# Patient Record
Sex: MALE | Race: WHITE | NOT HISPANIC OR LATINO | Employment: FULL TIME | URBAN - METROPOLITAN AREA
[De-identification: names, ages, dates, MRNs, and addresses within clinical notes are randomized per-mention and may not be internally consistent; named-entity substitution may affect disease eponyms.]

---

## 2017-01-06 ENCOUNTER — APPOINTMENT (OUTPATIENT)
Dept: ONCOLOGY | Facility: HOSPITAL | Age: 47
End: 2017-01-06

## 2017-01-10 ENCOUNTER — APPOINTMENT (OUTPATIENT)
Dept: LAB | Facility: HOSPITAL | Age: 47
End: 2017-01-10

## 2017-01-10 ENCOUNTER — LAB (OUTPATIENT)
Dept: LAB | Facility: HOSPITAL | Age: 47
End: 2017-01-10
Attending: INTERNAL MEDICINE

## 2017-01-10 ENCOUNTER — APPOINTMENT (OUTPATIENT)
Dept: ONCOLOGY | Facility: HOSPITAL | Age: 47
End: 2017-01-10

## 2017-01-10 DIAGNOSIS — E83.110 HEREDITARY HEMOCHROMATOSIS (HCC): ICD-10-CM

## 2017-01-10 LAB
BASOPHILS # BLD AUTO: 0.01 10*3/MM3 (ref 0–0.2)
BASOPHILS NFR BLD AUTO: 0.2 % (ref 0–1.5)
DEPRECATED RDW RBC AUTO: 42.6 FL (ref 37–54)
EOSINOPHIL # BLD AUTO: 0.11 10*3/MM3 (ref 0–0.7)
EOSINOPHIL NFR BLD AUTO: 2.1 % (ref 0.3–6.2)
ERYTHROCYTE [DISTWIDTH] IN BLOOD BY AUTOMATED COUNT: 13.5 % (ref 11.5–14.5)
FERRITIN SERPL-MCNC: 104.3 NG/ML (ref 30–400)
HCT VFR BLD AUTO: 42 % (ref 40.4–52.2)
HGB BLD-MCNC: 14.8 G/DL (ref 13.7–17.6)
IMM GRANULOCYTES # BLD: 0.01 10*3/MM3 (ref 0–0.03)
IMM GRANULOCYTES NFR BLD: 0.2 % (ref 0–0.5)
LYMPHOCYTES # BLD AUTO: 1.65 10*3/MM3 (ref 0.9–4.8)
LYMPHOCYTES NFR BLD AUTO: 31.6 % (ref 19.6–45.3)
MCH RBC QN AUTO: 30.5 PG (ref 27–32.7)
MCHC RBC AUTO-ENTMCNC: 35.2 G/DL (ref 32.6–36.4)
MCV RBC AUTO: 86.6 FL (ref 79.8–96.2)
MONOCYTES # BLD AUTO: 0.34 10*3/MM3 (ref 0.2–1.2)
MONOCYTES NFR BLD AUTO: 6.5 % (ref 5–12)
NEUTROPHILS # BLD AUTO: 3.1 10*3/MM3 (ref 1.9–8.1)
NEUTROPHILS NFR BLD AUTO: 59.4 % (ref 42.7–76)
NRBC BLD MANUAL-RTO: 0 /100 WBC (ref 0–0)
PLATELET # BLD AUTO: 162 10*3/MM3 (ref 140–500)
PMV BLD AUTO: 9.1 FL (ref 6–12)
RBC # BLD AUTO: 4.85 10*6/MM3 (ref 4.6–6)
WBC NRBC COR # BLD: 5.22 10*3/MM3 (ref 4.5–10.7)

## 2017-01-10 PROCEDURE — 36415 COLL VENOUS BLD VENIPUNCTURE: CPT

## 2017-01-10 PROCEDURE — 82728 ASSAY OF FERRITIN: CPT | Performed by: INTERNAL MEDICINE

## 2017-01-10 PROCEDURE — 85025 COMPLETE CBC W/AUTO DIFF WBC: CPT

## 2017-01-11 ENCOUNTER — TELEPHONE (OUTPATIENT)
Dept: ONCOLOGY | Facility: HOSPITAL | Age: 47
End: 2017-01-11

## 2017-01-11 NOTE — TELEPHONE ENCOUNTER
----- Message from Olga Peacock sent at 1/11/2017 11:56 AM EST -----   Lab results      [phone]    Pt calling for ferritin results. Ferritin 104. Pt aware. Message sent to scheduling to call pt to schedule phlebo. Pt v/u

## 2017-01-12 ENCOUNTER — INFUSION (OUTPATIENT)
Dept: ONCOLOGY | Facility: HOSPITAL | Age: 47
End: 2017-01-12

## 2017-01-12 VITALS
HEART RATE: 93 BPM | DIASTOLIC BLOOD PRESSURE: 92 MMHG | BODY MASS INDEX: 32.69 KG/M2 | SYSTOLIC BLOOD PRESSURE: 154 MMHG | TEMPERATURE: 97.2 F | WEIGHT: 234.4 LBS

## 2017-01-12 DIAGNOSIS — E83.110 HEREDITARY HEMOCHROMATOSIS (HCC): Primary | ICD-10-CM

## 2017-01-12 PROCEDURE — 99195 PHLEBOTOMY: CPT

## 2017-03-03 ENCOUNTER — APPOINTMENT (OUTPATIENT)
Dept: ONCOLOGY | Facility: HOSPITAL | Age: 47
End: 2017-03-03

## 2017-03-07 ENCOUNTER — LAB (OUTPATIENT)
Dept: ONCOLOGY | Facility: HOSPITAL | Age: 47
End: 2017-03-07

## 2017-03-07 ENCOUNTER — INFUSION (OUTPATIENT)
Dept: ONCOLOGY | Facility: HOSPITAL | Age: 47
End: 2017-03-07

## 2017-03-07 VITALS
WEIGHT: 220 LBS | SYSTOLIC BLOOD PRESSURE: 128 MMHG | HEART RATE: 97 BPM | BODY MASS INDEX: 30.68 KG/M2 | DIASTOLIC BLOOD PRESSURE: 87 MMHG | TEMPERATURE: 97.6 F

## 2017-03-07 DIAGNOSIS — E83.110 HEREDITARY HEMOCHROMATOSIS (HCC): ICD-10-CM

## 2017-03-07 DIAGNOSIS — E83.110 HEREDITARY HEMOCHROMATOSIS (HCC): Primary | ICD-10-CM

## 2017-03-07 LAB
BASOPHILS # BLD AUTO: 0.02 10*3/MM3 (ref 0–0.2)
BASOPHILS NFR BLD AUTO: 0.4 % (ref 0–1.5)
DEPRECATED RDW RBC AUTO: 43 FL (ref 37–54)
EOSINOPHIL # BLD AUTO: 0.09 10*3/MM3 (ref 0–0.7)
EOSINOPHIL NFR BLD AUTO: 1.6 % (ref 0.3–6.2)
ERYTHROCYTE [DISTWIDTH] IN BLOOD BY AUTOMATED COUNT: 13.8 % (ref 11.5–14.5)
FERRITIN SERPL-MCNC: 84.8 NG/ML (ref 30–400)
HCT VFR BLD AUTO: 43.4 % (ref 40.4–52.2)
HGB BLD-MCNC: 15.3 G/DL (ref 13.7–17.6)
IMM GRANULOCYTES # BLD: 0.01 10*3/MM3 (ref 0–0.03)
IMM GRANULOCYTES NFR BLD: 0.2 % (ref 0–0.5)
LYMPHOCYTES # BLD AUTO: 1.67 10*3/MM3 (ref 0.9–4.8)
LYMPHOCYTES NFR BLD AUTO: 29.3 % (ref 19.6–45.3)
MCH RBC QN AUTO: 30.2 PG (ref 27–32.7)
MCHC RBC AUTO-ENTMCNC: 35.3 G/DL (ref 32.6–36.4)
MCV RBC AUTO: 85.6 FL (ref 79.8–96.2)
MONOCYTES # BLD AUTO: 0.38 10*3/MM3 (ref 0.2–1.2)
MONOCYTES NFR BLD AUTO: 6.7 % (ref 5–12)
NEUTROPHILS # BLD AUTO: 3.52 10*3/MM3 (ref 1.9–8.1)
NEUTROPHILS NFR BLD AUTO: 61.8 % (ref 42.7–76)
NRBC BLD MANUAL-RTO: 0 /100 WBC (ref 0–0)
PLATELET # BLD AUTO: 166 10*3/MM3 (ref 140–500)
PMV BLD AUTO: 9.6 FL (ref 6–12)
RBC # BLD AUTO: 5.07 10*6/MM3 (ref 4.6–6)
WBC NRBC COR # BLD: 5.69 10*3/MM3 (ref 4.5–10.7)

## 2017-03-07 PROCEDURE — 85025 COMPLETE CBC W/AUTO DIFF WBC: CPT | Performed by: INTERNAL MEDICINE

## 2017-03-07 PROCEDURE — 36415 COLL VENOUS BLD VENIPUNCTURE: CPT

## 2017-03-07 PROCEDURE — 82728 ASSAY OF FERRITIN: CPT | Performed by: INTERNAL MEDICINE

## 2017-03-07 PROCEDURE — 99195 PHLEBOTOMY: CPT | Performed by: INTERNAL MEDICINE

## 2017-04-27 ENCOUNTER — INFUSION (OUTPATIENT)
Dept: ONCOLOGY | Facility: HOSPITAL | Age: 47
End: 2017-04-27

## 2017-04-27 ENCOUNTER — LAB (OUTPATIENT)
Dept: OTHER | Facility: HOSPITAL | Age: 47
End: 2017-04-27

## 2017-04-27 VITALS
TEMPERATURE: 98.7 F | HEART RATE: 80 BPM | BODY MASS INDEX: 33.03 KG/M2 | WEIGHT: 236.8 LBS | SYSTOLIC BLOOD PRESSURE: 148 MMHG | DIASTOLIC BLOOD PRESSURE: 93 MMHG

## 2017-04-27 DIAGNOSIS — E83.110 HEREDITARY HEMOCHROMATOSIS (HCC): Primary | ICD-10-CM

## 2017-04-27 DIAGNOSIS — E83.110 HEREDITARY HEMOCHROMATOSIS (HCC): ICD-10-CM

## 2017-04-27 LAB
BASOPHILS # BLD AUTO: 0.02 10*3/MM3 (ref 0–0.2)
BASOPHILS NFR BLD AUTO: 0.4 % (ref 0–1.5)
DEPRECATED RDW RBC AUTO: 45 FL (ref 37–54)
EOSINOPHIL # BLD AUTO: 0.07 10*3/MM3 (ref 0–0.7)
EOSINOPHIL NFR BLD AUTO: 1.4 % (ref 0.3–6.2)
ERYTHROCYTE [DISTWIDTH] IN BLOOD BY AUTOMATED COUNT: 14.1 % (ref 11.5–14.5)
FERRITIN SERPL-MCNC: 52.7 NG/ML (ref 30–400)
HCT VFR BLD AUTO: 42.9 % (ref 40.4–52.2)
HGB BLD-MCNC: 14.8 G/DL (ref 13.7–17.6)
IMM GRANULOCYTES # BLD: 0.01 10*3/MM3 (ref 0–0.03)
IMM GRANULOCYTES NFR BLD: 0.2 % (ref 0–0.5)
LYMPHOCYTES # BLD AUTO: 1.82 10*3/MM3 (ref 0.9–4.8)
LYMPHOCYTES NFR BLD AUTO: 36 % (ref 19.6–45.3)
MCH RBC QN AUTO: 30.4 PG (ref 27–32.7)
MCHC RBC AUTO-ENTMCNC: 34.5 G/DL (ref 32.6–36.4)
MCV RBC AUTO: 88.1 FL (ref 79.8–96.2)
MONOCYTES # BLD AUTO: 0.37 10*3/MM3 (ref 0.2–1.2)
MONOCYTES NFR BLD AUTO: 7.3 % (ref 5–12)
NEUTROPHILS # BLD AUTO: 2.76 10*3/MM3 (ref 1.9–8.1)
NEUTROPHILS NFR BLD AUTO: 54.7 % (ref 42.7–76)
NRBC BLD MANUAL-RTO: 0 /100 WBC (ref 0–0)
PLATELET # BLD AUTO: 186 10*3/MM3 (ref 140–500)
PMV BLD AUTO: 9.8 FL (ref 6–12)
RBC # BLD AUTO: 4.87 10*6/MM3 (ref 4.6–6)
WBC NRBC COR # BLD: 5.05 10*3/MM3 (ref 4.5–10.7)

## 2017-04-27 PROCEDURE — 85025 COMPLETE CBC W/AUTO DIFF WBC: CPT | Performed by: INTERNAL MEDICINE

## 2017-04-27 PROCEDURE — 36415 COLL VENOUS BLD VENIPUNCTURE: CPT

## 2017-04-27 PROCEDURE — 82728 ASSAY OF FERRITIN: CPT | Performed by: INTERNAL MEDICINE

## 2017-04-27 PROCEDURE — 99195 PHLEBOTOMY: CPT | Performed by: NURSE PRACTITIONER

## 2017-04-28 ENCOUNTER — APPOINTMENT (OUTPATIENT)
Dept: ONCOLOGY | Facility: HOSPITAL | Age: 47
End: 2017-04-28

## 2017-05-31 DIAGNOSIS — E83.110 HEREDITARY HEMOCHROMATOSIS (HCC): ICD-10-CM

## 2017-05-31 DIAGNOSIS — E55.9 AVITAMINOSIS D: ICD-10-CM

## 2017-05-31 DIAGNOSIS — Z00.00 HEALTHCARE MAINTENANCE: Primary | ICD-10-CM

## 2017-05-31 DIAGNOSIS — R03.0 BORDERLINE BLOOD PRESSURE: ICD-10-CM

## 2017-06-03 LAB
25(OH)D3+25(OH)D2 SERPL-MCNC: 18.5 NG/ML (ref 30–100)
ALBUMIN SERPL-MCNC: 5 G/DL (ref 3.5–5.2)
ALBUMIN/GLOB SERPL: 1.9 G/DL
ALP SERPL-CCNC: 71 U/L (ref 39–117)
ALT SERPL-CCNC: 42 U/L (ref 1–41)
APPEARANCE UR: CLEAR
AST SERPL-CCNC: 31 U/L (ref 1–40)
BACTERIA #/AREA URNS HPF: NORMAL /HPF
BASOPHILS # BLD AUTO: 0.01 10*3/MM3 (ref 0–0.2)
BASOPHILS NFR BLD AUTO: 0.2 % (ref 0–1.5)
BILIRUB SERPL-MCNC: 0.9 MG/DL (ref 0.1–1.2)
BILIRUB UR QL STRIP: NEGATIVE
BUN SERPL-MCNC: 12 MG/DL (ref 6–20)
BUN/CREAT SERPL: 12.4 (ref 7–25)
CALCIUM SERPL-MCNC: 9.6 MG/DL (ref 8.6–10.5)
CHLORIDE SERPL-SCNC: 98 MMOL/L (ref 98–107)
CHOLEST SERPL-MCNC: 159 MG/DL (ref 0–200)
CO2 SERPL-SCNC: 26.5 MMOL/L (ref 22–29)
COLOR UR: YELLOW
CREAT SERPL-MCNC: 0.97 MG/DL (ref 0.76–1.27)
EOSINOPHIL # BLD AUTO: 0.13 10*3/MM3 (ref 0–0.7)
EOSINOPHIL NFR BLD AUTO: 2.2 % (ref 0.3–6.2)
EPI CELLS #/AREA URNS HPF: NORMAL /HPF
ERYTHROCYTE [DISTWIDTH] IN BLOOD BY AUTOMATED COUNT: 14.6 % (ref 11.5–14.5)
GLOBULIN SER CALC-MCNC: 2.7 GM/DL
GLUCOSE SERPL-MCNC: 115 MG/DL (ref 65–99)
GLUCOSE UR QL: NEGATIVE
HCT VFR BLD AUTO: 46.2 % (ref 40.4–52.2)
HDLC SERPL-MCNC: 39 MG/DL (ref 40–60)
HGB BLD-MCNC: 15.4 G/DL (ref 13.7–17.6)
HGB UR QL STRIP: NEGATIVE
IMM GRANULOCYTES # BLD: 0 10*3/MM3 (ref 0–0.03)
IMM GRANULOCYTES NFR BLD: 0 % (ref 0–0.5)
KETONES UR QL STRIP: NEGATIVE
LDLC SERPL CALC-MCNC: 88 MG/DL (ref 0–100)
LEUKOCYTE ESTERASE UR QL STRIP: NEGATIVE
LYMPHOCYTES # BLD AUTO: 1.76 10*3/MM3 (ref 0.9–4.8)
LYMPHOCYTES NFR BLD AUTO: 29.9 % (ref 19.6–45.3)
MCH RBC QN AUTO: 30.4 PG (ref 27–32.7)
MCHC RBC AUTO-ENTMCNC: 33.3 G/DL (ref 32.6–36.4)
MCV RBC AUTO: 91.1 FL (ref 79.8–96.2)
MICRO URNS: NORMAL
MICRO URNS: NORMAL
MONOCYTES # BLD AUTO: 0.46 10*3/MM3 (ref 0.2–1.2)
MONOCYTES NFR BLD AUTO: 7.8 % (ref 5–12)
MUCOUS THREADS URNS QL MICRO: PRESENT /HPF
NEUTROPHILS # BLD AUTO: 3.53 10*3/MM3 (ref 1.9–8.1)
NEUTROPHILS NFR BLD AUTO: 59.9 % (ref 42.7–76)
NITRITE UR QL STRIP: NEGATIVE
PH UR STRIP: 6.5 [PH] (ref 5–7.5)
PLATELET # BLD AUTO: 182 10*3/MM3 (ref 140–500)
POTASSIUM SERPL-SCNC: 4.1 MMOL/L (ref 3.5–5.2)
PROT SERPL-MCNC: 7.7 G/DL (ref 6–8.5)
PROT UR QL STRIP: NEGATIVE
RBC # BLD AUTO: 5.07 10*6/MM3 (ref 4.6–6)
RBC #/AREA URNS HPF: NORMAL /HPF
SODIUM SERPL-SCNC: 138 MMOL/L (ref 136–145)
SP GR UR: 1.02 (ref 1–1.03)
TRIGL SERPL-MCNC: 159 MG/DL (ref 0–150)
TSH SERPL DL<=0.005 MIU/L-ACNC: 1.32 MIU/ML (ref 0.27–4.2)
URINALYSIS REFLEX: NORMAL
UROBILINOGEN UR STRIP-MCNC: 0.2 MG/DL (ref 0.2–1)
VLDLC SERPL CALC-MCNC: 31.8 MG/DL (ref 5–40)
WBC # BLD AUTO: 5.89 10*3/MM3 (ref 4.5–10.7)
WBC #/AREA URNS HPF: NORMAL /HPF

## 2017-06-09 ENCOUNTER — OFFICE VISIT (OUTPATIENT)
Dept: INTERNAL MEDICINE | Facility: CLINIC | Age: 47
End: 2017-06-09

## 2017-06-09 VITALS
WEIGHT: 233 LBS | TEMPERATURE: 98.6 F | RESPIRATION RATE: 12 BRPM | BODY MASS INDEX: 32.62 KG/M2 | HEIGHT: 71 IN | OXYGEN SATURATION: 98 % | HEART RATE: 80 BPM | SYSTOLIC BLOOD PRESSURE: 128 MMHG | DIASTOLIC BLOOD PRESSURE: 90 MMHG

## 2017-06-09 DIAGNOSIS — C44.92 SQUAMOUS CELL CARCINOMA OF SKIN: ICD-10-CM

## 2017-06-09 DIAGNOSIS — R73.01 IFG (IMPAIRED FASTING GLUCOSE): ICD-10-CM

## 2017-06-09 DIAGNOSIS — R10.30 LOWER ABDOMINAL PAIN: ICD-10-CM

## 2017-06-09 DIAGNOSIS — E78.5 DYSLIPIDEMIA: ICD-10-CM

## 2017-06-09 DIAGNOSIS — E55.9 AVITAMINOSIS D: ICD-10-CM

## 2017-06-09 DIAGNOSIS — E66.9 OBESITY (BMI 30-39.9): ICD-10-CM

## 2017-06-09 DIAGNOSIS — E83.110 HEREDITARY HEMOCHROMATOSIS (HCC): ICD-10-CM

## 2017-06-09 DIAGNOSIS — Z00.00 HEALTHCARE MAINTENANCE: Primary | ICD-10-CM

## 2017-06-09 DIAGNOSIS — L71.9 ROSACEA: ICD-10-CM

## 2017-06-09 DIAGNOSIS — R11.2 NON-INTRACTABLE VOMITING WITH NAUSEA, UNSPECIFIED VOMITING TYPE: ICD-10-CM

## 2017-06-09 DIAGNOSIS — R03.0 BORDERLINE BLOOD PRESSURE: ICD-10-CM

## 2017-06-09 LAB
FERRITIN SERPL-MCNC: 49.94 NG/ML (ref 30–400)
HBA1C MFR BLD: 4.72 % (ref 4.8–5.6)

## 2017-06-09 PROCEDURE — 99396 PREV VISIT EST AGE 40-64: CPT | Performed by: INTERNAL MEDICINE

## 2017-06-09 PROCEDURE — 90471 IMMUNIZATION ADMIN: CPT | Performed by: INTERNAL MEDICINE

## 2017-06-09 PROCEDURE — 99213 OFFICE O/P EST LOW 20 MIN: CPT | Performed by: INTERNAL MEDICINE

## 2017-06-09 PROCEDURE — 90715 TDAP VACCINE 7 YRS/> IM: CPT | Performed by: INTERNAL MEDICINE

## 2017-06-09 RX ORDER — OMEPRAZOLE 20 MG/1
20 CAPSULE, DELAYED RELEASE ORAL DAILY
Qty: 30 CAPSULE | Refills: 2 | Status: SHIPPED | OUTPATIENT
Start: 2017-06-09 | End: 2017-11-17

## 2017-06-09 RX ORDER — MELATONIN
1000 DAILY
Qty: 30 TABLET | Refills: 5
Start: 2017-06-09 | End: 2018-10-23

## 2017-06-09 NOTE — PROGRESS NOTES
"Annual Exam (review of medical issues )      HPI  Matias Hughes is a 46 y.o. male RTC In yearly CPE, review of medical issues:  1. Squamous Cell CA of skin - s/p excision with derm, sees derm q 6 months. No new cancers over last 3 years.   2. Hemachromatosis - phlebotomy ~ 4x/ year with goal ferritin < 50.  Pt is getting checked q 2 months now.  [facility] declined to allow blood donation with diagnosis. Was not told had any other diagnosis other than hemachromatosis.    I called and spoke with Dr. Noonan today and he is OK with pt checking ferritin here q 3 months and donating blood at [facility] for phlebotomy when needed. Pt desires this plan to minimize visits to Heme office. Will see Dr. Noonan q year and asked pt to make appt with Dr. Noonan in 6 months to stagger our CPE appts. Pt agreeable to plan. 3. Pre-HTN - persists today and pt notes q 3 months checks at hematology have been OK.   4. Hx of chronic prostatitis - \"bugs me off and on\" with feeling of achiness, but \"it goes away\".  No recent meds.   sx have largely resolved, no longer seeing urology.   5. Rosacea - off doxycycline after failure with topicals. Pt has stopped all meds at this point except for PRN pill based med regimen (thinks is minocycline) from derm.   6. Vitamin D deficiency -off 1000 I.U. Vitamin D3 OTC daily, but taking MVI daily.   7. Dyslipidemia, low HDL - joined gym last Fall and was regular, and has been there less often recently. Does cardio when goes. Does some walking at times with dogs at home.  Diet is unchanged, \"nothing spectacular\".   We discussed CR factor of low HDL and need to increase CV exercise. Trend labs. Weight loss advised as pt is overweight/ obese with BMI 32.5       Review of Systems   Constitution: Positive for weight gain. Negative for chills, decreased appetite, fever and malaise/fatigue.   HENT: Negative for congestion, headaches, hearing loss, odynophagia and sore throat.    Eyes: Negative for discharge, double " "vision, pain and redness.        Last eye exam 2016; has contacts Rx, not using     Cardiovascular: Negative for chest pain, dyspnea on exertion, irregular heartbeat, leg swelling, near-syncope, palpitations and syncope.   Respiratory: Negative for cough and shortness of breath.    Hematologic/Lymphatic: Negative for bleeding problem. Does not bruise/bleed easily.   Skin: Positive for skin cancer (hx of). Negative for rash and suspicious lesions.   Musculoskeletal: Positive for arthritis (in hands, noted on X-ray last year. ) and joint pain (mild in hands, thought due to OA, no limiting. No meds used. ). Negative for joint swelling, muscle cramps and muscle weakness.   Gastrointestinal: Positive for abdominal pain (denied initially, but after pain in lower quads on exam admits has noticed months of \"discomfort\" in lower abdomen on both sides. ), heartburn (intermittent, 2x/ month. ), nausea (notable per pt, intermittent, did have GI \"bug at times\".  Occasionally has a pre-vomiting sensation that will last ~5 minutes.  NO pain associated. ) and vomiting (x 1, after volleyball game.  Recalls sx with SBO in past was vomiting and abdominal pain, but pt notes these feel different from SBO events. ). Negative for bloating, constipation, diarrhea, dysphagia and hematemesis.   Genitourinary: Negative for bladder incontinence, dysuria, frequency, hematuria and nocturia.   Neurological: Negative for dizziness and light-headedness.   Psychiatric/Behavioral: Negative for depression. The patient does not have insomnia and is not nervous/anxious.    Allergic/Immunologic: Negative for environmental allergies.       Problem List:    Patient Active Problem List   Diagnosis   • Hemochromatosis   • Borderline blood pressure   • Squamous cell carcinoma of skin   • Avitaminosis D   • Rosacea   • Healthcare maintenance   • Dyslipidemia   • IFG (impaired fasting glucose)   • Non-intractable vomiting with nausea   • Lower abdominal pain " "  • Obesity (BMI 30-39.9)       Medical History:    Past Medical History:   Diagnosis Date   • H/O hypogonadism    • Hemochromatosis     heterozygous for C282Y mutation hx elevated LFT's Ferritin elevated at dx   • History of cholelithiasis    • History of chronic prostatitis     2010 tx'd by Dr Cooper   • History of Clostridium difficile colitis     Hospitalized June 2013; with SBO   • History of small bowel obstruction     related to adhesions from gallbladder surgery adhesions; 2013; 2015; 2016   • Overweight    • Prehypertension    • Rosacea 6/2/2016   • Squamous cell carcinoma of skin     2012 sees derm q 6 months (Dr. Mitchell)   • Vitamin D deficiency         Social History:    Social History     Social History   • Marital status:      Spouse name: Shante   • Number of children: 2   • Years of education: College     Occupational History   • CPA [employer]     Social History Main Topics   • Smoking status: Never Smoker   • Smokeless tobacco: Not on file   • Alcohol use Yes      Comment: 3-5 drinks week   • Drug use: No   • Sexual activity: Yes     Partners: Female      Comment: wife only; no hx STD's     Other Topics Concern   • Not on file     Social History Narrative    Diet -  \"Could be better\"; too many sweets and red meat    Exercise - volleyball 2x/ week, using Fit[employer], goes to gym intermittently    Caffeine - 3-4 cups coffee daily       Family History:   Family History   Problem Relation Age of Onset   • Hypothyroidism Mother    • Rheum arthritis Maternal Aunt    • Breast cancer Maternal Aunt    • Glaucoma Paternal Grandmother    • Breast cancer Maternal Grandmother    • Migraines Daughter    • Seizures Daughter      Epilepsy   • Migraines Son        Surgical History:   Past Surgical History:   Procedure Laterality Date   • GALLBLADDER SURGERY      2013 laparoscopically    • LAPAROSCOPIC LYSIS OF ADHESIONS      peritoneal 2014 after SBO   • SINUS SURGERY      2007 for persistent congestion ?? " turbinate reduction   • TONSILLECTOMY      1978         Current Outpatient Prescriptions:   •  Multiple Vitamins-Minerals (MULTI COMPLETE PO), Take  by mouth., Disp: , Rfl:   •  cholecalciferol (VITAMIN D3) 1000 UNITS tablet, Take 1 tablet by mouth Daily., Disp: 30 tablet, Rfl: 5  •  omeprazole (PRILOSEC) 20 MG capsule, Take 1 capsule by mouth Daily., Disp: 30 capsule, Rfl: 2    Vitals:    06/09/17 0859   BP: 128/90   Pulse: 80   Resp: 12   Temp: 98.6 °F (37 °C)   SpO2: 98%       Physical Exam   Constitutional: He is oriented to person, place, and time. He appears well-developed and well-nourished. He is cooperative.   HENT:   Head: Normocephalic and atraumatic.   Right Ear: Hearing, tympanic membrane, external ear and ear canal normal.   Left Ear: Hearing, tympanic membrane, external ear and ear canal normal.   Nose: Nose normal.   Mouth/Throat: Uvula is midline, oropharynx is clear and moist and mucous membranes are normal.   Eyes: Conjunctivae, EOM and lids are normal. Pupils are equal, round, and reactive to light.   Neck: Normal range of motion and full passive range of motion without pain. Neck supple. Carotid bruit is not present. No thyroid mass and no thyromegaly present.   Cardiovascular: Normal rate, regular rhythm, S1 normal, S2 normal, normal heart sounds and intact distal pulses.  Exam reveals no gallop and no friction rub.    No murmur heard.  Pulses:       Radial pulses are 2+ on the right side, and 2+ on the left side.        Dorsalis pedis pulses are 2+ on the right side, and 2+ on the left side.        Posterior tibial pulses are 2+ on the right side, and 2+ on the left side.   Pulmonary/Chest: Effort normal and breath sounds normal. No respiratory distress. He has no wheezes. He has no rhonchi. He has no rales.   Abdominal: Soft. Bowel sounds are normal. He exhibits no distension. There is no hepatosplenomegaly. There is tenderness (more tender in RLQ  than LLQ; no rebound) in the right lower  "quadrant, suprapubic area and left lower quadrant. There is no rebound and no guarding.   Genitourinary: Rectal exam shows external hemorrhoid (non-bleeding, non-thrombosed). Rectal exam shows no internal hemorrhoid, no tenderness and anal tone normal. Prostate is tender (mild, no \"chandelier sign\".). Prostate is not enlarged.   Musculoskeletal: Normal range of motion. He exhibits no edema.       Vascular Status -  His exam exhibits right foot vasculature abnormal and no right foot edema. His exam exhibits left foot vasculature abnormal and no left foot edema.  Lymphadenopathy:     He has no cervical adenopathy.     He has no axillary adenopathy.        Right: No inguinal adenopathy present.        Left: No inguinal adenopathy present.   Neurological: He is alert and oriented to person, place, and time. He has normal strength and normal reflexes. He displays no tremor. No cranial nerve deficit or sensory deficit. He exhibits normal muscle tone. Gait normal.   Skin: Skin is warm, dry and intact. No rash noted.        Psychiatric: He has a normal mood and affect. His speech is normal and behavior is normal. Thought content normal. Cognition and memory are normal.   Vitals reviewed.      Assessment/ Plan  Diagnoses and all orders for this visit:    Healthcare maintenance  -     Tdap Vaccine Greater Than or Equal To 6yo IM    Rosacea    Hereditary hemochromatosis  -     Ferritin    Dyslipidemia    Squamous cell carcinoma of skin    Borderline blood pressure    Avitaminosis D  -     cholecalciferol (VITAMIN D3) 1000 UNITS tablet; Take 1 tablet by mouth Daily.    IFG (impaired fasting glucose)  -     Hemoglobin A1c    Non-intractable vomiting with nausea, unspecified vomiting type  -     omeprazole (PRILOSEC) 20 MG capsule; Take 1 capsule by mouth Daily.  -     CT abdomen pelvis w contrast; Future    Lower abdominal pain  -     CT abdomen pelvis w contrast; Future    Obesity (BMI 30-39.9)    Other orders  -     Multiple " Vitamins-Minerals (MULTI COMPLETE PO); Take  by mouth.        Return in about 6 months (around 12/9/2017) for Recheck.      Discussion:  Matias Hughes is a 46 y.o. male RTC In yearly CPE, review of medical issues:  1. Squamous Cell CA of skin ~2014 - s/p excision with derm, sees derm q 6 months.   2. Hemachromatosis - phlebotomy ~ 4x/ year with goal ferritin < 50 per Dr. Noonan. Pt on regular f/u schedule with Heme after [facility] declined to accept donations.   3. Hx of chronic prostatitis -NAGI benign today, U/A clear. Monitor.    4. Rosacea - on minocycline PRN from derm. ROBERT.   6. Vitamin D deficiency - persists, restart 1000 I.U. Vitamin D3 OTC daily.   7. Dyslipidemia, low HDL - discussed CR factor of low HDL in past. Urged pt to start CV exercise more aggressively and TLC diet mods.   8. Lower abdominal pain on exam/ intermittent nausea/ hx SBO - new issue today.  Gathered info through visit and exam that shows a concerning picture, most notably very tender R > L LQ in abdomen.  Pt hx of nausea and unexplained vomiting randomly also concerning.  Ddx is broad and includes GERD/ gastritis/ PUD (if the case, vomiting is worrisome sign) vs. Intermittent SBO vs. Recurrent C.diff (less likely) vs. Chronic prostatitis flares vs. Bowel mass vs. ??   Will send pt for CT A/P and have him start on daily low dose PPI. May need GI eval for endoscopies, but will address after CT scan.     9. IFG/ Obesity - new issue on labs today with noted weight gain/ obesity. Check A1C.  Pre-DM handout provided and weight loss advised.  Will need additional TLC diet education, particularly with #7.   10. HM - labs d/w pt; Flu - next season; Tdap today; C-scope UTD 2011, repeat at age 48; NAGI OK, discuss PSA at age 48; add additional exercise    RTC 6 months, F labs prior. Will f/u with pt after imaging and move appt up as indicated.

## 2017-06-14 ENCOUNTER — TELEPHONE (OUTPATIENT)
Dept: ONCOLOGY | Facility: HOSPITAL | Age: 47
End: 2017-06-14

## 2017-06-14 ENCOUNTER — TELEPHONE (OUTPATIENT)
Dept: ONCOLOGY | Facility: CLINIC | Age: 47
End: 2017-06-14

## 2017-06-14 NOTE — TELEPHONE ENCOUNTER
Pt calling and states that he just had labs drawn at [facility] the other day. Results in computer. Ferritin 49.9. Pt is supposed to know have labs with possible phlebo next week. He is wanting to know if he can change the interval of his appts. Attempted to call pt. No answer. L/M to call back.     Spoke with pt and he is wanting to move out next weeks schedule for a month and then wanting to know if he can go back to an every 3 month schedule of lab with possible phlebo. Message sent to Dr. Noonan. Per Dr. Saran fuller for all the above. Message to scheduling desk. Called pt and left him message informing him of this.

## 2017-06-14 NOTE — TELEPHONE ENCOUNTER
----- Message from Trice Hayes RN sent at 6/14/2017  3:14 PM EDT -----  Please move pt appt next week out 1 month and then instead of every 2 month labs and phlebo make every 3 months. Thanks!

## 2017-06-20 ENCOUNTER — HOSPITAL ENCOUNTER (OUTPATIENT)
Dept: CT IMAGING | Facility: HOSPITAL | Age: 47
Discharge: HOME | End: 2017-06-20
Admitting: INTERNAL MEDICINE

## 2017-06-20 DIAGNOSIS — R10.30 LOWER ABDOMINAL PAIN: ICD-10-CM

## 2017-06-20 DIAGNOSIS — R11.2 NON-INTRACTABLE VOMITING WITH NAUSEA, UNSPECIFIED VOMITING TYPE: ICD-10-CM

## 2017-06-20 PROCEDURE — 0 DIATRIZOATE MEGLUMINE & SODIUM PER 1 ML: Performed by: INTERNAL MEDICINE

## 2017-06-20 PROCEDURE — 82565 ASSAY OF CREATININE: CPT

## 2017-06-20 PROCEDURE — 0 IOPAMIDOL 61 % SOLUTION: Performed by: INTERNAL MEDICINE

## 2017-06-20 PROCEDURE — 74177 CT ABD & PELVIS W/CONTRAST: CPT

## 2017-06-20 RX ADMIN — DIATRIZOATE MEGLUMINE AND DIATRIZOATE SODIUM 30 ML: 660; 100 LIQUID ORAL; RECTAL at 08:32

## 2017-06-20 RX ADMIN — IOPAMIDOL 85 ML: 612 INJECTION, SOLUTION INTRAVENOUS at 09:19

## 2017-06-21 LAB — CREAT BLDA-MCNC: 0.9 MG/DL (ref 0.6–1.3)

## 2017-06-22 ENCOUNTER — APPOINTMENT (OUTPATIENT)
Dept: OTHER | Facility: HOSPITAL | Age: 47
End: 2017-06-22

## 2017-06-22 ENCOUNTER — APPOINTMENT (OUTPATIENT)
Dept: ONCOLOGY | Facility: HOSPITAL | Age: 47
End: 2017-06-22

## 2017-06-22 PROBLEM — K43.9 VENTRAL HERNIA WITHOUT OBSTRUCTION OR GANGRENE: Status: ACTIVE | Noted: 2017-06-22

## 2017-06-22 PROBLEM — K76.0 FATTY LIVER: Status: ACTIVE | Noted: 2017-06-22

## 2017-07-17 RX ORDER — SODIUM CHLORIDE 9 MG/ML
250 INJECTION, SOLUTION INTRAVENOUS ONCE
Status: CANCELLED | OUTPATIENT
Start: 2017-07-20

## 2017-07-20 ENCOUNTER — LAB (OUTPATIENT)
Dept: OTHER | Facility: HOSPITAL | Age: 47
End: 2017-07-20

## 2017-07-20 ENCOUNTER — INFUSION (OUTPATIENT)
Dept: ONCOLOGY | Facility: HOSPITAL | Age: 47
End: 2017-07-20

## 2017-07-20 VITALS — HEART RATE: 79 BPM | TEMPERATURE: 98 F | BODY MASS INDEX: 32.08 KG/M2 | WEIGHT: 230 LBS

## 2017-07-20 DIAGNOSIS — E83.110 HEREDITARY HEMOCHROMATOSIS (HCC): ICD-10-CM

## 2017-07-20 LAB
BASOPHILS # BLD AUTO: 0.01 10*3/MM3 (ref 0–0.2)
BASOPHILS NFR BLD AUTO: 0.2 % (ref 0–1.5)
DEPRECATED RDW RBC AUTO: 45.1 FL (ref 37–54)
EOSINOPHIL # BLD AUTO: 0.09 10*3/MM3 (ref 0–0.7)
EOSINOPHIL NFR BLD AUTO: 1.6 % (ref 0.3–6.2)
ERYTHROCYTE [DISTWIDTH] IN BLOOD BY AUTOMATED COUNT: 14.2 % (ref 11.5–14.5)
FERRITIN SERPL-MCNC: 49.5 NG/ML (ref 30–400)
HCT VFR BLD AUTO: 40.4 % (ref 40.4–52.2)
HGB BLD-MCNC: 14 G/DL (ref 13.7–17.6)
IMM GRANULOCYTES # BLD: 0.01 10*3/MM3 (ref 0–0.03)
IMM GRANULOCYTES NFR BLD: 0.2 % (ref 0–0.5)
LYMPHOCYTES # BLD AUTO: 1.62 10*3/MM3 (ref 0.9–4.8)
LYMPHOCYTES NFR BLD AUTO: 29.3 % (ref 19.6–45.3)
MCH RBC QN AUTO: 30.2 PG (ref 27–32.7)
MCHC RBC AUTO-ENTMCNC: 34.7 G/DL (ref 32.6–36.4)
MCV RBC AUTO: 87.1 FL (ref 79.8–96.2)
MONOCYTES # BLD AUTO: 0.36 10*3/MM3 (ref 0.2–1.2)
MONOCYTES NFR BLD AUTO: 6.5 % (ref 5–12)
NEUTROPHILS # BLD AUTO: 3.43 10*3/MM3 (ref 1.9–8.1)
NEUTROPHILS NFR BLD AUTO: 62.2 % (ref 42.7–76)
NRBC BLD MANUAL-RTO: 0 /100 WBC (ref 0–0)
PLATELET # BLD AUTO: 159 10*3/MM3 (ref 140–500)
PMV BLD AUTO: 9.7 FL (ref 6–12)
RBC # BLD AUTO: 4.64 10*6/MM3 (ref 4.6–6)
WBC NRBC COR # BLD: 5.52 10*3/MM3 (ref 4.5–10.7)

## 2017-07-20 PROCEDURE — 82728 ASSAY OF FERRITIN: CPT | Performed by: INTERNAL MEDICINE

## 2017-07-20 PROCEDURE — 36415 COLL VENOUS BLD VENIPUNCTURE: CPT

## 2017-07-20 PROCEDURE — 85025 COMPLETE CBC W/AUTO DIFF WBC: CPT | Performed by: INTERNAL MEDICINE

## 2017-09-28 ENCOUNTER — OFFICE VISIT (OUTPATIENT)
Dept: INTERNAL MEDICINE | Facility: CLINIC | Age: 47
End: 2017-09-28

## 2017-09-28 VITALS
SYSTOLIC BLOOD PRESSURE: 148 MMHG | TEMPERATURE: 98.2 F | BODY MASS INDEX: 32.48 KG/M2 | HEART RATE: 106 BPM | OXYGEN SATURATION: 97 % | WEIGHT: 232 LBS | DIASTOLIC BLOOD PRESSURE: 88 MMHG | HEIGHT: 71 IN

## 2017-09-28 DIAGNOSIS — R53.81 MALAISE: ICD-10-CM

## 2017-09-28 DIAGNOSIS — E66.9 OBESITY (BMI 30-39.9): ICD-10-CM

## 2017-09-28 DIAGNOSIS — R10.30 LOWER ABDOMINAL PAIN: ICD-10-CM

## 2017-09-28 DIAGNOSIS — R19.5 CHANGE IN STOOL CALIBER: Primary | ICD-10-CM

## 2017-09-28 DIAGNOSIS — K43.9 VENTRAL HERNIA WITHOUT OBSTRUCTION OR GANGRENE: ICD-10-CM

## 2017-09-28 DIAGNOSIS — K59.00 CONSTIPATION, UNSPECIFIED CONSTIPATION TYPE: ICD-10-CM

## 2017-09-28 PROBLEM — R11.2 NON-INTRACTABLE VOMITING WITH NAUSEA: Status: RESOLVED | Noted: 2017-06-09 | Resolved: 2017-09-28

## 2017-09-28 PROCEDURE — 99214 OFFICE O/P EST MOD 30 MIN: CPT | Performed by: INTERNAL MEDICINE

## 2017-09-28 RX ORDER — POLYETHYLENE GLYCOL 3350 17 G/17G
17 POWDER, FOR SOLUTION ORAL DAILY
Qty: 12 EACH | Refills: 2
Start: 2017-09-28 | End: 2018-10-23

## 2017-09-28 NOTE — PROGRESS NOTES
"Abdominal Pain (lower abdomen, )      HPI  Matias Hughes is a 47 y.o. male RTC in acute care:  \"I have not felt great\".  Has general sense of malaise and \"dont feel great\". Sx really started around time of last CPE 6/20/17.  \"I feel a little constipated and just not right\".  Feels more a general achiness in abdomen on both sides below beltline.  Did start PPI after last visit and feels \"it has helped\" in that has not had any additional \"mouth watering like I am going to get sick. I dont have any more of those\".  Is not overtly constipated in that goes every day.  Caliper of stool is smaller, \"more difficult to go\".  \"I guess I feel kind of bloated\".  No meds used for constipation with this.    Had C-scope in 2011 and only thing on it was hemorrhoids after had seen some bleeding.   Did take some doxycycline recently for Rosacea, took for a few days recently.     Review of Systems   Constitution: Negative for chills, fever and weight loss.   Cardiovascular: Negative for chest pain and dyspnea on exertion.   Respiratory: Negative for cough and shortness of breath.    Gastrointestinal: Positive for bloating, abdominal pain, change in bowel habit and constipation (stools are still brown). Negative for bowel incontinence, diarrhea, heartburn, melena, nausea (resolved) and vomiting.   Genitourinary: Negative for dysuria, frequency and hematuria.       The following portions of the patient's history were reviewed and updated as appropriate: allergies, current medications, past medical history and problem list.      Current Outpatient Prescriptions:   •  cholecalciferol (VITAMIN D3) 1000 UNITS tablet, Take 1 tablet by mouth Daily., Disp: 30 tablet, Rfl: 5  •  omeprazole (PRILOSEC) 20 MG capsule, Take 1 capsule by mouth Daily., Disp: 30 capsule, Rfl: 2  •  polyethylene glycol (MIRALAX) packet, Take 17 g by mouth Daily., Disp: 12 each, Rfl: 2    Vitals:    09/28/17 1432   BP: 148/88   Pulse: 106   Temp: 98.2 °F (36.8 °C) " "  SpO2: 97%   Weight: 232 lb (105 kg)   Height: 71\" (180.3 cm)         Physical Exam   Constitutional: He is oriented to person, place, and time. He appears well-developed and well-nourished. No distress.   HENT:   Head: Normocephalic and atraumatic.   Mouth/Throat: Oropharynx is clear and moist. No oropharyngeal exudate.   Eyes: Pupils are equal, round, and reactive to light.   Neck: Normal range of motion. Neck supple.   Cardiovascular: Normal rate, regular rhythm and normal heart sounds.    Pulmonary/Chest: Effort normal and breath sounds normal. No respiratory distress. He has no wheezes. He has no rales.   Abdominal: Soft. Bowel sounds are normal. He exhibits no distension and no mass. There is no hepatomegaly. There is tenderness (mild, no rebound or guarding. ) in the right lower quadrant. There is no rebound and no guarding. A hernia is present. Hernia confirmed positive in the ventral area (soft, non-tender, above umbilicus).   Lymphadenopathy:     He has no cervical adenopathy.   Neurological: He is alert and oriented to person, place, and time. No cranial nerve deficit.   Skin: No rash (over abdomen) noted.   Psychiatric: He has a normal mood and affect. His behavior is normal.   Vitals reviewed.      Assessment/ Plan  Diagnoses and all orders for this visit:    Change in stool caliber  -     Ambulatory Referral to Gastroenterology    Constipation, unspecified constipation type  -     polyethylene glycol (MIRALAX) packet; Take 17 g by mouth Daily.  -     Ambulatory Referral to Gastroenterology    Ventral hernia without obstruction or gangrene  -     Ambulatory Referral to Gastroenterology    Obesity (BMI 30-39.9)  -     Ambulatory Referral to Gastroenterology    Lower abdominal pain  -     Ambulatory Referral to Gastroenterology    Malaise  -     Ambulatory Referral to Gastroenterology        Return for Next scheduled follow up.      Discussion:  Matias SIMÓN Hughes is a 47 y.o. male RTC in acute care with " persistent RLQ abdominal pain x 3 + months with recent resolution of N/V with PPI dosing. However, pt has continued to feel worse over last 2-3 months with profound malaise and constipation/ bloating now.  Pt had CT scan in 6/2017 that revealed only supraumbilical ventral hernia containing fat only and non-specific bladder wall thickening (neither of which I suspect is related to sx) with noted normal appendix. Most concerning, pt now has change in stool caliber noting skinnier stools.  I think pt needs ASAP GI eval and C-scope +/- EGD to eval.  Will refer pt on urgent basis.  C/W PPI for now as he is.  Start Miralax daily to see if constipation, and by extension sx, will improve.  RTC or call if sx progress, F/C, rapid weight loss, or blood in stool.  Counseled on red flag signs for ventral hernia.

## 2017-10-19 ENCOUNTER — TELEPHONE (OUTPATIENT)
Dept: INTERNAL MEDICINE | Facility: CLINIC | Age: 47
End: 2017-10-19

## 2017-10-19 NOTE — TELEPHONE ENCOUNTER
Siria at Dr. Win office called stating that they do not have any sooner appointments. However they do have frequent cancellations. They plan on placing Matias at the Top of cancel list.  Dr. Meehan said if they did not reschedule him by end of next week he would find someplace to work him in.

## 2017-10-19 NOTE — TELEPHONE ENCOUNTER
I spoke with Dr Win office. They are sending a message to  re: patients sx and will try to get him scheduled sooner     They will call our office with notification.

## 2017-10-19 NOTE — TELEPHONE ENCOUNTER
Pt stated that he scheduled the Cscope and they are booked out until the 2nd week of December.   Pt wanted to know if you are okay with him waiting until December for this or if you want him to go to a [facility] doctor he will change.     Would you want the pt to switch to a [facility] doctor or wait and have his cscope with [facility] in December?

## 2017-10-26 ENCOUNTER — APPOINTMENT (OUTPATIENT)
Dept: ONCOLOGY | Facility: HOSPITAL | Age: 47
End: 2017-10-26

## 2017-10-26 ENCOUNTER — APPOINTMENT (OUTPATIENT)
Dept: OTHER | Facility: HOSPITAL | Age: 47
End: 2017-10-26

## 2017-10-30 PROBLEM — K21.00 REFLUX ESOPHAGITIS: Status: ACTIVE | Noted: 2017-10-30

## 2017-11-10 ENCOUNTER — LAB (OUTPATIENT)
Dept: OTHER | Facility: HOSPITAL | Age: 47
End: 2017-11-10

## 2017-11-10 DIAGNOSIS — E83.110 HEREDITARY HEMOCHROMATOSIS (HCC): Primary | ICD-10-CM

## 2017-11-10 LAB
BASOPHILS # BLD AUTO: 0.01 10*3/MM3 (ref 0–0.2)
BASOPHILS NFR BLD AUTO: 0.2 % (ref 0–1.5)
DEPRECATED RDW RBC AUTO: 44.2 FL (ref 37–54)
EOSINOPHIL # BLD AUTO: 0.08 10*3/MM3 (ref 0–0.7)
EOSINOPHIL NFR BLD AUTO: 1.6 % (ref 0.3–6.2)
ERYTHROCYTE [DISTWIDTH] IN BLOOD BY AUTOMATED COUNT: 13.7 % (ref 11.5–14.5)
FERRITIN SERPL-MCNC: 101 NG/ML (ref 30–400)
HCT VFR BLD AUTO: 42.3 % (ref 40.4–52.2)
HGB BLD-MCNC: 14.6 G/DL (ref 13.7–17.6)
IMM GRANULOCYTES # BLD: 0.01 10*3/MM3 (ref 0–0.03)
IMM GRANULOCYTES NFR BLD: 0.2 % (ref 0–0.5)
LYMPHOCYTES # BLD AUTO: 1.65 10*3/MM3 (ref 0.9–4.8)
LYMPHOCYTES NFR BLD AUTO: 32.1 % (ref 19.6–45.3)
MCH RBC QN AUTO: 30.4 PG (ref 27–32.7)
MCHC RBC AUTO-ENTMCNC: 34.5 G/DL (ref 32.6–36.4)
MCV RBC AUTO: 87.9 FL (ref 79.8–96.2)
MONOCYTES # BLD AUTO: 0.46 10*3/MM3 (ref 0.2–1.2)
MONOCYTES NFR BLD AUTO: 8.9 % (ref 5–12)
NEUTROPHILS # BLD AUTO: 2.93 10*3/MM3 (ref 1.9–8.1)
NEUTROPHILS NFR BLD AUTO: 57 % (ref 42.7–76)
NRBC BLD MANUAL-RTO: 0 /100 WBC (ref 0–0)
PLATELET # BLD AUTO: 164 10*3/MM3 (ref 140–500)
PMV BLD AUTO: 10.3 FL (ref 6–12)
RBC # BLD AUTO: 4.81 10*6/MM3 (ref 4.6–6)
WBC NRBC COR # BLD: 5.14 10*3/MM3 (ref 4.5–10.7)

## 2017-11-10 PROCEDURE — 82728 ASSAY OF FERRITIN: CPT | Performed by: INTERNAL MEDICINE

## 2017-11-10 PROCEDURE — 36415 COLL VENOUS BLD VENIPUNCTURE: CPT

## 2017-11-10 PROCEDURE — 85025 COMPLETE CBC W/AUTO DIFF WBC: CPT | Performed by: INTERNAL MEDICINE

## 2017-11-17 ENCOUNTER — OFFICE VISIT (OUTPATIENT)
Dept: ONCOLOGY | Facility: CLINIC | Age: 47
End: 2017-11-17

## 2017-11-17 ENCOUNTER — APPOINTMENT (OUTPATIENT)
Dept: OTHER | Facility: HOSPITAL | Age: 47
End: 2017-11-17

## 2017-11-17 ENCOUNTER — INFUSION (OUTPATIENT)
Dept: ONCOLOGY | Facility: HOSPITAL | Age: 47
End: 2017-11-17

## 2017-11-17 VITALS
WEIGHT: 233.6 LBS | TEMPERATURE: 97.8 F | RESPIRATION RATE: 16 BRPM | HEART RATE: 100 BPM | OXYGEN SATURATION: 97 % | BODY MASS INDEX: 32.7 KG/M2 | HEIGHT: 71 IN | DIASTOLIC BLOOD PRESSURE: 88 MMHG | SYSTOLIC BLOOD PRESSURE: 126 MMHG

## 2017-11-17 VITALS — DIASTOLIC BLOOD PRESSURE: 89 MMHG | SYSTOLIC BLOOD PRESSURE: 134 MMHG | HEART RATE: 82 BPM

## 2017-11-17 DIAGNOSIS — E83.110 HEREDITARY HEMOCHROMATOSIS (HCC): ICD-10-CM

## 2017-11-17 DIAGNOSIS — K21.00 REFLUX ESOPHAGITIS: ICD-10-CM

## 2017-11-17 DIAGNOSIS — K27.9 PEPTIC ULCER DISEASE: ICD-10-CM

## 2017-11-17 DIAGNOSIS — E66.9 OBESITY (BMI 30-39.9): Primary | ICD-10-CM

## 2017-11-17 PROCEDURE — 99214 OFFICE O/P EST MOD 30 MIN: CPT | Performed by: INTERNAL MEDICINE

## 2017-11-17 NOTE — PROGRESS NOTES
After 300cc removed, pt c/o feeling hot and became pale.  Pt reclined and laid flat and phlebotomy stopped.  250NS given IV and pt denied any more complaints.  Pt b/p 139/84 at discharge.  Pt instructed to increase PO fluids and to call with any concerns.

## 2017-11-17 NOTE — PROGRESS NOTES
Subjective   REASON FOR FOLLOWUP: Hereditary hemochromatosis, undergoing phlebotomy with the goal of getting his ferritin around 50.     HISTORY OF PRESENT ILLNESS:   Mr. Hughes is a pleasant 47 y.o. gentleman with hereditary hemochromatosis, undergoing therapeutic phlebotomy to try to keep his ferritin below 100 and closer to 50, if possible. He has been having his ferritin drawn every 2-3 months.    He looks great in the office today.  His ferritin level from 11/10/2017 was 101 therefore we will proceed with phlebotomy in the office today and continue checking his labs every 2 months.    He was having some GI symptoms and subsequently underwent an EGD and colonoscopy with Dr. Meehan.  He was found to have some reflux esophagitis and has been taking Carafate and Protonix.    History of Present Illness      Past Medical History, Past Surgical History, Social History, Family History have been reviewed and are without significant changes except as mentioned.    Review of Systems   Constitutional: Negative for activity change, appetite change, fatigue, fever and unexpected weight change.   HENT: Negative for hearing loss, nosebleeds, trouble swallowing and voice change.    Eyes: Negative for visual disturbance.   Respiratory: Negative for cough, shortness of breath and wheezing.    Cardiovascular: Negative for chest pain and palpitations.   Gastrointestinal: Negative for abdominal pain, diarrhea, nausea and vomiting.   Genitourinary: Negative for difficulty urinating, frequency, hematuria and urgency.   Musculoskeletal: Negative for back pain and neck pain.   Skin: Negative for rash.   Neurological: Negative for dizziness, seizures, syncope and headaches.   Hematological: Negative for adenopathy. Does not bruise/bleed easily.   Psychiatric/Behavioral: Negative for behavioral problems. The patient is not nervous/anxious.       A comprehensive 14 point review of systems was performed and was negative except as  "mentioned.    Medications:  The current medication list was reviewed in the EMR    ALLERGIES:    Allergies   Allergen Reactions   • Cefdinir        Objective      Vitals:    11/17/17 1532   BP: 126/88   Pulse: 100   Resp: 16   Temp: 97.8 °F (36.6 °C)   TempSrc: Oral   SpO2: 97%   Weight: 233 lb 9.6 oz (106 kg)   Height: 71.26\" (181 cm)  Comment: new ht   PainSc: 0-No pain     Current Status 11/17/2017   ECOG score 0       Physical Exam   Constitutional: He is oriented to person, place, and time. He appears well-developed and well-nourished. No distress.   HENT:   Head: Normocephalic.   Eyes: Conjunctivae and EOM are normal. Pupils are equal, round, and reactive to light. No scleral icterus.   Neck: Normal range of motion. Neck supple. No JVD present. No thyromegaly present.   Cardiovascular: Normal rate and regular rhythm.  Exam reveals no gallop and no friction rub.    No murmur heard.  Pulmonary/Chest: Effort normal and breath sounds normal. He has no wheezes. He has no rales.   Abdominal: Soft. He exhibits no distension and no mass. There is no tenderness.   Musculoskeletal: Normal range of motion. He exhibits no edema or deformity.   Lymphadenopathy:     He has no cervical adenopathy.   Neurological: He is alert and oriented to person, place, and time. He has normal reflexes. No cranial nerve deficit.   Skin: Skin is warm and dry. No rash noted. No erythema.   Psychiatric: He has a normal mood and affect. His behavior is normal. Judgment normal.         RECENT LABS:  Hematology WBC   Date Value Ref Range Status   11/10/2017 5.14 4.50 - 10.70 10*3/mm3 Final     RBC   Date Value Ref Range Status   11/10/2017 4.81 4.60 - 6.00 10*6/mm3 Final     Hemoglobin   Date Value Ref Range Status   11/10/2017 14.6 13.7 - 17.6 g/dL Final     Hematocrit   Date Value Ref Range Status   11/10/2017 42.3 40.4 - 52.2 % Final     Platelets   Date Value Ref Range Status   11/10/2017 164 140 - 500 10*3/mm3 Final            Lab Results "   Component Value Date    FERRITIN 101.00 11/10/2017       Assessment/Plan     ASSESSMENT:   1. Hemochromatosis, undergoing therapeutic phlebotomy with the goal of keeping his ferritin around 50.   2. Intermittent mild thrombocytopenia, most likely due to chronic ITP. Platelets were normal on his labs last week.  3.  Partial small bowel obstruction due to adhesions last September 2016.  This resolved spontaneously and is not having any further symptoms at this time.  4. Reflux esophagitis.  He had recent colonoscopy and EGD showing some esophagitis and has been taking Protonix and Carafate and will be having a follow-up EGD.  PLAN:   1. Mr. Hughes will undergo phleboto my 500 cc in the office today along with 250 cc saline hydration.   2. He will be returning every 2 months for lab with R.N. review and phlebotomy p.r.n.   3. He will see a physician again in the office in 12 months.                 11/17/2017      CC:

## 2018-01-12 ENCOUNTER — APPOINTMENT (OUTPATIENT)
Dept: ONCOLOGY | Facility: HOSPITAL | Age: 48
End: 2018-01-12

## 2018-01-12 ENCOUNTER — APPOINTMENT (OUTPATIENT)
Dept: OTHER | Facility: HOSPITAL | Age: 48
End: 2018-01-12

## 2018-01-19 ENCOUNTER — LAB (OUTPATIENT)
Dept: OTHER | Facility: HOSPITAL | Age: 48
End: 2018-01-19

## 2018-01-19 ENCOUNTER — INFUSION (OUTPATIENT)
Dept: ONCOLOGY | Facility: HOSPITAL | Age: 48
End: 2018-01-19

## 2018-01-19 DIAGNOSIS — E83.110 HEREDITARY HEMOCHROMATOSIS (HCC): ICD-10-CM

## 2018-01-19 DIAGNOSIS — E83.110 HEREDITARY HEMOCHROMATOSIS (HCC): Primary | ICD-10-CM

## 2018-01-19 DIAGNOSIS — K21.00 REFLUX ESOPHAGITIS: ICD-10-CM

## 2018-01-19 DIAGNOSIS — K27.9 PEPTIC ULCER DISEASE: ICD-10-CM

## 2018-01-19 DIAGNOSIS — E66.9 OBESITY (BMI 30-39.9): ICD-10-CM

## 2018-01-19 LAB
BASOPHILS # BLD AUTO: 0.01 10*3/MM3 (ref 0–0.2)
BASOPHILS NFR BLD AUTO: 0.2 % (ref 0–1.5)
DEPRECATED RDW RBC AUTO: 43.8 FL (ref 37–54)
EOSINOPHIL # BLD AUTO: 0.05 10*3/MM3 (ref 0–0.7)
EOSINOPHIL NFR BLD AUTO: 1.2 % (ref 0.3–6.2)
ERYTHROCYTE [DISTWIDTH] IN BLOOD BY AUTOMATED COUNT: 13.6 % (ref 11.5–14.5)
FERRITIN SERPL-MCNC: 146 NG/ML (ref 30–400)
HCT VFR BLD AUTO: 40.8 % (ref 40.4–52.2)
HGB BLD-MCNC: 14.2 G/DL (ref 13.7–17.6)
IMM GRANULOCYTES # BLD: 0 10*3/MM3 (ref 0–0.03)
IMM GRANULOCYTES NFR BLD: 0 % (ref 0–0.5)
LYMPHOCYTES # BLD AUTO: 1.59 10*3/MM3 (ref 0.9–4.8)
LYMPHOCYTES NFR BLD AUTO: 39 % (ref 19.6–45.3)
MCH RBC QN AUTO: 30.2 PG (ref 27–32.7)
MCHC RBC AUTO-ENTMCNC: 34.8 G/DL (ref 32.6–36.4)
MCV RBC AUTO: 86.8 FL (ref 79.8–96.2)
MONOCYTES # BLD AUTO: 0.3 10*3/MM3 (ref 0.2–1.2)
MONOCYTES NFR BLD AUTO: 7.4 % (ref 5–12)
NEUTROPHILS # BLD AUTO: 2.13 10*3/MM3 (ref 1.9–8.1)
NEUTROPHILS NFR BLD AUTO: 52.2 % (ref 42.7–76)
NRBC BLD MANUAL-RTO: 0 /100 WBC (ref 0–0)
PLATELET # BLD AUTO: 183 10*3/MM3 (ref 140–500)
PMV BLD AUTO: 9.5 FL (ref 6–12)
RBC # BLD AUTO: 4.7 10*6/MM3 (ref 4.6–6)
WBC NRBC COR # BLD: 4.08 10*3/MM3 (ref 4.5–10.7)

## 2018-01-19 PROCEDURE — 85025 COMPLETE CBC W/AUTO DIFF WBC: CPT | Performed by: INTERNAL MEDICINE

## 2018-01-19 PROCEDURE — 36415 COLL VENOUS BLD VENIPUNCTURE: CPT

## 2018-01-19 PROCEDURE — 82728 ASSAY OF FERRITIN: CPT | Performed by: INTERNAL MEDICINE

## 2018-01-19 PROCEDURE — 99195 PHLEBOTOMY: CPT | Performed by: NURSE PRACTITIONER

## 2018-01-19 RX ORDER — SODIUM CHLORIDE 9 MG/ML
250 INJECTION, SOLUTION INTRAVENOUS ONCE
Status: CANCELLED | OUTPATIENT
Start: 2018-01-19

## 2018-01-19 RX ORDER — SODIUM CHLORIDE 9 MG/ML
250 INJECTION, SOLUTION INTRAVENOUS ONCE
Status: DISCONTINUED | OUTPATIENT
Start: 2018-01-19 | End: 2018-01-19 | Stop reason: HOSPADM

## 2018-03-09 ENCOUNTER — LAB (OUTPATIENT)
Dept: OTHER | Facility: HOSPITAL | Age: 48
End: 2018-03-09

## 2018-03-09 ENCOUNTER — INFUSION (OUTPATIENT)
Dept: ONCOLOGY | Facility: HOSPITAL | Age: 48
End: 2018-03-09

## 2018-03-09 VITALS
OXYGEN SATURATION: 96 % | HEART RATE: 68 BPM | SYSTOLIC BLOOD PRESSURE: 143 MMHG | TEMPERATURE: 98 F | DIASTOLIC BLOOD PRESSURE: 87 MMHG

## 2018-03-09 DIAGNOSIS — E83.110 HEREDITARY HEMOCHROMATOSIS (HCC): ICD-10-CM

## 2018-03-09 DIAGNOSIS — E66.9 OBESITY (BMI 30-39.9): ICD-10-CM

## 2018-03-09 DIAGNOSIS — K21.00 REFLUX ESOPHAGITIS: ICD-10-CM

## 2018-03-09 DIAGNOSIS — K27.9 PEPTIC ULCER DISEASE: ICD-10-CM

## 2018-03-09 LAB
BASOPHILS # BLD AUTO: 0.01 10*3/MM3 (ref 0–0.2)
BASOPHILS NFR BLD AUTO: 0.2 % (ref 0–1.5)
DEPRECATED RDW RBC AUTO: 44.5 FL (ref 37–54)
EOSINOPHIL # BLD AUTO: 0 10*3/MM3 (ref 0–0.7)
EOSINOPHIL NFR BLD AUTO: 0 % (ref 0.3–6.2)
ERYTHROCYTE [DISTWIDTH] IN BLOOD BY AUTOMATED COUNT: 13.8 % (ref 11.5–14.5)
FERRITIN SERPL-MCNC: 40.6 NG/ML (ref 30–400)
HCT VFR BLD AUTO: 42.7 % (ref 40.4–52.2)
HGB BLD-MCNC: 14.3 G/DL (ref 13.7–17.6)
IMM GRANULOCYTES # BLD: 0.01 10*3/MM3 (ref 0–0.03)
IMM GRANULOCYTES NFR BLD: 0.2 % (ref 0–0.5)
LYMPHOCYTES # BLD AUTO: 1.5 10*3/MM3 (ref 0.9–4.8)
LYMPHOCYTES NFR BLD AUTO: 33.7 % (ref 19.6–45.3)
MCH RBC QN AUTO: 29.5 PG (ref 27–32.7)
MCHC RBC AUTO-ENTMCNC: 33.5 G/DL (ref 32.6–36.4)
MCV RBC AUTO: 88 FL (ref 79.8–96.2)
MONOCYTES # BLD AUTO: 0.36 10*3/MM3 (ref 0.2–1.2)
MONOCYTES NFR BLD AUTO: 8.1 % (ref 5–12)
NEUTROPHILS # BLD AUTO: 2.57 10*3/MM3 (ref 1.9–8.1)
NEUTROPHILS NFR BLD AUTO: 57.8 % (ref 42.7–76)
NRBC BLD MANUAL-RTO: 0 /100 WBC (ref 0–0)
PLATELET # BLD AUTO: 147 10*3/MM3 (ref 140–500)
PMV BLD AUTO: 10.2 FL (ref 6–12)
RBC # BLD AUTO: 4.85 10*6/MM3 (ref 4.6–6)
WBC NRBC COR # BLD: 4.45 10*3/MM3 (ref 4.5–10.7)

## 2018-03-09 PROCEDURE — 82728 ASSAY OF FERRITIN: CPT | Performed by: INTERNAL MEDICINE

## 2018-03-09 PROCEDURE — 85025 COMPLETE CBC W/AUTO DIFF WBC: CPT | Performed by: INTERNAL MEDICINE

## 2018-05-04 ENCOUNTER — APPOINTMENT (OUTPATIENT)
Dept: ONCOLOGY | Facility: HOSPITAL | Age: 48
End: 2018-05-04

## 2018-05-04 ENCOUNTER — APPOINTMENT (OUTPATIENT)
Dept: OTHER | Facility: HOSPITAL | Age: 48
End: 2018-05-04

## 2018-05-17 ENCOUNTER — INFUSION (OUTPATIENT)
Dept: ONCOLOGY | Facility: HOSPITAL | Age: 48
End: 2018-05-17

## 2018-05-17 ENCOUNTER — LAB (OUTPATIENT)
Dept: OTHER | Facility: HOSPITAL | Age: 48
End: 2018-05-17

## 2018-05-17 VITALS
OXYGEN SATURATION: 95 % | BODY MASS INDEX: 28.83 KG/M2 | DIASTOLIC BLOOD PRESSURE: 78 MMHG | TEMPERATURE: 97.7 F | WEIGHT: 208.2 LBS | SYSTOLIC BLOOD PRESSURE: 114 MMHG | HEART RATE: 79 BPM

## 2018-05-17 DIAGNOSIS — K27.9 PEPTIC ULCER DISEASE: ICD-10-CM

## 2018-05-17 DIAGNOSIS — K21.00 REFLUX ESOPHAGITIS: ICD-10-CM

## 2018-05-17 DIAGNOSIS — E66.9 OBESITY (BMI 30-39.9): ICD-10-CM

## 2018-05-17 DIAGNOSIS — E83.110 HEREDITARY HEMOCHROMATOSIS (HCC): ICD-10-CM

## 2018-05-17 LAB
BASOPHILS # BLD AUTO: 0.02 10*3/MM3 (ref 0–0.2)
BASOPHILS NFR BLD AUTO: 0.4 % (ref 0–1.5)
DEPRECATED RDW RBC AUTO: 46.5 FL (ref 37–54)
EOSINOPHIL # BLD AUTO: 0.07 10*3/MM3 (ref 0–0.7)
EOSINOPHIL NFR BLD AUTO: 1.3 % (ref 0.3–6.2)
ERYTHROCYTE [DISTWIDTH] IN BLOOD BY AUTOMATED COUNT: 14.5 % (ref 11.5–14.5)
FERRITIN SERPL-MCNC: 65.4 NG/ML (ref 30–400)
HCT VFR BLD AUTO: 43 % (ref 40.4–52.2)
HGB BLD-MCNC: 14.9 G/DL (ref 13.7–17.6)
IMM GRANULOCYTES # BLD: 0.01 10*3/MM3 (ref 0–0.03)
IMM GRANULOCYTES NFR BLD: 0.2 % (ref 0–0.5)
LYMPHOCYTES # BLD AUTO: 1.47 10*3/MM3 (ref 0.9–4.8)
LYMPHOCYTES NFR BLD AUTO: 27.3 % (ref 19.6–45.3)
MCH RBC QN AUTO: 30.2 PG (ref 27–32.7)
MCHC RBC AUTO-ENTMCNC: 34.7 G/DL (ref 32.6–36.4)
MCV RBC AUTO: 87.2 FL (ref 79.8–96.2)
MONOCYTES # BLD AUTO: 0.37 10*3/MM3 (ref 0.2–1.2)
MONOCYTES NFR BLD AUTO: 6.9 % (ref 5–12)
NEUTROPHILS # BLD AUTO: 3.44 10*3/MM3 (ref 1.9–8.1)
NEUTROPHILS NFR BLD AUTO: 63.9 % (ref 42.7–76)
NRBC BLD MANUAL-RTO: 0 /100 WBC (ref 0–0)
PLATELET # BLD AUTO: 171 10*3/MM3 (ref 140–500)
PMV BLD AUTO: 10.2 FL (ref 6–12)
RBC # BLD AUTO: 4.93 10*6/MM3 (ref 4.6–6)
WBC NRBC COR # BLD: 5.38 10*3/MM3 (ref 4.5–10.7)

## 2018-05-17 PROCEDURE — 82728 ASSAY OF FERRITIN: CPT | Performed by: INTERNAL MEDICINE

## 2018-05-17 PROCEDURE — 85025 COMPLETE CBC W/AUTO DIFF WBC: CPT | Performed by: INTERNAL MEDICINE

## 2018-05-17 PROCEDURE — 36415 COLL VENOUS BLD VENIPUNCTURE: CPT

## 2018-07-16 RX ORDER — SODIUM CHLORIDE 9 MG/ML
250 INJECTION, SOLUTION INTRAVENOUS ONCE
Status: CANCELLED | OUTPATIENT
Start: 2018-07-18

## 2018-07-18 ENCOUNTER — LAB (OUTPATIENT)
Dept: OTHER | Facility: HOSPITAL | Age: 48
End: 2018-07-18

## 2018-07-18 ENCOUNTER — INFUSION (OUTPATIENT)
Dept: ONCOLOGY | Facility: HOSPITAL | Age: 48
End: 2018-07-18

## 2018-07-18 DIAGNOSIS — K27.9 PEPTIC ULCER DISEASE: ICD-10-CM

## 2018-07-18 DIAGNOSIS — K21.00 REFLUX ESOPHAGITIS: ICD-10-CM

## 2018-07-18 DIAGNOSIS — E66.9 OBESITY (BMI 30-39.9): ICD-10-CM

## 2018-07-18 DIAGNOSIS — E83.110 HEREDITARY HEMOCHROMATOSIS (HCC): Primary | ICD-10-CM

## 2018-07-18 DIAGNOSIS — E83.110 HEREDITARY HEMOCHROMATOSIS (HCC): ICD-10-CM

## 2018-07-18 LAB
BASOPHILS # BLD AUTO: 0.02 10*3/MM3 (ref 0–0.2)
BASOPHILS NFR BLD AUTO: 0.5 % (ref 0–1.5)
DEPRECATED RDW RBC AUTO: 45.7 FL (ref 37–54)
EOSINOPHIL # BLD AUTO: 0.07 10*3/MM3 (ref 0–0.7)
EOSINOPHIL NFR BLD AUTO: 1.6 % (ref 0.3–6.2)
ERYTHROCYTE [DISTWIDTH] IN BLOOD BY AUTOMATED COUNT: 13.9 % (ref 11.5–14.5)
FERRITIN SERPL-MCNC: 52.4 NG/ML (ref 30–400)
HCT VFR BLD AUTO: 42.2 % (ref 40.4–52.2)
HGB BLD-MCNC: 14.6 G/DL (ref 13.7–17.6)
IMM GRANULOCYTES # BLD: 0.01 10*3/MM3 (ref 0–0.03)
IMM GRANULOCYTES NFR BLD: 0.2 % (ref 0–0.5)
LYMPHOCYTES # BLD AUTO: 1.5 10*3/MM3 (ref 0.9–4.8)
LYMPHOCYTES NFR BLD AUTO: 35.2 % (ref 19.6–45.3)
MCH RBC QN AUTO: 30.8 PG (ref 27–32.7)
MCHC RBC AUTO-ENTMCNC: 34.6 G/DL (ref 32.6–36.4)
MCV RBC AUTO: 89 FL (ref 79.8–96.2)
MONOCYTES # BLD AUTO: 0.34 10*3/MM3 (ref 0.2–1.2)
MONOCYTES NFR BLD AUTO: 8 % (ref 5–12)
NEUTROPHILS # BLD AUTO: 2.32 10*3/MM3 (ref 1.9–8.1)
NEUTROPHILS NFR BLD AUTO: 54.5 % (ref 42.7–76)
NRBC BLD MANUAL-RTO: 0 /100 WBC (ref 0–0)
PLATELET # BLD AUTO: 151 10*3/MM3 (ref 140–500)
PMV BLD AUTO: 9.5 FL (ref 6–12)
RBC # BLD AUTO: 4.74 10*6/MM3 (ref 4.6–6)
WBC NRBC COR # BLD: 4.26 10*3/MM3 (ref 4.5–10.7)

## 2018-07-18 PROCEDURE — 36415 COLL VENOUS BLD VENIPUNCTURE: CPT

## 2018-07-18 PROCEDURE — 85025 COMPLETE CBC W/AUTO DIFF WBC: CPT | Performed by: INTERNAL MEDICINE

## 2018-07-18 PROCEDURE — 82728 ASSAY OF FERRITIN: CPT | Performed by: INTERNAL MEDICINE

## 2018-07-18 RX ORDER — SODIUM CHLORIDE 9 MG/ML
250 INJECTION, SOLUTION INTRAVENOUS ONCE
Status: CANCELLED | OUTPATIENT
Start: 2018-07-18

## 2018-10-11 ENCOUNTER — OFFICE VISIT (OUTPATIENT)
Dept: INTERNAL MEDICINE | Facility: CLINIC | Age: 48
End: 2018-10-11

## 2018-10-11 VITALS
HEART RATE: 73 BPM | TEMPERATURE: 98.7 F | OXYGEN SATURATION: 99 % | HEIGHT: 71 IN | DIASTOLIC BLOOD PRESSURE: 92 MMHG | SYSTOLIC BLOOD PRESSURE: 142 MMHG | BODY MASS INDEX: 30.1 KG/M2 | WEIGHT: 215 LBS

## 2018-10-11 DIAGNOSIS — N41.0 ACUTE PROSTATITIS: Primary | ICD-10-CM

## 2018-10-11 DIAGNOSIS — R10.30 LOWER ABDOMINAL PAIN: ICD-10-CM

## 2018-10-11 LAB
BASOPHILS # BLD AUTO: 0.01 10*3/MM3 (ref 0–0.2)
BASOPHILS NFR BLD AUTO: 0.2 % (ref 0–1.5)
BILIRUB BLD-MCNC: NEGATIVE MG/DL
BUN SERPL-MCNC: 17 MG/DL (ref 6–20)
BUN/CREAT SERPL: 17.5 (ref 7–25)
CALCIUM SERPL-MCNC: 9.6 MG/DL (ref 8.6–10.5)
CHLORIDE SERPL-SCNC: 99 MMOL/L (ref 98–107)
CLARITY, POC: CLEAR
CO2 SERPL-SCNC: 27.5 MMOL/L (ref 22–29)
COLOR UR: YELLOW
CREAT SERPL-MCNC: 0.97 MG/DL (ref 0.76–1.27)
EOSINOPHIL # BLD AUTO: 0.09 10*3/MM3 (ref 0–0.7)
EOSINOPHIL NFR BLD AUTO: 1.8 % (ref 0.3–6.2)
ERYTHROCYTE [DISTWIDTH] IN BLOOD BY AUTOMATED COUNT: 14.1 % (ref 11.5–14.5)
GLUCOSE SERPL-MCNC: 89 MG/DL (ref 65–99)
GLUCOSE UR STRIP-MCNC: NEGATIVE MG/DL
HCT VFR BLD AUTO: 45.2 % (ref 40.4–52.2)
HGB BLD-MCNC: 15.4 G/DL (ref 13.7–17.6)
IMM GRANULOCYTES # BLD: 0.01 10*3/MM3 (ref 0–0.03)
IMM GRANULOCYTES NFR BLD: 0.2 % (ref 0–0.5)
KETONES UR QL: NEGATIVE
LEUKOCYTE EST, POC: NEGATIVE
LYMPHOCYTES # BLD AUTO: 1.61 10*3/MM3 (ref 0.9–4.8)
LYMPHOCYTES NFR BLD AUTO: 32.1 % (ref 19.6–45.3)
MCH RBC QN AUTO: 30.7 PG (ref 27–32.7)
MCHC RBC AUTO-ENTMCNC: 34.1 G/DL (ref 32.6–36.4)
MCV RBC AUTO: 90.2 FL (ref 79.8–96.2)
MONOCYTES # BLD AUTO: 0.38 10*3/MM3 (ref 0.2–1.2)
MONOCYTES NFR BLD AUTO: 7.6 % (ref 5–12)
NEUTROPHILS # BLD AUTO: 2.93 10*3/MM3 (ref 1.9–8.1)
NEUTROPHILS NFR BLD AUTO: 58.3 % (ref 42.7–76)
NITRITE UR-MCNC: NEGATIVE MG/ML
PH UR: 6 [PH] (ref 5–8)
PLATELET # BLD AUTO: 170 10*3/MM3 (ref 140–500)
POTASSIUM SERPL-SCNC: 4.5 MMOL/L (ref 3.5–5.2)
PROT UR STRIP-MCNC: NEGATIVE MG/DL
RBC # BLD AUTO: 5.01 10*6/MM3 (ref 4.6–6)
RBC # UR STRIP: NEGATIVE /UL
SODIUM SERPL-SCNC: 141 MMOL/L (ref 136–145)
SP GR UR: 1.02 (ref 1–1.03)
UROBILINOGEN UR QL: ABNORMAL
WBC # BLD AUTO: 5.02 10*3/MM3 (ref 4.5–10.7)

## 2018-10-11 PROCEDURE — 81003 URINALYSIS AUTO W/O SCOPE: CPT | Performed by: INTERNAL MEDICINE

## 2018-10-11 PROCEDURE — 99214 OFFICE O/P EST MOD 30 MIN: CPT | Performed by: INTERNAL MEDICINE

## 2018-10-11 RX ORDER — CIPROFLOXACIN 500 MG/1
500 TABLET, FILM COATED ORAL EVERY 12 HOURS SCHEDULED
Qty: 56 TABLET | Refills: 0 | Status: SHIPPED | OUTPATIENT
Start: 2018-10-11 | End: 2018-11-08

## 2018-10-11 RX ORDER — OMEPRAZOLE 20 MG/1
20 CAPSULE, DELAYED RELEASE ORAL
COMMUNITY
End: 2019-06-04

## 2018-10-11 NOTE — PROGRESS NOTES
"Urinary Tract Infection (Poss ) and Flank Pain (Left flank pain )      HPI  Matias Hughes is a 48 y.o. male RTC in acute care:  \"I am trying to figure out if I have a urinary tract infection\". Wife had one a few weeks ago and made pt think about that.  Started with some burning with urination a few days ago, 4-5 days ago.  Had some intermittent pain below umbilicus which had in past with prostatitis. No fevers.  Felt a bit \"feverish\" and took temp and did not have fever. Has some mild L flank pain when gets up in AM over last few months.  No blood in urine. Pain is at tip of penis.  Bowels are OK, no pain with BM's.  No blood in stool.  No N/V.   No new sexual partners. No hx of exposure to STD.    Recalls prostatitis was in 2010 and took Abx.  Not had issues with that since that time. Overall went away. Takes Aleve from time to time if \"flares\" but has not bothered pt in 'some time'.     Review of Systems   Constitution: Negative for chills and fever.   Cardiovascular: Negative for dyspnea on exertion.   Respiratory: Negative for cough (mild in last few days, but does not associated iwth urinary sx ) and shortness of breath.    Skin: Negative for rash and suspicious lesions.   Gastrointestinal: Negative for change in bowel habit, diarrhea, nausea and vomiting.   Genitourinary: Positive for dysuria (at tip of penis), flank pain (on L, in AM when wakes) and frequency (in last few days. ). Negative for bladder incontinence, hematuria, hesitancy and incomplete emptying.       The following portions of the patient's history were reviewed and updated as appropriate: allergies, current medications, past medical history and problem list.      Current Outpatient Prescriptions:   •  cholecalciferol (VITAMIN D3) 1000 UNITS tablet, Take 1 tablet by mouth Daily., Disp: 30 tablet, Rfl: 5  •  omeprazole (priLOSEC) 20 MG capsule, Take 20 mg by mouth., Disp: , Rfl:   •  polyethylene glycol (MIRALAX) packet, Take 17 g by mouth " "Daily., Disp: 12 each, Rfl: 2  •  ciprofloxacin (CIPRO) 500 MG tablet, Take 1 tablet by mouth Every 12 (Twelve) Hours for 28 days., Disp: 56 tablet, Rfl: 0    Vitals:    10/11/18 1413   BP: 142/92   Pulse: 73   Temp: 98.7 °F (37.1 °C)   SpO2: 99%   Weight: 97.5 kg (215 lb)   Height: 181 cm (71.26\")         Physical Exam   Constitutional: He is oriented to person, place, and time. He appears well-developed and well-nourished. No distress.   HENT:   Head: Normocephalic and atraumatic.   Mouth/Throat: Oropharynx is clear and moist. No oropharyngeal exudate.   Eyes: Pupils are equal, round, and reactive to light.   Neck: Normal range of motion. Neck supple. Carotid bruit is not present.   Cardiovascular: Normal rate, regular rhythm and normal heart sounds.    Pulses:       Carotid pulses are 2+ on the right side, and 2+ on the left side.       Radial pulses are 2+ on the right side, and 2+ on the left side.   Pulmonary/Chest: Effort normal and breath sounds normal. No respiratory distress. He has no wheezes. He has no rales.   Abdominal: Soft. Bowel sounds are normal. He exhibits no distension and no mass. There is tenderness in the right lower quadrant, suprapubic area and left lower quadrant. There is no rebound, no guarding and no CVA tenderness.   Musculoskeletal: He exhibits no edema.   Neurological: He is alert and oriented to person, place, and time. No cranial nerve deficit.   Skin: No rash (over trunk) noted.   Psychiatric: He has a normal mood and affect. His behavior is normal.   Vitals reviewed.    Results for orders placed or performed in visit on 10/11/18   POC Urinalysis Dipstick, Automated   Result Value Ref Range    Color Yellow Yellow, Straw, Dark Yellow, Siria    Clarity, UA Clear Clear    Specific Gravity  1.025 1.005 - 1.030    pH, Urine 6.0 5.0 - 8.0    Leukocytes Negative Negative    Nitrite, UA Negative Negative    Protein, POC Negative Negative mg/dL    Glucose, UA Negative Negative, 1000 mg/dL " (3+) mg/dL    Ketones, UA Negative Negative    Urobilinogen, UA 4 E.U./dL  (A) Normal    Bilirubin Negative Negative    Blood, UA Negative Negative       Assessment/ Plan  Diagnoses and all orders for this visit:    Acute prostatitis  -     POC Urinalysis Dipstick, Automated  -     ciprofloxacin (CIPRO) 500 MG tablet; Take 1 tablet by mouth Every 12 (Twelve) Hours for 28 days.  -     CBC & Differential  -     Basic Metabolic Panel    Lower abdominal pain  -     POC Urinalysis Dipstick, Automated  -     CBC & Differential  -     Basic Metabolic Panel    Other orders  -     omeprazole (priLOSEC) 20 MG capsule; Take 20 mg by mouth.        Return in about 3 weeks (around 11/1/2018) for Next scheduled follow up.      Discussion:  Matias Hughes is a 48 y.o. male with hx of chronic prostatitis last treated in ~2010 RTC In acute care with some lower abdominal pain, dysuria, and mild L flank pain. Sx have been brewing over last few weeks, but seem to have accelerated with urinary frequency and dysuria in last few days. Exam reveals some mild, diffuse lower abdominal tenderness and a boggy, tender prostate on exam. I suspect pt is dealing with recurrent prostatitis based on exam and will start pt back on Cipro course for next 28 days for control of sx. Side effects reviewed with pt including risk of tendon damage.  Will check CBC today and BMP while in office. Will RTC in 4 weeks for re-eval. Hold on imaging today as low suspicion will add to clinical picture.     RTC 4 weeks.

## 2018-10-17 ENCOUNTER — APPOINTMENT (OUTPATIENT)
Dept: ONCOLOGY | Facility: HOSPITAL | Age: 48
End: 2018-10-17

## 2018-10-17 ENCOUNTER — APPOINTMENT (OUTPATIENT)
Dept: OTHER | Facility: HOSPITAL | Age: 48
End: 2018-10-17

## 2018-10-18 ENCOUNTER — LAB (OUTPATIENT)
Dept: OTHER | Facility: HOSPITAL | Age: 48
End: 2018-10-18

## 2018-10-18 ENCOUNTER — INFUSION (OUTPATIENT)
Dept: ONCOLOGY | Facility: HOSPITAL | Age: 48
End: 2018-10-18

## 2018-10-18 VITALS
SYSTOLIC BLOOD PRESSURE: 134 MMHG | OXYGEN SATURATION: 98 % | DIASTOLIC BLOOD PRESSURE: 81 MMHG | TEMPERATURE: 98.1 F | HEART RATE: 81 BPM

## 2018-10-18 DIAGNOSIS — K21.00 REFLUX ESOPHAGITIS: ICD-10-CM

## 2018-10-18 DIAGNOSIS — E66.9 OBESITY (BMI 30-39.9): ICD-10-CM

## 2018-10-18 DIAGNOSIS — E83.110 HEREDITARY HEMOCHROMATOSIS (HCC): Primary | ICD-10-CM

## 2018-10-18 DIAGNOSIS — E83.110 HEREDITARY HEMOCHROMATOSIS (HCC): ICD-10-CM

## 2018-10-18 DIAGNOSIS — K27.9 PEPTIC ULCER DISEASE: ICD-10-CM

## 2018-10-18 LAB
BASOPHILS # BLD AUTO: 0.01 10*3/MM3 (ref 0–0.2)
BASOPHILS NFR BLD AUTO: 0.2 % (ref 0–1.5)
DEPRECATED RDW RBC AUTO: 44.1 FL (ref 37–54)
EOSINOPHIL # BLD AUTO: 0.09 10*3/MM3 (ref 0–0.7)
EOSINOPHIL NFR BLD AUTO: 2.2 % (ref 0.3–6.2)
ERYTHROCYTE [DISTWIDTH] IN BLOOD BY AUTOMATED COUNT: 13.7 % (ref 11.5–14.5)
FERRITIN SERPL-MCNC: 72.2 NG/ML (ref 30–400)
HCT VFR BLD AUTO: 42.4 % (ref 40.4–52.2)
HGB BLD-MCNC: 14.9 G/DL (ref 13.7–17.6)
IMM GRANULOCYTES # BLD: 0 10*3/MM3 (ref 0–0.03)
IMM GRANULOCYTES NFR BLD: 0 % (ref 0–0.5)
LYMPHOCYTES # BLD AUTO: 1.45 10*3/MM3 (ref 0.9–4.8)
LYMPHOCYTES NFR BLD AUTO: 35.5 % (ref 19.6–45.3)
MCH RBC QN AUTO: 30.8 PG (ref 27–32.7)
MCHC RBC AUTO-ENTMCNC: 35.1 G/DL (ref 32.6–36.4)
MCV RBC AUTO: 87.6 FL (ref 79.8–96.2)
MONOCYTES # BLD AUTO: 0.28 10*3/MM3 (ref 0.2–1.2)
MONOCYTES NFR BLD AUTO: 6.8 % (ref 5–12)
NEUTROPHILS # BLD AUTO: 2.26 10*3/MM3 (ref 1.9–8.1)
NEUTROPHILS NFR BLD AUTO: 55.3 % (ref 42.7–76)
NRBC BLD MANUAL-RTO: 0 /100 WBC (ref 0–0)
PLATELET # BLD AUTO: 158 10*3/MM3 (ref 140–500)
PMV BLD AUTO: 10.3 FL (ref 6–12)
RBC # BLD AUTO: 4.84 10*6/MM3 (ref 4.6–6)
WBC NRBC COR # BLD: 4.09 10*3/MM3 (ref 4.5–10.7)

## 2018-10-18 PROCEDURE — 85025 COMPLETE CBC W/AUTO DIFF WBC: CPT | Performed by: INTERNAL MEDICINE

## 2018-10-18 PROCEDURE — 82728 ASSAY OF FERRITIN: CPT | Performed by: INTERNAL MEDICINE

## 2018-10-18 PROCEDURE — 36415 COLL VENOUS BLD VENIPUNCTURE: CPT

## 2018-10-18 PROCEDURE — 99195 PHLEBOTOMY: CPT | Performed by: NURSE PRACTITIONER

## 2018-10-18 RX ORDER — SODIUM CHLORIDE 9 MG/ML
250 INJECTION, SOLUTION INTRAVENOUS ONCE
Status: CANCELLED | OUTPATIENT
Start: 2018-10-18

## 2018-11-02 ENCOUNTER — OFFICE VISIT (OUTPATIENT)
Dept: INTERNAL MEDICINE | Facility: CLINIC | Age: 48
End: 2018-11-02

## 2018-11-02 VITALS
DIASTOLIC BLOOD PRESSURE: 82 MMHG | BODY MASS INDEX: 30.38 KG/M2 | OXYGEN SATURATION: 99 % | HEIGHT: 71 IN | WEIGHT: 217 LBS | SYSTOLIC BLOOD PRESSURE: 138 MMHG | HEART RATE: 75 BPM | TEMPERATURE: 98.5 F

## 2018-11-02 DIAGNOSIS — R10.30 LOWER ABDOMINAL PAIN: ICD-10-CM

## 2018-11-02 DIAGNOSIS — H69.83 EUSTACHIAN TUBE DYSFUNCTION, BILATERAL: ICD-10-CM

## 2018-11-02 DIAGNOSIS — K57.30 DIVERTICULOSIS OF LARGE INTESTINE WITHOUT HEMORRHAGE: ICD-10-CM

## 2018-11-02 DIAGNOSIS — Z00.00 HEALTHCARE MAINTENANCE: ICD-10-CM

## 2018-11-02 DIAGNOSIS — N41.0 ACUTE PROSTATITIS: Primary | ICD-10-CM

## 2018-11-02 PROCEDURE — 90674 CCIIV4 VAC NO PRSV 0.5 ML IM: CPT | Performed by: INTERNAL MEDICINE

## 2018-11-02 PROCEDURE — 99214 OFFICE O/P EST MOD 30 MIN: CPT | Performed by: INTERNAL MEDICINE

## 2018-11-02 PROCEDURE — 90471 IMMUNIZATION ADMIN: CPT | Performed by: INTERNAL MEDICINE

## 2018-11-02 RX ORDER — METRONIDAZOLE 500 MG/1
500 TABLET ORAL 3 TIMES DAILY
Qty: 30 TABLET | Refills: 0 | Status: SHIPPED | OUTPATIENT
Start: 2018-11-02 | End: 2018-11-12

## 2018-11-02 RX ORDER — SOD CHLOR,BICARB/SQUEEZ BOTTLE
1 PACKET, WITH RINSE DEVICE NASAL 3 TIMES DAILY
Qty: 1 EACH | Refills: 0
Start: 2018-11-02 | End: 2019-06-04

## 2018-11-02 RX ORDER — FLUTICASONE PROPIONATE 50 MCG
2 SPRAY, SUSPENSION (ML) NASAL DAILY
Start: 2018-11-02 | End: 2018-12-02

## 2018-11-02 NOTE — PROGRESS NOTES
"Follow-up; Prostate Check; and Ear Fullness (left ear )      HPI  Matias Hughes is a 48 y.o. male RTC in acute care: Notes is doing better. Is on week 3 of 4 or Cipro. NOtes overall sx are \"a lot better\". Has rare twinge in pelvis now, but largely resolved.   No dysuria.  No blood in urine. NO tendon issues noted since on Cipro.   Feels like doing well overall.   chronic prostatitis last treated in ~2010 RTC In acute care with some lower abdominal pain, dysuria, and mild L flank pain. Sx have been brewing over last few weeks, but seem to have accelerated with urinary frequency and dysuria in last few days. Exam reveals some mild, diffuse lower abdominal tenderness and a boggy, tender prostate on exam. I suspect pt is dealing with recurrent prostatitis based on exam and will start pt back on Cipro course for next 28 days for control of sx. Side effects reviewed with pt including risk of tendon damage.  Will check CBC today and BMP while in office. Will RTC in 4 weeks for re-eval.     Notes acute issue today. Has some L ear fullness in last 1-2 weeks.  NO ear pain.  Mild sinus congestion. No sore throat. No hearing loss.  No fevers noted at all. Does not feel sick.  Feels like ear just feels full and will swallow some congestion \"every once in a while\". L >> R.  Saw \"[facility] and told did not see anything.  Given Clindamycin and Prednisone.  Did not take Clindamycin.  Feels like pressure in ear better after Prednisone Rx.  Started Zyrtec about 10 days ago. No nasal rinsing.  No flonase tried at all.         Review of Systems   Constitution: Negative for chills, fever and malaise/fatigue.   HENT: Positive for congestion (mild sinus congestion). Negative for ear discharge, ear pain, hearing loss, hoarse voice and sore throat.         Ear fullness noted     Respiratory: Negative for cough and shortness of breath.    Musculoskeletal: Negative for muscle weakness and myalgias.   Gastrointestinal: Negative for " "change in bowel habit, constipation, diarrhea, nausea and vomiting.   Genitourinary: Positive for pelvic pain (mild, very minimal twinge occasionally. ). Negative for dysuria, frequency, hematuria and incomplete emptying.       The following portions of the patient's history were reviewed and updated as appropriate: allergies, current medications, past medical history and problem list.      Current Outpatient Prescriptions:   •  cetirizine (zyrTEC) 10 MG tablet, Take 10 mg by mouth Daily., Disp: , Rfl:   •  ciprofloxacin (CIPRO) 500 MG tablet, Take 1 tablet by mouth Every 12 (Twelve) Hours for 28 days., Disp: 56 tablet, Rfl: 0  •  omeprazole (priLOSEC) 20 MG capsule, Take 20 mg by mouth., Disp: , Rfl:   •  fluticasone (FLONASE) 50 MCG/ACT nasal spray, 2 sprays into the nostril(s) as directed by provider Daily for 30 days. Administer 2 sprays in each nostril daily, Disp: , Rfl:   •  Hypertonic Nasal Wash (SINUS RINSE BOTTLE KIT) pack, 1 bottle into the nostril(s) as directed by provider 3 (Three) Times a Day., Disp: 1 each, Rfl: 0  •  metroNIDAZOLE (FLAGYL) 500 MG tablet, Take 1 tablet by mouth 3 (Three) Times a Day for 10 days., Disp: 30 tablet, Rfl: 0    Vitals:    11/02/18 0849   BP: 138/82   Pulse: 75   Temp: 98.5 °F (36.9 °C)   SpO2: 99%   Weight: 98.4 kg (217 lb)   Height: 180.3 cm (70.98\")         Physical Exam   Constitutional: He is oriented to person, place, and time. He appears well-developed and well-nourished. He does not have a sickly appearance. He does not appear ill. No distress.   HENT:   Head: Normocephalic and atraumatic.   Right Ear: Tympanic membrane, external ear and ear canal normal.   Left Ear: Tympanic membrane, external ear and ear canal normal.   Nose: No mucosal edema or rhinorrhea.   Mouth/Throat: Uvula is midline. Mucous membranes are not pale, not dry and not cyanotic. No oral lesions. No oropharyngeal exudate, posterior oropharyngeal edema or posterior oropharyngeal erythema.   Eyes: " Pupils are equal, round, and reactive to light. Conjunctivae are normal. Right eye exhibits no discharge. Left eye exhibits no discharge.   Neck: Normal range of motion. Neck supple.   Cardiovascular: Normal rate and regular rhythm.    Pulmonary/Chest: Effort normal and breath sounds normal. No respiratory distress. He has no wheezes. He has no rales.   Abdominal: Soft. Bowel sounds are normal. He exhibits no distension and no mass. There is tenderness (focal, mild with deep palpation in LLQ) in the left lower quadrant. There is no rigidity, no rebound, no guarding and no CVA tenderness.   Genitourinary: Rectal exam shows anal tone normal. Prostate is not enlarged (not boggy) and not tender.   Lymphadenopathy:     He has no cervical adenopathy.   Neurological: He is alert and oriented to person, place, and time. No cranial nerve deficit.   Psychiatric: He has a normal mood and affect. His behavior is normal.   Vitals reviewed.      Assessment/ Plan  Diagnoses and all orders for this visit:    Acute prostatitis    Lower abdominal pain  -     metroNIDAZOLE (FLAGYL) 500 MG tablet; Take 1 tablet by mouth 3 (Three) Times a Day for 10 days.    Eustachian tube dysfunction, bilateral  -     Hypertonic Nasal Wash (SINUS RINSE BOTTLE KIT) pack; 1 bottle into the nostril(s) as directed by provider 3 (Three) Times a Day.  -     fluticasone (FLONASE) 50 MCG/ACT nasal spray; 2 sprays into the nostril(s) as directed by provider Daily for 30 days. Administer 2 sprays in each nostril daily    Healthcare maintenance  -     Flucelvax Quad=>4Years (PFS)    Diverticulosis of large intestine without hemorrhage  -     metroNIDAZOLE (FLAGYL) 500 MG tablet; Take 1 tablet by mouth 3 (Three) Times a Day for 10 days.        Return in about 3 weeks (around 11/23/2018).      Discussion:  Matias SIMÓN Hughes is a 48 y.o. male RTC in short interval f/u after recent visit for recurrent prostatitis. Pt has completed 3/ 4 weeks of Cipro and is feeling  better. Exam notable for non-boggy, non tender prostate today.  Pt has hx of C diff in past and has had no diarrhea events noted.  However, on exam today, pt still has some LLQ pain (TTP) without rebound. Note pt had similar lower abdominal pain in 2017 with 6/2017 CT showing nothing acute and subsequent C-scope that showed only diverticulosis.  I am unclear if this pain has been present for last year or only isolated to current event.  I d/w pt option of adding metronidazole to Cipro course to cover for any potential diverticuli and pt agrees to complete once back from [facility] (leaves tomorrow).  I would like to see pt back in 3 weeks to assess if pain is gone.  May need to f/u with GI for additional eval if pain persists vs. Repeat CT A/P.  Eustachian tube dysfunction - noted B for last 1-2 weeks. I highly suspect viral vs. Allergic etiology and I advised pt to start Flonase nasal and saline rinse. I would have preferred pt not been on oral steroids, but has completed and sx were some better. I advised pt NOT to take clindamycin Rx from [facility] on top of current Cipro in #1, particularly in light of C. Diff hx.  Pt agreeable. Call if sx are not better/ monitor [place] and [place] allergy pattern.   HM - flu shot today.     RTC 3 weeks.

## 2018-11-27 ENCOUNTER — OFFICE VISIT (OUTPATIENT)
Dept: INTERNAL MEDICINE | Facility: CLINIC | Age: 48
End: 2018-11-27

## 2018-11-27 VITALS
HEIGHT: 71 IN | RESPIRATION RATE: 16 BRPM | HEART RATE: 90 BPM | OXYGEN SATURATION: 98 % | SYSTOLIC BLOOD PRESSURE: 132 MMHG | WEIGHT: 219 LBS | DIASTOLIC BLOOD PRESSURE: 76 MMHG | BODY MASS INDEX: 30.66 KG/M2

## 2018-11-27 DIAGNOSIS — K57.30 DIVERTICULOSIS OF LARGE INTESTINE WITHOUT HEMORRHAGE: ICD-10-CM

## 2018-11-27 DIAGNOSIS — K27.9 PEPTIC ULCER DISEASE: ICD-10-CM

## 2018-11-27 DIAGNOSIS — K21.00 REFLUX ESOPHAGITIS: ICD-10-CM

## 2018-11-27 DIAGNOSIS — N41.0 ACUTE PROSTATITIS: Primary | ICD-10-CM

## 2018-11-27 DIAGNOSIS — R10.30 LOWER ABDOMINAL PAIN: ICD-10-CM

## 2018-11-27 PROCEDURE — 99213 OFFICE O/P EST LOW 20 MIN: CPT | Performed by: INTERNAL MEDICINE

## 2018-11-27 NOTE — PROGRESS NOTES
"Follow-up (3 weeks )      HPI  Matias Hughes is a 48 y.o. male RTC In short interval f/u: Has been doing well.  Was in [facility] and had a good time.  Feels like pain in LLQ is \"about hte same\".  Completed all Abx with Cipro and Flagyl.  However, notes that pain from early October is really gone, pain that was from prostatitis.  \"I dont have any pain like that now\".    Feels like current mild pain is really a different pain than had in October with tx for prostatitis. Feels like overall doing well, feels well.    Had EGD 2 weeks ago with Dr. Meehan and told to continue on omeprazole.   This was follow-up for esophagitis, told did not have Denis's.  There is some confusion on this issues.  EGD last week was stable and told needed to come back in 3 years.     Had some itching last week, day after Thanksgiving.  Had just finished Cipro and Flagyl a few days prior to sx. Had two days of itching only and then resolved after some Benadryl.       has completed 3/ 4 weeks of Cipro and is feeling better. Exam notable for non-boggy, non tender prostate today.  Pt has hx of C diff in past and has had no diarrhea events noted.  However, on exam today, pt still has some LLQ pain (TTP) without rebound. Note pt had similar lower abdominal pain in 2017 with 6/2017 CT showing nothing acute and subsequent C-scope that showed only diverticulosis.  I am unclear if this pain has been present for last year or only isolated to current event.  I d/w pt option of adding metronidazole to Cipro course to cover for any potential diverticuli and pt agrees to complete once back from [facility] (leaves tomorrow).  I would like to see pt back in 3 weeks to assess if pain is gone.  May need to f/u with GI for additional eval if pain persists vs. Repeat CT A/P.  Eustachian tube dysfunction - noted B for last 1-2 weeks. I highly suspect viral vs. Allergic etiology and I advised pt to start Flonase nasal and saline rinse. I would have preferred pt " "not been on oral steroids, but has completed and sx were some better. I advised pt NOT to take clindamycin Rx from [facility] on top of current Cipro in #1, particularly in light of C. Diff hx.  Pt agreeable. Call if sx are not better/ monitor as [place] and current allergy pattern.   HM - flu shot today.     Review of Systems   Constitution: Negative for chills and fever.   Skin: Positive for itching (last week for 2 days, resolved. ). Negative for rash.   Gastrointestinal: Positive for abdominal pain (notes mild, intermittent, \"Feels muscular\"). Negative for diarrhea, nausea and vomiting.   Genitourinary: Negative for dysuria, frequency and hematuria.       The following portions of the patient's history were reviewed and updated as appropriate: allergies, current medications, past medical history and problem list.      Current Outpatient Medications:   •  omeprazole (priLOSEC) 20 MG capsule, Take 20 mg by mouth., Disp: , Rfl:   •  cetirizine (zyrTEC) 10 MG tablet, Take 10 mg by mouth Daily., Disp: , Rfl:   •  fluticasone (FLONASE) 50 MCG/ACT nasal spray, 2 sprays into the nostril(s) as directed by provider Daily for 30 days. Administer 2 sprays in each nostril daily, Disp: , Rfl:   •  Hypertonic Nasal Wash (SINUS RINSE BOTTLE KIT) pack, 1 bottle into the nostril(s) as directed by provider 3 (Three) Times a Day., Disp: 1 each, Rfl: 0    Vitals:    11/27/18 1437   BP: 132/76   BP Location: Right arm   Patient Position: Sitting   Cuff Size: Adult   Pulse: 90   Resp: 16   SpO2: 98%   Weight: 99.3 kg (219 lb)   Height: 180.3 cm (71\")         Physical Exam   Constitutional: He is oriented to person, place, and time. He appears well-developed and well-nourished. No distress.   HENT:   Head: Normocephalic and atraumatic.   Neck: Normal range of motion. Neck supple. Carotid bruit is not present.   Cardiovascular: Normal rate, regular rhythm and normal heart sounds.   Pulses:       Carotid pulses are 2+ on the right " "side, and 2+ on the left side.       Radial pulses are 2+ on the right side, and 2+ on the left side.   Pulmonary/Chest: Effort normal and breath sounds normal. No respiratory distress. He has no wheezes. He has no rales.   Abdominal: Soft. Bowel sounds are normal. He exhibits no distension and no mass. There is no tenderness. There is no rebound and no guarding.   No TTP in LLQ.     Neurological: He is alert and oriented to person, place, and time. No cranial nerve deficit.   Skin: No rash noted.   Psychiatric: He has a normal mood and affect. His behavior is normal.   Vitals reviewed.      Assessment/ Plan  Diagnoses and all orders for this visit:    Acute prostatitis    Diverticulosis of large intestine without hemorrhage    Lower abdominal pain    Peptic ulcer disease    Reflux esophagitis        Return in about 7 months (around 6/27/2019) for Annual physical.      Discussion:  Matias Hughes is a 48 y.o. male RTC in short interval f/u after recent event of actue prostatitis s/p 4 weeks Cipro in 10/2018-11/2018. Pt had some LLQ pain on exam on return to office in 11/2/18 and Flagyl added to cover for any diverticular disease, imaging held as had CT A/P in 2017.  Pt is back to today after trip to [facility] and feels well. Notes some \"muscular\" pain in abdomen at times, but no pain in abdomen on exam. Feels well. Bowels are OK (no diarrhea to suggest C. Diff return).  Reassuring and will have pt c/w current course, no additional Abx or eval.   Esophagitis without intestinal metaplasia on 12/2017 EGD - had repeat 2 weeks ago and told needed f/u in 3 years. Unclear if path showed Denis's. Pt unclear. Will obtain records from Dr. Meehan for review. Regardless, will c/w PPI daily at this time.     RTC 7 months CPE, F labs prior    "

## 2019-01-24 ENCOUNTER — APPOINTMENT (OUTPATIENT)
Dept: LAB | Facility: HOSPITAL | Age: 49
End: 2019-01-24

## 2019-01-24 DIAGNOSIS — E83.110 HEREDITARY HEMOCHROMATOSIS (HCC): Primary | ICD-10-CM

## 2019-01-24 LAB
BASOPHILS # BLD AUTO: 0.01 10*3/MM3 (ref 0–0.1)
BASOPHILS NFR BLD AUTO: 0.2 % (ref 0–1.1)
DEPRECATED RDW RBC AUTO: 42.8 FL (ref 37–49)
EOSINOPHIL # BLD AUTO: 0.05 10*3/MM3 (ref 0–0.36)
EOSINOPHIL NFR BLD AUTO: 1 % (ref 1–5)
ERYTHROCYTE [DISTWIDTH] IN BLOOD BY AUTOMATED COUNT: 13.3 % (ref 11.7–14.5)
FERRITIN SERPL-MCNC: 74 NG/ML (ref 30–400)
HCT VFR BLD AUTO: 43 % (ref 40–49)
HGB BLD-MCNC: 14.9 G/DL (ref 13.5–16.5)
IMM GRANULOCYTES # BLD AUTO: 0.01 10*3/MM3 (ref 0–0.03)
IMM GRANULOCYTES NFR BLD AUTO: 0.2 % (ref 0–0.5)
LYMPHOCYTES # BLD AUTO: 1.62 10*3/MM3 (ref 1–3.5)
LYMPHOCYTES NFR BLD AUTO: 33.2 % (ref 20–49)
MCH RBC QN AUTO: 30.5 PG (ref 27–33)
MCHC RBC AUTO-ENTMCNC: 34.7 G/DL (ref 32–35)
MCV RBC AUTO: 87.9 FL (ref 83–97)
MONOCYTES # BLD AUTO: 0.29 10*3/MM3 (ref 0.25–0.8)
MONOCYTES NFR BLD AUTO: 5.9 % (ref 4–12)
NEUTROPHILS # BLD AUTO: 2.9 10*3/MM3 (ref 1.5–7)
NEUTROPHILS NFR BLD AUTO: 59.5 % (ref 39–75)
NRBC BLD AUTO-RTO: 0 /100 WBC (ref 0–0)
PLATELET # BLD AUTO: 152 10*3/MM3 (ref 150–375)
PMV BLD AUTO: 9.2 FL (ref 8.9–12.1)
RBC # BLD AUTO: 4.89 10*6/MM3 (ref 4.3–5.5)
WBC NRBC COR # BLD: 4.88 10*3/MM3 (ref 4–10)

## 2019-01-24 PROCEDURE — 85025 COMPLETE CBC W/AUTO DIFF WBC: CPT | Performed by: INTERNAL MEDICINE

## 2019-01-24 PROCEDURE — 82728 ASSAY OF FERRITIN: CPT | Performed by: INTERNAL MEDICINE

## 2019-01-24 PROCEDURE — 36415 COLL VENOUS BLD VENIPUNCTURE: CPT | Performed by: INTERNAL MEDICINE

## 2019-01-28 DIAGNOSIS — E83.110 HEREDITARY HEMOCHROMATOSIS (HCC): Primary | ICD-10-CM

## 2019-01-31 ENCOUNTER — INFUSION (OUTPATIENT)
Dept: ONCOLOGY | Facility: HOSPITAL | Age: 49
End: 2019-01-31

## 2019-01-31 ENCOUNTER — OFFICE VISIT (OUTPATIENT)
Dept: ONCOLOGY | Facility: CLINIC | Age: 49
End: 2019-01-31

## 2019-01-31 ENCOUNTER — APPOINTMENT (OUTPATIENT)
Dept: OTHER | Facility: HOSPITAL | Age: 49
End: 2019-01-31

## 2019-01-31 VITALS
DIASTOLIC BLOOD PRESSURE: 101 MMHG | TEMPERATURE: 98.1 F | HEART RATE: 79 BPM | SYSTOLIC BLOOD PRESSURE: 152 MMHG | HEIGHT: 71 IN | OXYGEN SATURATION: 99 % | WEIGHT: 229.1 LBS | RESPIRATION RATE: 18 BRPM | BODY MASS INDEX: 32.07 KG/M2

## 2019-01-31 DIAGNOSIS — E83.110 HEREDITARY HEMOCHROMATOSIS (HCC): Primary | ICD-10-CM

## 2019-01-31 LAB
ALBUMIN SERPL-MCNC: 4.6 G/DL (ref 3.5–5.2)
ALBUMIN/GLOB SERPL: 1.6 G/DL
ALP SERPL-CCNC: 56 U/L (ref 39–117)
ALT SERPL W P-5'-P-CCNC: 36 U/L (ref 1–41)
ANION GAP SERPL CALCULATED.3IONS-SCNC: 12.4 MMOL/L
AST SERPL-CCNC: 21 U/L (ref 1–40)
BILIRUB SERPL-MCNC: 0.6 MG/DL (ref 0.1–1.2)
BUN BLD-MCNC: 13 MG/DL (ref 6–20)
BUN/CREAT SERPL: 14.3 (ref 7–25)
CALCIUM SPEC-SCNC: 8.9 MG/DL (ref 8.6–10.5)
CHLORIDE SERPL-SCNC: 101 MMOL/L (ref 98–107)
CO2 SERPL-SCNC: 26.6 MMOL/L (ref 22–29)
CREAT BLD-MCNC: 0.91 MG/DL (ref 0.76–1.27)
GFR SERPL CREATININE-BSD FRML MDRD: 88 ML/MIN/1.73
GLOBULIN UR ELPH-MCNC: 2.8 GM/DL
GLUCOSE BLD-MCNC: 98 MG/DL (ref 65–99)
POTASSIUM BLD-SCNC: 3.9 MMOL/L (ref 3.5–5.2)
PROT SERPL-MCNC: 7.4 G/DL (ref 6–8.5)
SODIUM BLD-SCNC: 140 MMOL/L (ref 136–145)

## 2019-01-31 PROCEDURE — 99195 PHLEBOTOMY: CPT | Performed by: INTERNAL MEDICINE

## 2019-01-31 PROCEDURE — 99214 OFFICE O/P EST MOD 30 MIN: CPT | Performed by: INTERNAL MEDICINE

## 2019-01-31 PROCEDURE — 80053 COMPREHEN METABOLIC PANEL: CPT | Performed by: INTERNAL MEDICINE

## 2019-01-31 RX ORDER — SODIUM CHLORIDE 9 MG/ML
250 INJECTION, SOLUTION INTRAVENOUS ONCE
Status: CANCELLED | OUTPATIENT
Start: 2019-01-31

## 2019-04-25 ENCOUNTER — LAB (OUTPATIENT)
Dept: OTHER | Facility: HOSPITAL | Age: 49
End: 2019-04-25

## 2019-04-25 ENCOUNTER — INFUSION (OUTPATIENT)
Dept: ONCOLOGY | Facility: HOSPITAL | Age: 49
End: 2019-04-25

## 2019-04-25 VITALS
RESPIRATION RATE: 16 BRPM | HEART RATE: 77 BPM | BODY MASS INDEX: 32.17 KG/M2 | SYSTOLIC BLOOD PRESSURE: 133 MMHG | HEIGHT: 71 IN | OXYGEN SATURATION: 98 % | DIASTOLIC BLOOD PRESSURE: 98 MMHG | TEMPERATURE: 97.5 F | WEIGHT: 229.8 LBS

## 2019-04-25 DIAGNOSIS — E83.110 HEREDITARY HEMOCHROMATOSIS (HCC): Primary | ICD-10-CM

## 2019-04-25 DIAGNOSIS — E83.110 HEREDITARY HEMOCHROMATOSIS (HCC): ICD-10-CM

## 2019-04-25 LAB
BASOPHILS # BLD AUTO: 0.01 10*3/MM3 (ref 0–0.2)
BASOPHILS NFR BLD AUTO: 0.2 % (ref 0–1.5)
DEPRECATED RDW RBC AUTO: 44 FL (ref 37–54)
EOSINOPHIL # BLD AUTO: 0.09 10*3/MM3 (ref 0–0.4)
EOSINOPHIL NFR BLD AUTO: 2.1 % (ref 0.3–6.2)
ERYTHROCYTE [DISTWIDTH] IN BLOOD BY AUTOMATED COUNT: 13.5 % (ref 12.3–15.4)
FERRITIN SERPL-MCNC: 68 NG/ML (ref 30–400)
HCT VFR BLD AUTO: 42.6 % (ref 37.5–51)
HGB BLD-MCNC: 14.5 G/DL (ref 13–17.7)
IMM GRANULOCYTES # BLD AUTO: 0.02 10*3/MM3 (ref 0–0.05)
IMM GRANULOCYTES NFR BLD AUTO: 0.5 % (ref 0–0.5)
LYMPHOCYTES # BLD AUTO: 1.57 10*3/MM3 (ref 0.7–3.1)
LYMPHOCYTES NFR BLD AUTO: 37.3 % (ref 19.6–45.3)
MCH RBC QN AUTO: 30.2 PG (ref 26.6–33)
MCHC RBC AUTO-ENTMCNC: 34 G/DL (ref 31.5–35.7)
MCV RBC AUTO: 88.8 FL (ref 79–97)
MONOCYTES # BLD AUTO: 0.29 10*3/MM3 (ref 0.1–0.9)
MONOCYTES NFR BLD AUTO: 6.9 % (ref 5–12)
NEUTROPHILS # BLD AUTO: 2.23 10*3/MM3 (ref 1.7–7)
NEUTROPHILS NFR BLD AUTO: 53 % (ref 42.7–76)
NRBC BLD AUTO-RTO: 0 /100 WBC (ref 0–0.2)
PLATELET # BLD AUTO: 161 10*3/MM3 (ref 140–450)
PMV BLD AUTO: 9.6 FL (ref 6–12)
RBC # BLD AUTO: 4.8 10*6/MM3 (ref 4.14–5.8)
WBC NRBC COR # BLD: 4.21 10*3/MM3 (ref 3.4–10.8)

## 2019-04-25 PROCEDURE — 36415 COLL VENOUS BLD VENIPUNCTURE: CPT

## 2019-04-25 PROCEDURE — 99195 PHLEBOTOMY: CPT | Performed by: INTERNAL MEDICINE

## 2019-04-25 PROCEDURE — 82728 ASSAY OF FERRITIN: CPT | Performed by: INTERNAL MEDICINE

## 2019-04-25 PROCEDURE — 85025 COMPLETE CBC W/AUTO DIFF WBC: CPT | Performed by: INTERNAL MEDICINE

## 2019-04-25 RX ORDER — SODIUM CHLORIDE 9 MG/ML
250 INJECTION, SOLUTION INTRAVENOUS ONCE
Status: CANCELLED | OUTPATIENT
Start: 2019-04-25

## 2019-06-04 ENCOUNTER — OFFICE VISIT (OUTPATIENT)
Dept: INTERNAL MEDICINE | Facility: CLINIC | Age: 49
End: 2019-06-04

## 2019-06-04 VITALS
HEART RATE: 97 BPM | DIASTOLIC BLOOD PRESSURE: 90 MMHG | BODY MASS INDEX: 32.07 KG/M2 | HEIGHT: 70 IN | WEIGHT: 224 LBS | TEMPERATURE: 98.8 F | OXYGEN SATURATION: 98 % | SYSTOLIC BLOOD PRESSURE: 132 MMHG

## 2019-06-04 DIAGNOSIS — R05.9 COUGH: ICD-10-CM

## 2019-06-04 DIAGNOSIS — J01.90 ACUTE SINUSITIS, RECURRENCE NOT SPECIFIED, UNSPECIFIED LOCATION: ICD-10-CM

## 2019-06-04 DIAGNOSIS — J20.9 ACUTE BRONCHITIS, UNSPECIFIED ORGANISM: Primary | ICD-10-CM

## 2019-06-04 DIAGNOSIS — J02.9 ACUTE PHARYNGITIS, UNSPECIFIED ETIOLOGY: ICD-10-CM

## 2019-06-04 PROCEDURE — 99214 OFFICE O/P EST MOD 30 MIN: CPT | Performed by: INTERNAL MEDICINE

## 2019-06-04 RX ORDER — SOD CHLOR,BICARB/SQUEEZ BOTTLE
1 PACKET, WITH RINSE DEVICE NASAL 3 TIMES DAILY
Qty: 1 EACH | Refills: 0
Start: 2019-06-04 | End: 2019-07-23

## 2019-06-04 RX ORDER — GUAIFENESIN AND DEXTROMETHORPHAN HYDROBROMIDE 1200; 60 MG/1; MG/1
TABLET, EXTENDED RELEASE ORAL
Qty: 28 EACH | Refills: 0
Start: 2019-06-04 | End: 2019-07-23

## 2019-06-04 RX ORDER — PROMETHAZINE HYDROCHLORIDE AND CODEINE PHOSPHATE 6.25; 1 MG/5ML; MG/5ML
5 SYRUP ORAL NIGHTLY PRN
Qty: 60 ML | Refills: 0 | Status: SHIPPED | OUTPATIENT
Start: 2019-06-04 | End: 2019-07-23

## 2019-06-04 RX ORDER — IBUPROFEN 200 MG
TABLET ORAL
Qty: 30 TABLET | Refills: 0
Start: 2019-06-04 | End: 2019-07-23

## 2019-06-04 RX ORDER — OMEPRAZOLE 40 MG/1
CAPSULE, DELAYED RELEASE ORAL
COMMUNITY
Start: 2019-05-11 | End: 2021-03-01 | Stop reason: SDUPTHER

## 2019-06-04 NOTE — PROGRESS NOTES
"Cough (congestion, nasal congestion)      HPI  Matias Hughes is a 48 y.o. male RTC In acute care:   \"I have the cough and cold\".  Wife had same thing and was sick a week ago.  Went to [facility] and got a 'fistful of prescriptions\".  She is feeling better. PT notes his sx started about 5 days ago with cough.  Cough is productive of mucous.  Temp to 100.7 last night.  No SOA.  Mild sore throat and nasal congestion.  No HA or body aches.      Meds: Sudaphed for nasal congestion, Mucinex, Nyquil last night was last med taken for this.       Review of Systems   Constitution: Positive for fever and malaise/fatigue. Negative for chills.   HENT: Positive for congestion and nosebleeds (yesterday with blowing nose). Negative for ear discharge and ear pain.    Eyes: Negative for discharge, pain and redness.   Cardiovascular: Negative for dyspnea on exertion.   Respiratory: Positive for cough and sputum production. Negative for shortness of breath and wheezing.    Musculoskeletal: Negative for myalgias.   Gastrointestinal: Negative for diarrhea, nausea and vomiting.       The following portions of the patient's history were reviewed and updated as appropriate: allergies, current medications, past medical history, past social history and problem list.      Current Outpatient Medications:   •  cetirizine (zyrTEC) 10 MG tablet, Take 10 mg by mouth Daily., Disp: , Rfl:   •  Chlorphen-PE-Acetaminophen (CORICIDIN D COLD/FLU/SINUS) 2-5-325 MG tablet, Take 1 tablet by mouth 2 (Two) Times a Day., Disp: , Rfl:   •  Dextromethorphan-guaiFENesin (MUCINEX DM MAXIMUM STRENGTH)  MG tablet sustained-release 12 hour, One PO BID, Disp: 28 each, Rfl: 0  •  Hypertonic Nasal Wash (SINUS RINSE BOTTLE KIT) pack, 1 bottle into the nostril(s) as directed by provider 3 (Three) Times a Day., Disp: 1 each, Rfl: 0  •  ibuprofen (MOTRIN IB) 200 MG tablet, 3 tabs PO q 8 hours, Disp: 30 tablet, Rfl: 0  •  omeprazole (priLOSEC) 40 MG capsule, , Disp: , Rfl: " "  •  promethazine-codeine (PHENERGAN with CODEINE) 6.25-10 MG/5ML syrup, Take 5 mL by mouth At Night As Needed for Cough., Disp: 60 mL, Rfl: 0    Vitals:    06/04/19 1126   BP: 132/90   Pulse: 97   Temp: 98.8 °F (37.1 °C)   SpO2: 98%   Weight: 102 kg (224 lb)   Height: 177.8 cm (70\")         Physical Exam   Constitutional: He is oriented to person, place, and time. He appears well-developed and well-nourished. He does not have a sickly appearance. He does not appear ill. No distress.   HENT:   Head: Normocephalic and atraumatic.   Right Ear: Tympanic membrane, external ear and ear canal normal.   Left Ear: Tympanic membrane, external ear and ear canal normal.   Nose: Mucosal edema and rhinorrhea present. Right sinus exhibits no maxillary sinus tenderness and no frontal sinus tenderness. Left sinus exhibits no maxillary sinus tenderness and no frontal sinus tenderness.   Mouth/Throat: Uvula is midline. Mucous membranes are not pale, not dry and not cyanotic. No oral lesions. No uvula swelling. Posterior oropharyngeal edema (mild) and posterior oropharyngeal erythema (mild) present. No oropharyngeal exudate.   Eyes: Conjunctivae are normal. Pupils are equal, round, and reactive to light. Right eye exhibits no discharge. Left eye exhibits no discharge.   Neck: Normal range of motion. Neck supple.   Cardiovascular: Normal rate and regular rhythm.   Pulmonary/Chest: Effort normal and breath sounds normal. No tachypnea. No respiratory distress. He has no decreased breath sounds. He has no wheezes. He has no rhonchi. He has no rales. Chest wall is not dull to percussion.   No egophony noted     Lymphadenopathy:     He has no cervical adenopathy.   Neurological: He is alert and oriented to person, place, and time. No cranial nerve deficit.   Psychiatric: He has a normal mood and affect. His behavior is normal.   Vitals reviewed.      Assessment/ Plan  Diagnoses and all orders for this visit:    Acute bronchitis, unspecified " organism  -     Dextromethorphan-guaiFENesin (MUCINEX DM MAXIMUM STRENGTH)  MG tablet sustained-release 12 hour; One PO BID  -     ibuprofen (MOTRIN IB) 200 MG tablet; 3 tabs PO q 8 hours  -     promethazine-codeine (PHENERGAN with CODEINE) 6.25-10 MG/5ML syrup; Take 5 mL by mouth At Night As Needed for Cough.    Cough  -     Dextromethorphan-guaiFENesin (MUCINEX DM MAXIMUM STRENGTH)  MG tablet sustained-release 12 hour; One PO BID  -     promethazine-codeine (PHENERGAN with CODEINE) 6.25-10 MG/5ML syrup; Take 5 mL by mouth At Night As Needed for Cough.    Acute sinusitis, recurrence not specified, unspecified location  -     Hypertonic Nasal Wash (SINUS RINSE BOTTLE KIT) pack; 1 bottle into the nostril(s) as directed by provider 3 (Three) Times a Day.  -     Chlorphen-PE-Acetaminophen (CORICIDIN D COLD/FLU/SINUS) 2-5-325 MG tablet; Take 1 tablet by mouth 2 (Two) Times a Day.    Acute pharyngitis, unspecified etiology  -     ibuprofen (MOTRIN IB) 200 MG tablet; 3 tabs PO q 8 hours    Other orders  -     omeprazole (priLOSEC) 40 MG capsule        Return for Next scheduled follow up.      Discussion:  Matias SIMÓN Hughes is a 48 y.o. male RTC In acute care (new issue to [facility]) with ~5-6 days of acute bronchitist/ sinusitis/ pharnygitis sx. Suspect viral etiology, noted family member with similar illness.  Reassured pt today on 10-14 day course of sx. Rec'd meds as noted, aggressive nasal toilet, Rx for codeine syrup at night to improve cough/ sleep. Pt to call or RTC if T> 100.5, SOA, double sickness, or failure to improve.

## 2019-07-16 DIAGNOSIS — R03.0 BORDERLINE BLOOD PRESSURE: ICD-10-CM

## 2019-07-16 DIAGNOSIS — Z00.00 HEALTHCARE MAINTENANCE: Primary | ICD-10-CM

## 2019-07-16 DIAGNOSIS — K76.0 FATTY LIVER: ICD-10-CM

## 2019-07-16 DIAGNOSIS — E78.5 DYSLIPIDEMIA: ICD-10-CM

## 2019-07-16 DIAGNOSIS — E55.9 AVITAMINOSIS D: ICD-10-CM

## 2019-07-16 DIAGNOSIS — E66.9 OBESITY (BMI 30-39.9): ICD-10-CM

## 2019-07-16 DIAGNOSIS — R73.01 IFG (IMPAIRED FASTING GLUCOSE): ICD-10-CM

## 2019-07-16 DIAGNOSIS — E83.110 HEREDITARY HEMOCHROMATOSIS (HCC): ICD-10-CM

## 2019-07-17 DIAGNOSIS — E66.9 OBESITY (BMI 30-39.9): ICD-10-CM

## 2019-07-17 DIAGNOSIS — E83.110 HEREDITARY HEMOCHROMATOSIS (HCC): ICD-10-CM

## 2019-07-17 DIAGNOSIS — E78.5 DYSLIPIDEMIA: ICD-10-CM

## 2019-07-17 DIAGNOSIS — Z00.00 HEALTHCARE MAINTENANCE: Primary | ICD-10-CM

## 2019-07-17 DIAGNOSIS — E55.9 AVITAMINOSIS D: ICD-10-CM

## 2019-07-17 DIAGNOSIS — K76.0 FATTY LIVER: ICD-10-CM

## 2019-07-17 DIAGNOSIS — R73.01 IFG (IMPAIRED FASTING GLUCOSE): ICD-10-CM

## 2019-07-19 ENCOUNTER — TELEPHONE (OUTPATIENT)
Dept: ONCOLOGY | Facility: HOSPITAL | Age: 49
End: 2019-07-19

## 2019-07-19 LAB
25(OH)D3+25(OH)D2 SERPL-MCNC: 27.4 NG/ML (ref 30–100)
ALBUMIN SERPL-MCNC: 4.7 G/DL (ref 3.5–5.2)
ALBUMIN/GLOB SERPL: 1.9 G/DL
ALP SERPL-CCNC: 65 U/L (ref 39–117)
ALT SERPL-CCNC: 41 U/L (ref 1–41)
APPEARANCE UR: CLEAR
AST SERPL-CCNC: 23 U/L (ref 1–40)
BACTERIA #/AREA URNS HPF: NORMAL /HPF
BASOPHILS # BLD AUTO: 0.02 10*3/MM3 (ref 0–0.2)
BASOPHILS NFR BLD AUTO: 0.5 % (ref 0–1.5)
BILIRUB SERPL-MCNC: 0.7 MG/DL (ref 0.2–1.2)
BILIRUB UR QL STRIP: NEGATIVE
BUN SERPL-MCNC: 13 MG/DL (ref 6–20)
BUN/CREAT SERPL: 14.1 (ref 7–25)
CALCIUM SERPL-MCNC: 9 MG/DL (ref 8.6–10.5)
CHLORIDE SERPL-SCNC: 102 MMOL/L (ref 98–107)
CHOLEST SERPL-MCNC: 145 MG/DL (ref 0–200)
CO2 SERPL-SCNC: 26.6 MMOL/L (ref 22–29)
COLOR UR: YELLOW
CREAT SERPL-MCNC: 0.92 MG/DL (ref 0.76–1.27)
EOSINOPHIL # BLD AUTO: 0.13 10*3/MM3 (ref 0–0.4)
EOSINOPHIL NFR BLD AUTO: 3.4 % (ref 0.3–6.2)
EPI CELLS #/AREA URNS HPF: NORMAL /HPF
ERYTHROCYTE [DISTWIDTH] IN BLOOD BY AUTOMATED COUNT: 14 % (ref 12.3–15.4)
FERRITIN SERPL-MCNC: 80 NG/ML (ref 30–400)
GLOBULIN SER CALC-MCNC: 2.5 GM/DL
GLUCOSE SERPL-MCNC: 109 MG/DL (ref 65–99)
GLUCOSE UR QL: NEGATIVE
HBA1C MFR BLD: 4.9 % (ref 4.8–5.6)
HCT VFR BLD AUTO: 45.9 % (ref 37.5–51)
HDLC SERPL-MCNC: 39 MG/DL (ref 40–60)
HGB BLD-MCNC: 15.2 G/DL (ref 13–17.7)
HGB UR QL STRIP: NEGATIVE
IMM GRANULOCYTES # BLD AUTO: 0.01 10*3/MM3 (ref 0–0.05)
IMM GRANULOCYTES NFR BLD AUTO: 0.3 % (ref 0–0.5)
KETONES UR QL STRIP: NEGATIVE
LDLC SERPL CALC-MCNC: 79 MG/DL (ref 0–100)
LEUKOCYTE ESTERASE UR QL STRIP: NEGATIVE
LYMPHOCYTES # BLD AUTO: 1.39 10*3/MM3 (ref 0.7–3.1)
LYMPHOCYTES NFR BLD AUTO: 36.3 % (ref 19.6–45.3)
MCH RBC QN AUTO: 30 PG (ref 26.6–33)
MCHC RBC AUTO-ENTMCNC: 33.1 G/DL (ref 31.5–35.7)
MCV RBC AUTO: 90.7 FL (ref 79–97)
MICRO URNS: NORMAL
MICRO URNS: NORMAL
MONOCYTES # BLD AUTO: 0.33 10*3/MM3 (ref 0.1–0.9)
MONOCYTES NFR BLD AUTO: 8.6 % (ref 5–12)
MUCOUS THREADS URNS QL MICRO: PRESENT /HPF
NEUTROPHILS # BLD AUTO: 1.95 10*3/MM3 (ref 1.7–7)
NEUTROPHILS NFR BLD AUTO: 50.9 % (ref 42.7–76)
NITRITE UR QL STRIP: NEGATIVE
NRBC BLD AUTO-RTO: 0 /100 WBC (ref 0–0.2)
PH UR STRIP: 7 [PH] (ref 5–7.5)
PLATELET # BLD AUTO: 172 10*3/MM3 (ref 140–450)
POTASSIUM SERPL-SCNC: 4.3 MMOL/L (ref 3.5–5.2)
PROT SERPL-MCNC: 7.2 G/DL (ref 6–8.5)
PROT UR QL STRIP: NEGATIVE
RBC # BLD AUTO: 5.06 10*6/MM3 (ref 4.14–5.8)
RBC #/AREA URNS HPF: NORMAL /HPF
SODIUM SERPL-SCNC: 141 MMOL/L (ref 136–145)
SP GR UR: 1.01 (ref 1–1.03)
TRIGL SERPL-MCNC: 137 MG/DL (ref 0–150)
TSH SERPL DL<=0.005 MIU/L-ACNC: 1.6 MIU/ML (ref 0.27–4.2)
URINALYSIS REFLEX: NORMAL
UROBILINOGEN UR STRIP-MCNC: 0.2 MG/DL (ref 0.2–1)
VLDLC SERPL CALC-MCNC: 27.4 MG/DL
WBC # BLD AUTO: 3.83 10*3/MM3 (ref 3.4–10.8)
WBC #/AREA URNS HPF: NORMAL /HPF

## 2019-07-19 NOTE — TELEPHONE ENCOUNTER
Pt called and wanted Ferritin results that were collected by his PCP yesterday. His Ferritin was 80 which would indicate he needs a phlebo. Pt said he was scheduled for 7/25. He wont need a lab appointment but he will need to change that date. Message sent to scheduling.

## 2019-07-23 ENCOUNTER — OFFICE VISIT (OUTPATIENT)
Dept: INTERNAL MEDICINE | Facility: CLINIC | Age: 49
End: 2019-07-23

## 2019-07-23 VITALS
DIASTOLIC BLOOD PRESSURE: 90 MMHG | WEIGHT: 231 LBS | RESPIRATION RATE: 12 BRPM | SYSTOLIC BLOOD PRESSURE: 130 MMHG | OXYGEN SATURATION: 98 % | HEIGHT: 70 IN | HEART RATE: 97 BPM | BODY MASS INDEX: 33.07 KG/M2 | TEMPERATURE: 97.9 F

## 2019-07-23 DIAGNOSIS — C44.92 SQUAMOUS CELL CARCINOMA OF SKIN: ICD-10-CM

## 2019-07-23 DIAGNOSIS — K76.0 FATTY LIVER: ICD-10-CM

## 2019-07-23 DIAGNOSIS — E66.9 OBESITY (BMI 30-39.9): ICD-10-CM

## 2019-07-23 DIAGNOSIS — E78.5 DYSLIPIDEMIA: ICD-10-CM

## 2019-07-23 DIAGNOSIS — K57.30 DIVERTICULOSIS OF LARGE INTESTINE WITHOUT HEMORRHAGE: ICD-10-CM

## 2019-07-23 DIAGNOSIS — R73.01 IFG (IMPAIRED FASTING GLUCOSE): ICD-10-CM

## 2019-07-23 DIAGNOSIS — I10 ESSENTIAL HYPERTENSION: ICD-10-CM

## 2019-07-23 DIAGNOSIS — G89.29 UPPER BACK PAIN, CHRONIC: ICD-10-CM

## 2019-07-23 DIAGNOSIS — E83.110 HEREDITARY HEMOCHROMATOSIS (HCC): ICD-10-CM

## 2019-07-23 DIAGNOSIS — M54.9 UPPER BACK PAIN, CHRONIC: ICD-10-CM

## 2019-07-23 DIAGNOSIS — L71.9 ROSACEA: ICD-10-CM

## 2019-07-23 DIAGNOSIS — E55.9 AVITAMINOSIS D: ICD-10-CM

## 2019-07-23 DIAGNOSIS — R19.8 IRREGULAR BOWEL HABITS: ICD-10-CM

## 2019-07-23 DIAGNOSIS — K22.719 BARRETT'S ESOPHAGUS WITH DYSPLASIA: ICD-10-CM

## 2019-07-23 DIAGNOSIS — Z00.00 HEALTHCARE MAINTENANCE: Primary | ICD-10-CM

## 2019-07-23 PROCEDURE — 90632 HEPA VACCINE ADULT IM: CPT | Performed by: INTERNAL MEDICINE

## 2019-07-23 PROCEDURE — 90471 IMMUNIZATION ADMIN: CPT | Performed by: INTERNAL MEDICINE

## 2019-07-23 PROCEDURE — 99396 PREV VISIT EST AGE 40-64: CPT | Performed by: INTERNAL MEDICINE

## 2019-07-23 PROCEDURE — 99212 OFFICE O/P EST SF 10 MIN: CPT | Performed by: INTERNAL MEDICINE

## 2019-07-23 RX ORDER — LOSARTAN POTASSIUM 50 MG/1
50 TABLET ORAL DAILY
Qty: 30 TABLET | Refills: 5 | Status: SHIPPED | OUTPATIENT
Start: 2019-07-23 | End: 2020-02-07 | Stop reason: SDUPTHER

## 2019-07-23 RX ORDER — MELATONIN
1000 DAILY
Qty: 30 TABLET | Refills: 5
Start: 2019-07-23

## 2019-07-23 NOTE — PROGRESS NOTES
"Annual Exam (review of medical issues ); Back Pain (left mid back at night ); and Joint Pain (hands )      HPI  Matias Hughes is a 48 y.o. male RTC in yearly CPE, review of medical issues: Has been doing well. Bronchitis has resolved at this time.   1. Squamous Cell CA of skin ~2014 - s/p excision with derm, sees derm q 6 months. No new lesions.    2. Hemachromatosis - phlebotomy ~ 4x/ year with goal ferritin < 50 per Dr. Noonan. Saw Dr. Noonan 1/2019 and has phlebotomy next week.   3. Hx of chronic prostatitis - notes it 'bothers me from time to time' but will fade away on its own. No major issues since treated here.  Does not see urology, but saw in past. NAGI benign today, U/A clear.   4. Rosacea - off minocycline for years now. Rare flare from time to time and often will not tx it. Has some creams from derm for PRN use.    5. Vitamin D deficiency - off 1000 I.U. Vitamin D3 OTC daily.   6. Dyslipidemia, low HDL - diet and exericse \"probably need to get back on my exercise routine\".  Feels like was \"on downward trend last year but on an upward trend this past year in terms of weight.  Knows has gained weight. No exercise right now.   7. Reflux esophagitis - Esophagitis without intestinal metaplasia on 12/2017 EGD - had repeat 11/2018 and told to come back in 3 years. No heartburn issues now. Swallowing OK.    8. Pre-HTN - noted in past. Pt is not tracking at home.  Is gaining weight.  Is not sure what numbers should be. Asks what goal is.       Review of Systems   Constitution: Positive for weight gain. Negative for chills, fever and malaise/fatigue.   HENT: Negative for congestion, hoarse voice, odynophagia and sore throat.    Eyes: Negative for discharge, double vision, pain and redness.        Last eye exam ~8/2018; wears contacts     Cardiovascular: Negative for chest pain, dyspnea on exertion, irregular heartbeat, leg swelling, near-syncope, palpitations and syncope.   Respiratory: Negative for cough and " shortness of breath.    Endocrine: Negative for polydipsia, polyphagia and polyuria.   Hematologic/Lymphatic: Negative for bleeding problem. Does not bruise/bleed easily.   Skin: Negative for rash and suspicious lesions.   Musculoskeletal: Positive for back pain (stiff in upper back), joint pain (in hands, B index MCP stiffness.  In last 5-6 months. ) and stiffness (in hands). Negative for joint swelling, muscle cramps, muscle weakness and myalgias.   Gastrointestinal: Positive for change in bowel habit (with use of PPI). Negative for abdominal pain (variable in LLQ), constipation, diarrhea, dysphagia, heartburn, nausea and vomiting.   Genitourinary: Negative for dysuria, frequency, hematuria and hesitancy.   Neurological: Negative for dizziness, headaches and light-headedness.   Psychiatric/Behavioral: Negative for depression. The patient does not have insomnia and is not nervous/anxious.    Allergic/Immunologic: Negative for environmental allergies and persistent infections.       Problem List:    Patient Active Problem List   Diagnosis   • Hemochromatosis   • Borderline blood pressure   • Squamous cell carcinoma of skin   • Avitaminosis D   • Rosacea   • Dyslipidemia   • IFG (impaired fasting glucose)   • Obesity (BMI 30-39.9)   • Fatty liver   • Ventral hernia without obstruction or gangrene   • Denis's esophagus with dysplasia   • Peptic ulcer disease   • Diverticulosis of large intestine without hemorrhage   • Upper back pain, chronic       Medical History:    Past Medical History:   Diagnosis Date   • Acute prostatitis 10/11/2018    10/2018; on background of chronic prostatitis    • Diverticulosis of large intestine without hemorrhage 11/2/2018   • H/O hypogonadism    • Hemochromatosis     heterozygous for C282Y mutation hx elevated LFT's Ferritin elevated at dx   • History of cholelithiasis    • History of chronic prostatitis     2010 tx'd by Dr Cooper   • History of Clostridium difficile colitis      "Hospitalized June 2013; with SBO   • History of small bowel obstruction     related to adhesions from gallbladder surgery adhesions; 2013; 2015; 2016   • Overweight    • Prehypertension    • Rosacea 6/2/2016   • Squamous cell carcinoma of skin     2012 sees derm q 6 months (Dr. Mitchell)   • Vitamin D deficiency         Social History:    Social History     Socioeconomic History   • Marital status:      Spouse name: Shante   • Number of children: 2   • Years of education: College   • Highest education level: Not on file   Occupational History   • Occupation: [employer]     Employer: [employer]   Tobacco Use   • Smoking status: Never Smoker   • Smokeless tobacco: Never Used   Substance and Sexual Activity   • Alcohol use: Yes     Comment: 3-5 drinks week   • Drug use: No   • Sexual activity: Yes     Partners: Female     Comment: wife only; no hx STD's   Lifestyle   • Physical activity:     Days per week: 0 days     Minutes per session: 0 min   • Stress: Not on file   Social History Narrative    Diet -  \"Could be better\"; too many sweets and red meat    Exercise - volleyball 2x/ week, using FitBit, goes to gym intermittently    Caffeine - 3-4 cups coffee daily       Family History:   Family History   Problem Relation Age of Onset   • Hypothyroidism Mother    • Rheum arthritis Maternal Aunt    • Breast cancer Maternal Aunt    • Glaucoma Paternal Grandmother    • Breast cancer Maternal Grandmother    • Migraines Daughter    • Seizures Daughter         Epilepsy   • Migraines Son        Surgical History:   Past Surgical History:   Procedure Laterality Date   • GALLBLADDER SURGERY      2013 laparoscopically    • LAPAROSCOPIC LYSIS OF ADHESIONS      peritoneal 2014 after SBO   • SINUS SURGERY      2007 for persistent congestion ?? turbinate reduction   • TONSILLECTOMY      1978         Current Outpatient Medications:   •  omeprazole (priLOSEC) 40 MG capsule, , Disp: , Rfl:   •  cholecalciferol (VITAMIN D3) 1000 units " tablet, Take 1 tablet by mouth Daily., Disp: 30 tablet, Rfl: 5  •  losartan (COZAAR) 50 MG tablet, Take 1 tablet by mouth Daily., Disp: 30 tablet, Rfl: 5  •  psyllium (METAMUCIL) 58.6 % powder, Take  by mouth Daily., Disp: , Rfl: 12    Vitals:    07/23/19 1454   BP: 130/90   Pulse: 97   Resp: 12   Temp: 97.9 °F (36.6 °C)   SpO2: 98%     Recheck blood pressure: 140/100    Physical Exam   Constitutional: He is oriented to person, place, and time. He appears well-developed and well-nourished. He is cooperative. No distress.   HENT:   Head: Normocephalic and atraumatic.   Right Ear: Hearing, tympanic membrane, external ear and ear canal normal.   Left Ear: Hearing, tympanic membrane, external ear and ear canal normal.   Nose: Nose normal.   Mouth/Throat: Uvula is midline, oropharynx is clear and moist and mucous membranes are normal. No oropharyngeal exudate.   Eyes: Conjunctivae, EOM and lids are normal. Pupils are equal, round, and reactive to light. Right eye exhibits no discharge. Left eye exhibits no discharge. No scleral icterus.   Neck: Normal range of motion and full passive range of motion without pain. Neck supple. Carotid bruit is not present. No thyroid mass and no thyromegaly present.   Cardiovascular: Normal rate, regular rhythm, S1 normal, S2 normal and normal heart sounds. Exam reveals no gallop and no friction rub.   No murmur heard.  Pulses:       Radial pulses are 2+ on the right side, and 2+ on the left side.        Dorsalis pedis pulses are 2+ on the right side, and 2+ on the left side.        Posterior tibial pulses are 2+ on the right side, and 2+ on the left side.   Pulmonary/Chest: Effort normal and breath sounds normal. No respiratory distress. He has no wheezes. He has no rhonchi. He has no rales.   Abdominal: Soft. Bowel sounds are normal. He exhibits no distension and no mass. There is no hepatosplenomegaly. There is no tenderness. There is no rebound and no guarding.   Genitourinary: Rectum  normal. Rectal exam shows no external hemorrhoid and anal tone normal. Prostate is enlarged (diffusely, no nodules). Prostate is not tender.   Musculoskeletal: Normal range of motion. He exhibits no edema.        Right hand: He exhibits normal range of motion, no tenderness, no bony tenderness and no deformity. Normal strength noted. He exhibits no finger abduction and no thumb/finger opposition.        Left hand: He exhibits normal range of motion, no tenderness, no bony tenderness and no deformity. Normal strength noted. He exhibits no finger abduction and no thumb/finger opposition.     Vascular Status -  His right foot exhibits normal foot vasculature  and no edema. His left foot exhibits normal foot vasculature  and no edema.  Skin Integrity  -  His right foot skin is intact.His left foot skin is intact..  Lymphadenopathy:     He has no cervical adenopathy.     He has no axillary adenopathy.        Right: No inguinal adenopathy present.        Left: No inguinal adenopathy present.   Neurological: He is alert and oriented to person, place, and time. He has normal strength and normal reflexes. He displays no tremor. No cranial nerve deficit or sensory deficit. He exhibits normal muscle tone. Gait normal.   Skin: Skin is warm, dry and intact. No rash noted. No cyanosis. Nails show no clubbing (no nail pitting).        Psychiatric: He has a normal mood and affect. His speech is normal and behavior is normal. Cognition and memory are normal.   Vitals reviewed.      Assessment/ Plan  Diagnoses and all orders for this visit:    Healthcare maintenance  -     Hepatitis A Vaccine Adult IM    Upper back pain, chronic    Squamous cell carcinoma of skin    Rosacea    Denis's esophagus with dysplasia    Obesity (BMI 30-39.9)    IFG (impaired fasting glucose)    Hereditary hemochromatosis (CMS/HCC)    Fatty liver    Dyslipidemia    Diverticulosis of large intestine without hemorrhage    Avitaminosis D  -     cholecalciferol  (VITAMIN D3) 1000 units tablet; Take 1 tablet by mouth Daily.    Essential hypertension  -     losartan (COZAAR) 50 MG tablet; Take 1 tablet by mouth Daily.    Irregular bowel habits  -     psyllium (METAMUCIL) 58.6 % powder; Take  by mouth Daily.        Return in about 4 weeks (around 8/20/2019) for Recheck.      Discussion:  *Matias Hughes is a 48 y.o. male RTC in yearly CPE, review of medical issues:   1. Squamous Cell CA of skin ~2014 - s/p excision with derm, sees derm q 6 months.   2. Hemachromatosis - phlebotomy ~ 4x/ year with goal ferritin < 50 per Dr. Noonan. Phlebotomy next week with recent ferritin ~80.   3. Hx of chronic prostatitis - NAGI benign today, U/A clear. Monitor.    4. Rosacea - off minocycline for years now.  PRN topicals from derm. ROBERT today.   6. Vitamin D deficiency - restart 1000 I.U. Vitamin D3 OTC daily.   7. Dyslipidemia, low HDL - diet and exericse urged today and reviewed CV risk with low HDL. Weight loss advised.   8. Reflux esophagitis/ Denis's Esophagus - repeat  EGD 11/2018 --> repeat 3 years. C/W PPI daily.   --> Bowel habit change - more irregular since on PPI. Start fiber supplement daily.   9. IFG/ Obesity - A1C OK On labs.  Diet and exercise mods urged. Start fiber supplement daily.   10. Hand pain - do not suspect rheumatoid or OA based on exam; likely tendon strain issues. Reassured pt.   11. HTN - new dx today, after long hx of pre-HTN and recent weight gain.  Start losartan 50mg daily.  Trend pressure in 4 weeks with BMP. Weight loss advised.   12. HM - labs d/w pt; Flu/ Tdap - UTD; Hep A #1 today; SHingrix at pharmacy; C-scope UTD 2011 --> 10/2017 --> 10 years; NAGI OK, discuss PSA at age 48, next visit; add additional exercise    RTC 4 weeks, NF labs prior  Hep A #2 in 6 month visit.

## 2019-07-25 ENCOUNTER — APPOINTMENT (OUTPATIENT)
Dept: ONCOLOGY | Facility: HOSPITAL | Age: 49
End: 2019-07-25

## 2019-07-25 ENCOUNTER — APPOINTMENT (OUTPATIENT)
Dept: OTHER | Facility: HOSPITAL | Age: 49
End: 2019-07-25

## 2019-07-31 ENCOUNTER — INFUSION (OUTPATIENT)
Dept: ONCOLOGY | Facility: HOSPITAL | Age: 49
End: 2019-07-31

## 2019-07-31 VITALS
HEART RATE: 78 BPM | WEIGHT: 232.4 LBS | RESPIRATION RATE: 16 BRPM | BODY MASS INDEX: 33.35 KG/M2 | TEMPERATURE: 97.9 F | DIASTOLIC BLOOD PRESSURE: 81 MMHG | OXYGEN SATURATION: 98 % | SYSTOLIC BLOOD PRESSURE: 128 MMHG

## 2019-07-31 DIAGNOSIS — E83.110 HEREDITARY HEMOCHROMATOSIS (HCC): Primary | ICD-10-CM

## 2019-07-31 PROCEDURE — 99195 PHLEBOTOMY: CPT | Performed by: INTERNAL MEDICINE

## 2019-07-31 RX ORDER — SODIUM CHLORIDE 9 MG/ML
250 INJECTION, SOLUTION INTRAVENOUS ONCE
Status: COMPLETED | OUTPATIENT
Start: 2019-07-31 | End: 2019-07-31

## 2019-07-31 RX ORDER — SODIUM CHLORIDE 9 MG/ML
250 INJECTION, SOLUTION INTRAVENOUS ONCE
Status: CANCELLED | OUTPATIENT
Start: 2019-07-31

## 2019-07-31 RX ADMIN — SODIUM CHLORIDE 250 ML: 900 INJECTION, SOLUTION INTRAVENOUS at 15:15

## 2019-08-14 DIAGNOSIS — R73.01 IFG (IMPAIRED FASTING GLUCOSE): ICD-10-CM

## 2019-08-14 DIAGNOSIS — R03.0 BORDERLINE BLOOD PRESSURE: Primary | ICD-10-CM

## 2019-08-19 LAB
BUN SERPL-MCNC: 16 MG/DL (ref 6–20)
BUN/CREAT SERPL: 17.8 (ref 7–25)
CALCIUM SERPL-MCNC: 9.3 MG/DL (ref 8.6–10.5)
CHLORIDE SERPL-SCNC: 100 MMOL/L (ref 98–107)
CO2 SERPL-SCNC: 25.4 MMOL/L (ref 22–29)
CREAT SERPL-MCNC: 0.9 MG/DL (ref 0.76–1.27)
GLUCOSE SERPL-MCNC: 113 MG/DL (ref 65–99)
POTASSIUM SERPL-SCNC: 4.2 MMOL/L (ref 3.5–5.2)
SODIUM SERPL-SCNC: 139 MMOL/L (ref 136–145)

## 2019-08-27 ENCOUNTER — OFFICE VISIT (OUTPATIENT)
Dept: INTERNAL MEDICINE | Facility: CLINIC | Age: 49
End: 2019-08-27

## 2019-08-27 VITALS
TEMPERATURE: 98.7 F | OXYGEN SATURATION: 99 % | HEIGHT: 70 IN | SYSTOLIC BLOOD PRESSURE: 130 MMHG | BODY MASS INDEX: 32.78 KG/M2 | WEIGHT: 229 LBS | HEART RATE: 84 BPM | DIASTOLIC BLOOD PRESSURE: 82 MMHG

## 2019-08-27 DIAGNOSIS — R73.01 IFG (IMPAIRED FASTING GLUCOSE): ICD-10-CM

## 2019-08-27 DIAGNOSIS — I10 ESSENTIAL HYPERTENSION: Primary | ICD-10-CM

## 2019-08-27 DIAGNOSIS — E66.9 OBESITY (BMI 30-39.9): ICD-10-CM

## 2019-08-27 PROCEDURE — 99213 OFFICE O/P EST LOW 20 MIN: CPT | Performed by: INTERNAL MEDICINE

## 2019-08-27 NOTE — PROGRESS NOTES
"Hypertension      HPI  Matias Hughes is a 49 y.o. male RTC In short interval f/u:  Recent dx of HTN. Has started with losartan.  Notes will get \"a little dizzy\" from to time, but is fleeting.   NO vertigo.  Feels like that is getting better though the longer he takes it.  Pressures have been good on home log.   Otherwise no issues with med.   Was on cruise, so surprised weight is down.  Is cutting back on sweets.  Getting some exercise.  Really not much salt addition to food.  Is eating out \"fair amount\", less in fall and spring.     Knows needs to loose weight. Did loose about 20# last year when on weight watchers, but admits he was not consistent with changes.  Is aware of blood sugar issues in past and weight now.  \"Ya, 20# or so, right?\".   Feels like diet can be better.     Review of Systems   Constitution: Negative for weight loss.   Cardiovascular: Negative for chest pain, dyspnea on exertion and leg swelling.   Respiratory: Negative for shortness of breath.    Neurological: Positive for dizziness (improving). Negative for headaches and vertigo.       The following portions of the patient's history were reviewed and updated as appropriate: allergies, current medications, past medical history, past social history and problem list.      Current Outpatient Medications:   •  cholecalciferol (VITAMIN D3) 1000 units tablet, Take 1 tablet by mouth Daily., Disp: 30 tablet, Rfl: 5  •  losartan (COZAAR) 50 MG tablet, Take 1 tablet by mouth Daily., Disp: 30 tablet, Rfl: 5  •  omeprazole (priLOSEC) 40 MG capsule, , Disp: , Rfl:   •  psyllium (METAMUCIL) 58.6 % powder, Take  by mouth Daily., Disp: , Rfl: 12    Vitals:    08/27/19 1343   BP: 130/82   Pulse: 84   Temp: 98.7 °F (37.1 °C)   SpO2: 99%   Weight: 104 kg (229 lb)   Height: 177.8 cm (70\")         Physical Exam   Constitutional: He is oriented to person, place, and time. He appears well-developed and well-nourished. No distress.   HENT:   Head: Normocephalic and " atraumatic.   Eyes: Pupils are equal, round, and reactive to light.   Neck: Normal range of motion. Neck supple. Carotid bruit is not present.   Cardiovascular: Normal rate, regular rhythm and normal heart sounds.   Pulses:       Carotid pulses are 2+ on the right side, and 2+ on the left side.       Radial pulses are 2+ on the right side, and 2+ on the left side.   Pulmonary/Chest: Effort normal and breath sounds normal. No respiratory distress. He has no wheezes. He has no rales.   Lymphadenopathy:     He has no cervical adenopathy.   Neurological: He is alert and oriented to person, place, and time. No cranial nerve deficit.   Psychiatric: He has a normal mood and affect. His behavior is normal.   Vitals reviewed.      Assessment/ Plan  Diagnoses and all orders for this visit:    Essential hypertension    Obesity (BMI 30-39.9)    IFG (impaired fasting glucose)        Return in about 5 months (around 1/27/2020) for Recheck.      Discussion:  Matias Hughes is a 49 y.o. male RTC In short interval f/u:    1. HTN - new dx last visit. Tolerating losartan overall well with only noted mild dizziness, improving.  BMP OK 8/2019.  HOme log and pressure check today improved. C/W same med. Weight loss advised.   2. IFG/ Obeisty - addressed again today. Pt lost weight in past with weight watchers, but was inconsistent with changes. D/W pt need for weight loss and consistent changes.  Trend weight and FBS next visit.   3. HM - Shingrix #1 completed 8/2019, chart updated.     RTC 5 months, F labs prior (CMP, A1C)

## 2019-10-10 DIAGNOSIS — E83.110 HEREDITARY HEMOCHROMATOSIS (HCC): Primary | ICD-10-CM

## 2019-10-17 ENCOUNTER — INFUSION (OUTPATIENT)
Dept: ONCOLOGY | Facility: HOSPITAL | Age: 49
End: 2019-10-17

## 2019-10-17 ENCOUNTER — LAB (OUTPATIENT)
Dept: OTHER | Facility: HOSPITAL | Age: 49
End: 2019-10-17

## 2019-10-17 VITALS
SYSTOLIC BLOOD PRESSURE: 129 MMHG | WEIGHT: 232.2 LBS | HEART RATE: 86 BPM | OXYGEN SATURATION: 97 % | BODY MASS INDEX: 33.32 KG/M2 | RESPIRATION RATE: 16 BRPM | DIASTOLIC BLOOD PRESSURE: 89 MMHG | TEMPERATURE: 97.4 F

## 2019-10-17 DIAGNOSIS — E83.110 HEREDITARY HEMOCHROMATOSIS (HCC): Primary | ICD-10-CM

## 2019-10-17 DIAGNOSIS — E83.110 HEREDITARY HEMOCHROMATOSIS (HCC): ICD-10-CM

## 2019-10-17 LAB
ALBUMIN SERPL-MCNC: 4.9 G/DL (ref 3.5–5.2)
ALBUMIN/GLOB SERPL: 1.8 G/DL
ALP SERPL-CCNC: 61 U/L (ref 39–117)
ALT SERPL W P-5'-P-CCNC: 48 U/L (ref 1–41)
ANION GAP SERPL CALCULATED.3IONS-SCNC: 13 MMOL/L (ref 5–15)
AST SERPL-CCNC: 33 U/L (ref 1–40)
BASOPHILS # BLD AUTO: 0.02 10*3/MM3 (ref 0–0.2)
BASOPHILS NFR BLD AUTO: 0.4 % (ref 0–1.5)
BILIRUB SERPL-MCNC: 0.7 MG/DL (ref 0.1–1.2)
BUN BLD-MCNC: 13 MG/DL (ref 6–20)
BUN/CREAT SERPL: 12 (ref 7–25)
CALCIUM SPEC-SCNC: 9.3 MG/DL (ref 8.6–10.5)
CHLORIDE SERPL-SCNC: 100 MMOL/L (ref 98–107)
CO2 SERPL-SCNC: 27 MMOL/L (ref 22–29)
CREAT BLD-MCNC: 1.08 MG/DL (ref 0.76–1.27)
DEPRECATED RDW RBC AUTO: 44.1 FL (ref 37–54)
EOSINOPHIL # BLD AUTO: 0.06 10*3/MM3 (ref 0–0.4)
EOSINOPHIL NFR BLD AUTO: 1.1 % (ref 0.3–6.2)
ERYTHROCYTE [DISTWIDTH] IN BLOOD BY AUTOMATED COUNT: 13.5 % (ref 12.3–15.4)
FERRITIN SERPL-MCNC: 68.1 NG/ML (ref 30–400)
GFR SERPL CREATININE-BSD FRML MDRD: 72 ML/MIN/1.73
GLOBULIN UR ELPH-MCNC: 2.8 GM/DL
GLUCOSE BLD-MCNC: 96 MG/DL (ref 65–99)
HCT VFR BLD AUTO: 42.7 % (ref 37.5–51)
HGB BLD-MCNC: 14.6 G/DL (ref 13–17.7)
IMM GRANULOCYTES # BLD AUTO: 0.01 10*3/MM3 (ref 0–0.05)
IMM GRANULOCYTES NFR BLD AUTO: 0.2 % (ref 0–0.5)
LYMPHOCYTES # BLD AUTO: 1.59 10*3/MM3 (ref 0.7–3.1)
LYMPHOCYTES NFR BLD AUTO: 29.4 % (ref 19.6–45.3)
MCH RBC QN AUTO: 30.2 PG (ref 26.6–33)
MCHC RBC AUTO-ENTMCNC: 34.2 G/DL (ref 31.5–35.7)
MCV RBC AUTO: 88.4 FL (ref 79–97)
MONOCYTES # BLD AUTO: 0.38 10*3/MM3 (ref 0.1–0.9)
MONOCYTES NFR BLD AUTO: 7 % (ref 5–12)
NEUTROPHILS # BLD AUTO: 3.35 10*3/MM3 (ref 1.7–7)
NEUTROPHILS NFR BLD AUTO: 61.9 % (ref 42.7–76)
NRBC BLD AUTO-RTO: 0 /100 WBC (ref 0–0.2)
PLATELET # BLD AUTO: 166 10*3/MM3 (ref 140–450)
PMV BLD AUTO: 9.7 FL (ref 6–12)
POTASSIUM BLD-SCNC: 3.9 MMOL/L (ref 3.5–5.2)
PROT SERPL-MCNC: 7.7 G/DL (ref 6–8.5)
RBC # BLD AUTO: 4.83 10*6/MM3 (ref 4.14–5.8)
SODIUM BLD-SCNC: 140 MMOL/L (ref 136–145)
WBC NRBC COR # BLD: 5.41 10*3/MM3 (ref 3.4–10.8)

## 2019-10-17 PROCEDURE — 99195 PHLEBOTOMY: CPT | Performed by: INTERNAL MEDICINE

## 2019-10-17 PROCEDURE — 82728 ASSAY OF FERRITIN: CPT | Performed by: INTERNAL MEDICINE

## 2019-10-17 PROCEDURE — 85025 COMPLETE CBC W/AUTO DIFF WBC: CPT | Performed by: INTERNAL MEDICINE

## 2019-10-17 PROCEDURE — 36415 COLL VENOUS BLD VENIPUNCTURE: CPT

## 2019-10-17 PROCEDURE — 80053 COMPREHEN METABOLIC PANEL: CPT | Performed by: INTERNAL MEDICINE

## 2019-10-17 RX ORDER — SODIUM CHLORIDE 9 MG/ML
250 INJECTION, SOLUTION INTRAVENOUS ONCE
Status: CANCELLED | OUTPATIENT
Start: 2019-10-17

## 2020-01-16 ENCOUNTER — TELEPHONE (OUTPATIENT)
Dept: ONCOLOGY | Facility: OTHER | Age: 50
End: 2020-01-16

## 2020-01-16 NOTE — TELEPHONE ENCOUNTER
Matias is needing a I year follow up to see dr. Noonan and labs   I couldn't find an opening.    Call patient back at [phone]

## 2020-01-17 ENCOUNTER — TELEPHONE (OUTPATIENT)
Dept: INTERNAL MEDICINE | Facility: CLINIC | Age: 50
End: 2020-01-17

## 2020-01-24 ENCOUNTER — TELEPHONE (OUTPATIENT)
Dept: ONCOLOGY | Facility: CLINIC | Age: 50
End: 2020-01-24

## 2020-01-24 DIAGNOSIS — R73.01 IFG (IMPAIRED FASTING GLUCOSE): Primary | ICD-10-CM

## 2020-01-24 DIAGNOSIS — I10 ESSENTIAL HYPERTENSION: ICD-10-CM

## 2020-01-24 DIAGNOSIS — E66.9 OBESITY (BMI 30-39.9): ICD-10-CM

## 2020-01-24 DIAGNOSIS — K76.0 FATTY LIVER: ICD-10-CM

## 2020-01-24 NOTE — TELEPHONE ENCOUNTER
On 2/4/20 the patient has a Lab and Dr Noonan appointment. He also is having Labs done on 1/31/20. He was wondering instead of having Labs done again at his 2/4/20 appointment could someone tell him what is needed and have it done at his 1/31/20 appointment.  Please give patient a call at [phone]  Ok to have at 1/31 appointment. Needs CBC CMP and ferritin iron panel. Message left. To bring results to appointment.

## 2020-01-31 LAB
ALBUMIN SERPL-MCNC: 5.1 G/DL (ref 3.5–5.2)
ALBUMIN/GLOB SERPL: 2.2 G/DL
ALP SERPL-CCNC: 68 U/L (ref 39–117)
ALT SERPL-CCNC: 44 U/L (ref 1–41)
AST SERPL-CCNC: 24 U/L (ref 1–40)
BASOPHILS # BLD AUTO: 0.02 10*3/MM3 (ref 0–0.2)
BASOPHILS NFR BLD AUTO: 0.4 % (ref 0–1.5)
BILIRUB SERPL-MCNC: 1 MG/DL (ref 0.2–1.2)
BUN SERPL-MCNC: 13 MG/DL (ref 6–20)
BUN/CREAT SERPL: 12.1 (ref 7–25)
CALCIUM SERPL-MCNC: 9.8 MG/DL (ref 8.6–10.5)
CHLORIDE SERPL-SCNC: 97 MMOL/L (ref 98–107)
CO2 SERPL-SCNC: 24.3 MMOL/L (ref 22–29)
CREAT SERPL-MCNC: 1.07 MG/DL (ref 0.76–1.27)
EOSINOPHIL # BLD AUTO: 0.05 10*3/MM3 (ref 0–0.4)
EOSINOPHIL NFR BLD AUTO: 1 % (ref 0.3–6.2)
ERYTHROCYTE [DISTWIDTH] IN BLOOD BY AUTOMATED COUNT: 13 % (ref 12.3–15.4)
FERRITIN SERPL-MCNC: 106 NG/ML (ref 30–400)
GLOBULIN SER CALC-MCNC: 2.3 GM/DL
GLUCOSE SERPL-MCNC: 106 MG/DL (ref 65–99)
HBA1C MFR BLD: 5.4 % (ref 4.8–5.6)
HCT VFR BLD AUTO: 44.5 % (ref 37.5–51)
HGB BLD-MCNC: 15.2 G/DL (ref 13–17.7)
IMM GRANULOCYTES # BLD AUTO: 0.01 10*3/MM3 (ref 0–0.05)
IMM GRANULOCYTES NFR BLD AUTO: 0.2 % (ref 0–0.5)
IRON SATN MFR SERPL: 31 % (ref 20–50)
IRON SERPL-MCNC: 138 MCG/DL (ref 59–158)
LYMPHOCYTES # BLD AUTO: 1.65 10*3/MM3 (ref 0.7–3.1)
LYMPHOCYTES NFR BLD AUTO: 32.4 % (ref 19.6–45.3)
MCH RBC QN AUTO: 29.2 PG (ref 26.6–33)
MCHC RBC AUTO-ENTMCNC: 34.2 G/DL (ref 31.5–35.7)
MCV RBC AUTO: 85.6 FL (ref 79–97)
MONOCYTES # BLD AUTO: 0.41 10*3/MM3 (ref 0.1–0.9)
MONOCYTES NFR BLD AUTO: 8 % (ref 5–12)
NEUTROPHILS # BLD AUTO: 2.96 10*3/MM3 (ref 1.7–7)
NEUTROPHILS NFR BLD AUTO: 58 % (ref 42.7–76)
NRBC BLD AUTO-RTO: 0 /100 WBC (ref 0–0.2)
PLATELET # BLD AUTO: 187 10*3/MM3 (ref 140–450)
POTASSIUM SERPL-SCNC: 4.3 MMOL/L (ref 3.5–5.2)
PROT SERPL-MCNC: 7.4 G/DL (ref 6–8.5)
RBC # BLD AUTO: 5.2 10*6/MM3 (ref 4.14–5.8)
SODIUM SERPL-SCNC: 137 MMOL/L (ref 136–145)
TIBC SERPL-MCNC: 444 MCG/DL
UIBC SERPL-MCNC: 306 MCG/DL (ref 112–346)
WBC # BLD AUTO: 5.1 10*3/MM3 (ref 3.4–10.8)

## 2020-02-04 ENCOUNTER — INFUSION (OUTPATIENT)
Dept: ONCOLOGY | Facility: HOSPITAL | Age: 50
End: 2020-02-04

## 2020-02-04 ENCOUNTER — APPOINTMENT (OUTPATIENT)
Dept: OTHER | Facility: HOSPITAL | Age: 50
End: 2020-02-04

## 2020-02-04 ENCOUNTER — OFFICE VISIT (OUTPATIENT)
Dept: ONCOLOGY | Facility: CLINIC | Age: 50
End: 2020-02-04

## 2020-02-04 VITALS
SYSTOLIC BLOOD PRESSURE: 128 MMHG | DIASTOLIC BLOOD PRESSURE: 87 MMHG | RESPIRATION RATE: 16 BRPM | BODY MASS INDEX: 33.56 KG/M2 | OXYGEN SATURATION: 97 % | WEIGHT: 234.4 LBS | TEMPERATURE: 98.2 F | HEART RATE: 100 BPM | HEIGHT: 70 IN

## 2020-02-04 DIAGNOSIS — E83.110 HEREDITARY HEMOCHROMATOSIS (HCC): Primary | ICD-10-CM

## 2020-02-04 PROCEDURE — 99214 OFFICE O/P EST MOD 30 MIN: CPT | Performed by: INTERNAL MEDICINE

## 2020-02-04 PROCEDURE — 99195 PHLEBOTOMY: CPT

## 2020-02-04 RX ORDER — SODIUM CHLORIDE 9 MG/ML
250 INJECTION, SOLUTION INTRAVENOUS ONCE
Status: COMPLETED | OUTPATIENT
Start: 2020-02-04 | End: 2020-02-04

## 2020-02-04 RX ORDER — SODIUM CHLORIDE 9 MG/ML
250 INJECTION, SOLUTION INTRAVENOUS ONCE
Status: CANCELLED | OUTPATIENT
Start: 2020-02-11

## 2020-02-04 RX ADMIN — SODIUM CHLORIDE 250 ML: 900 INJECTION, SOLUTION INTRAVENOUS at 14:36

## 2020-02-04 NOTE — PROGRESS NOTES
Subjective   REASON FOR FOLLOWUP: Hereditary hemochromatosis, undergoing phlebotomy with the goal of getting his ferritin around 50.     HISTORY OF PRESENT ILLNESS:   Mr. Hughes is a pleasant 49 y.o. gentleman with hereditary hemochromatosis, undergoing therapeutic phlebotomy to try to keep his ferritin below 100 and closer to 50, if possible. He has been having his ferritin drawn every 2-3 months.    He looks great in the office today.  His ferritin level from 1/31/2020 was 106 therefore we will proceed with phlebotomy in the office today and continue checking his labs every 3 months.      History of Present Illness      Past Medical History, Past Surgical History, Social History, Family History have been reviewed and are without significant changes except as mentioned.    Review of Systems   Constitutional: Negative for activity change, appetite change, fatigue, fever and unexpected weight change.   HENT: Negative for hearing loss, nosebleeds, trouble swallowing and voice change.    Eyes: Negative for visual disturbance.   Respiratory: Negative for cough, shortness of breath and wheezing.    Cardiovascular: Negative for chest pain and palpitations.   Gastrointestinal: Negative for abdominal pain, diarrhea, nausea and vomiting.   Genitourinary: Negative for difficulty urinating, frequency, hematuria and urgency.   Musculoskeletal: Negative for back pain and neck pain.   Skin: Negative for rash.   Neurological: Negative for dizziness, seizures, syncope and headaches.   Hematological: Negative for adenopathy. Does not bruise/bleed easily.   Psychiatric/Behavioral: Negative for behavioral problems. The patient is not nervous/anxious.       A comprehensive 14 point review of systems was performed and was negative except as mentioned.    Medications:  The current medication list was reviewed in the EMR    ALLERGIES:    Allergies   Allergen Reactions   • Cefdinir Unknown - Low Severity       Objective      Vitals:     "02/04/20 1337   BP: 128/87   Pulse: 100   Resp: 16   Temp: 98.2 °F (36.8 °C)   TempSrc: Oral   SpO2: 97%   Weight: 106 kg (234 lb 6.4 oz)   Height: 177.8 cm (70\")   PainSc: 0-No pain     Current Status 2/4/2020   ECOG score 0       Physical Exam   Constitutional: He is oriented to person, place, and time. He appears well-developed and well-nourished. No distress.   HENT:   Head: Normocephalic.   Eyes: Pupils are equal, round, and reactive to light. Conjunctivae and EOM are normal. No scleral icterus.   Neck: Normal range of motion. Neck supple. No JVD present. No thyromegaly present.   Cardiovascular: Normal rate and regular rhythm. Exam reveals no gallop and no friction rub.   No murmur heard.  Pulmonary/Chest: Effort normal and breath sounds normal. He has no wheezes. He has no rales.   Abdominal: Soft. He exhibits no distension and no mass. There is no tenderness.   Musculoskeletal: Normal range of motion. He exhibits no edema or deformity.   Lymphadenopathy:     He has no cervical adenopathy.   Neurological: He is alert and oriented to person, place, and time. He has normal reflexes. No cranial nerve deficit.   Skin: Skin is warm and dry. No rash noted. No erythema.   Psychiatric: He has a normal mood and affect. His behavior is normal. Judgment normal.         RECENT LABS:  Hematology WBC   Date Value Ref Range Status   01/31/2020 5.10 3.40 - 10.80 10*3/mm3 Final     RBC   Date Value Ref Range Status   01/31/2020 5.20 4.14 - 5.80 10*6/mm3 Final     Hemoglobin   Date Value Ref Range Status   01/31/2020 15.2 13.0 - 17.7 g/dL Final   10/17/2019 14.6 13.0 - 17.7 g/dL Final     Hematocrit   Date Value Ref Range Status   01/31/2020 44.5 37.5 - 51.0 % Final   10/17/2019 42.7 37.5 - 51.0 % Final     Platelets   Date Value Ref Range Status   01/31/2020 187 140 - 450 10*3/mm3 Final   10/17/2019 166 140 - 450 10*3/mm3 Final            Lab Results   Component Value Date    FERRITIN 106.00 01/31/2020       Assessment/Plan "     ASSESSMENT:   1. Hemochromatosis, undergoing therapeutic phlebotomy with the goal of keeping his ferritin around 50.   2. Intermittent mild thrombocytopenia, most likely due to chronic ITP. Platelets were normal on his labs last week.  3. Partial small bowel obstruction due to adhesions September 2016.  This resolved spontaneously and is not having any further symptoms at this time.  4.  Reflux esophagitis.  He had recent colonoscopy and EGD showing some esophagitis and has been taking Protonix and Carafate and had a follow up EGD in November        PLAN:   1. Mr. Hughes will undergo phleboto my 500 cc in the office today along with 250 cc saline hydration.   2. He will be returning every 3 months for lab with R.N. review and phlebotomy p.r.n.   3. He will see a physician again in the office in 12 months.  Labs will be drawn one week prior to that visit so that he can undergo phlebotomy on the day of the M.D. visit if his ferritin is greater than 50.                2/4/2020      CC:

## 2020-02-07 ENCOUNTER — OFFICE VISIT (OUTPATIENT)
Dept: INTERNAL MEDICINE | Facility: CLINIC | Age: 50
End: 2020-02-07

## 2020-02-07 VITALS
TEMPERATURE: 98.9 F | DIASTOLIC BLOOD PRESSURE: 70 MMHG | HEART RATE: 100 BPM | OXYGEN SATURATION: 98 % | SYSTOLIC BLOOD PRESSURE: 126 MMHG | WEIGHT: 232 LBS | HEIGHT: 70 IN | BODY MASS INDEX: 33.21 KG/M2

## 2020-02-07 DIAGNOSIS — R73.01 IFG (IMPAIRED FASTING GLUCOSE): ICD-10-CM

## 2020-02-07 DIAGNOSIS — K22.719 BARRETT'S ESOPHAGUS WITH DYSPLASIA: ICD-10-CM

## 2020-02-07 DIAGNOSIS — K59.00 CONSTIPATION, UNSPECIFIED CONSTIPATION TYPE: ICD-10-CM

## 2020-02-07 DIAGNOSIS — E83.110 HEREDITARY HEMOCHROMATOSIS (HCC): ICD-10-CM

## 2020-02-07 DIAGNOSIS — Z00.00 HEALTHCARE MAINTENANCE: ICD-10-CM

## 2020-02-07 DIAGNOSIS — E66.9 OBESITY (BMI 30-39.9): ICD-10-CM

## 2020-02-07 DIAGNOSIS — I10 ESSENTIAL HYPERTENSION: Primary | ICD-10-CM

## 2020-02-07 PROCEDURE — 90471 IMMUNIZATION ADMIN: CPT | Performed by: INTERNAL MEDICINE

## 2020-02-07 PROCEDURE — 99214 OFFICE O/P EST MOD 30 MIN: CPT | Performed by: INTERNAL MEDICINE

## 2020-02-07 PROCEDURE — 90632 HEPA VACCINE ADULT IM: CPT | Performed by: INTERNAL MEDICINE

## 2020-02-07 RX ORDER — LOSARTAN POTASSIUM 50 MG/1
50 TABLET ORAL DAILY
Qty: 90 TABLET | Refills: 3 | Status: SHIPPED | OUTPATIENT
Start: 2020-02-07 | End: 2021-03-10

## 2020-02-07 NOTE — PROGRESS NOTES
"Hypertension (his b/p machine-137/77) and Labs Only      HPI  Matias Hughes is a 49 y.o. male RTC In f/u:   1. HTN - new dx in 2019.  Has been checking at home.  120's-80's at home.  Has cuff here today and correlates well with office numbers. Is letting arm hang down when checking.  Resting only some prior to checks, not as much as needs.   2. IFG/ Obesity - overall no major changes. \"Diet is probably a bit  Better\".  Is at gym but 'at infancy stages'.   Weight is stable overall.  Feels like is just getting going on changes.   3. Hemachromatosis - phlebotomy ~ 4x/ year with goal ferritin < 50 per Dr. Noonan - saw Dr. Soto last week and has Phlebotomy.  Will see them q 3 months. NO issues with phlebotomy.   4. Reflux esophagitis/ Denis's Esophagus - \"a little bit better\". On 40 mg Omeprazole. Has some constipation with med. However, reflux sx are controlled on current med.  WOnders if can take lower dose PPI. Seems to remember that last path was clear and was OK, but is back in 3 years for EGD.  5. HM -needs Hep A #2 today    Review of Systems   Constitution: Negative for chills, fever and weight loss.   Cardiovascular: Negative for chest pain, dyspnea on exertion, irregular heartbeat and palpitations.   Respiratory: Negative for shortness of breath.    Gastrointestinal: Positive for constipation (mild). Negative for diarrhea and heartburn.   Neurological: Negative for dizziness and light-headedness.       The following portions of the patient's history were reviewed and updated as appropriate: allergies, current medications, past medical history, past social history and problem list.      Current Outpatient Medications:   •  cholecalciferol (VITAMIN D3) 1000 units tablet, Take 1 tablet by mouth Daily., Disp: 30 tablet, Rfl: 5  •  losartan (COZAAR) 50 MG tablet, Take 1 tablet by mouth Daily., Disp: 30 tablet, Rfl: 5  •  omeprazole (priLOSEC) 40 MG capsule, , Disp: , Rfl:     Vitals:    02/07/20 1359   BP: 126/70 " "  Pulse: 100   Temp: 98.9 °F (37.2 °C)   SpO2: 98%   Weight: 105 kg (232 lb)   Height: 177.8 cm (70\")     Body mass index is 33.29 kg/m².      Physical Exam   Constitutional: He is oriented to person, place, and time. He appears well-developed and well-nourished. No distress.   HENT:   Head: Normocephalic and atraumatic.   Mouth/Throat: Oropharynx is clear and moist. No oropharyngeal exudate.   Eyes: Pupils are equal, round, and reactive to light. Conjunctivae are normal. No scleral icterus.   Neck: Normal range of motion. Neck supple. Carotid bruit is not present.   Cardiovascular: Normal rate, regular rhythm and normal heart sounds.   Pulses:       Carotid pulses are 2+ on the right side, and 2+ on the left side.       Radial pulses are 2+ on the right side, and 2+ on the left side.   Pulmonary/Chest: Effort normal and breath sounds normal. No respiratory distress. He has no wheezes. He has no rales.   Musculoskeletal: He exhibits no edema.   Neurological: He is alert and oriented to person, place, and time. No cranial nerve deficit.   Psychiatric: He has a normal mood and affect. His behavior is normal.   Vitals reviewed.      Assessment/ Plan  Diagnoses and all orders for this visit:    Essential hypertension    Healthcare maintenance  -     Hepatitis A Vaccine Adult IM    Denis's esophagus with dysplasia    Hereditary hemochromatosis (CMS/HCC)    Obesity (BMI 30-39.9)    IFG (impaired fasting glucose)    Constipation, unspecified constipation type        Return in about 6 months (around 8/7/2020) for Annual physical.      Discussion:  Matias Hughes is a 49 y.o. male RTC In f/u:   1. HTN - new dx in 2019, controlled on one drug.  Weight loss advised.  BMP OK.  2. IFG/ Obeisty - FBS is mildly elevated.  A1C is trending up, but still in normal range.  Urged pt to c/w early efforts and diet and exercise.  Weight loss advised.   3. Hemachromatosis - phlebotomy ~ 4x/ year with goal ferritin < 50 per Dr. Noonan - " reviewed 2/4/20 note with phlebotomy completed and c/w goal ferritin <80.  Will see Heme q 3 months.  4. Reflux esophagitis/ Denis's Esophagus repeat  EGD 11/2018 --> repeat 3 years - controlled on 40 mg Omeprazole. Constipation again noted, pt attributes to PPI use.  Prefer trial of fiber daily +/- colace daily initially, but if not better can trial lower dose PPI at 20mg Prilosec. I would prefer to hold on lowering PPI dose until f/u EGD in 2021, if possible  5. HM -  Flu/ Tdap - UTD; Hep A #2 today; Shingrix at pharmacy    RTC 6 months CPE, F labs prior

## 2020-02-07 NOTE — PATIENT INSTRUCTIONS
Metamucil or Citrucel daily for constipation (fiber supplements)  And / or   Colace OTC  (stool softener).      If all else fails, can try 20mg Prilosec OTC    Magnesium oxide 250mg daily for muscle cramps OTC

## 2020-04-24 ENCOUNTER — RESULTS ENCOUNTER (OUTPATIENT)
Dept: ONCOLOGY | Facility: CLINIC | Age: 50
End: 2020-04-24

## 2020-04-24 DIAGNOSIS — E83.110 HEREDITARY HEMOCHROMATOSIS (HCC): ICD-10-CM

## 2020-05-05 ENCOUNTER — LAB (OUTPATIENT)
Dept: OTHER | Facility: HOSPITAL | Age: 50
End: 2020-05-05

## 2020-05-05 ENCOUNTER — INFUSION (OUTPATIENT)
Dept: ONCOLOGY | Facility: HOSPITAL | Age: 50
End: 2020-05-05

## 2020-05-05 VITALS
HEIGHT: 70 IN | HEART RATE: 78 BPM | WEIGHT: 233.2 LBS | BODY MASS INDEX: 33.39 KG/M2 | RESPIRATION RATE: 18 BRPM | DIASTOLIC BLOOD PRESSURE: 78 MMHG | TEMPERATURE: 98.2 F | OXYGEN SATURATION: 98 % | SYSTOLIC BLOOD PRESSURE: 117 MMHG

## 2020-05-05 DIAGNOSIS — E83.110 HEREDITARY HEMOCHROMATOSIS (HCC): ICD-10-CM

## 2020-05-05 LAB
BASOPHILS # BLD AUTO: 0.02 10*3/MM3 (ref 0–0.2)
BASOPHILS NFR BLD AUTO: 0.4 % (ref 0–1.5)
DEPRECATED RDW RBC AUTO: 43.8 FL (ref 37–54)
EOSINOPHIL # BLD AUTO: 0.09 10*3/MM3 (ref 0–0.4)
EOSINOPHIL NFR BLD AUTO: 2 % (ref 0.3–6.2)
ERYTHROCYTE [DISTWIDTH] IN BLOOD BY AUTOMATED COUNT: 13.7 % (ref 12.3–15.4)
FERRITIN SERPL-MCNC: 62.4 NG/ML (ref 30–400)
HCT VFR BLD AUTO: 43.5 % (ref 37.5–51)
HGB BLD-MCNC: 14.7 G/DL (ref 13–17.7)
IMM GRANULOCYTES # BLD AUTO: 0.01 10*3/MM3 (ref 0–0.05)
IMM GRANULOCYTES NFR BLD AUTO: 0.2 % (ref 0–0.5)
LYMPHOCYTES # BLD AUTO: 1.49 10*3/MM3 (ref 0.7–3.1)
LYMPHOCYTES NFR BLD AUTO: 33 % (ref 19.6–45.3)
MCH RBC QN AUTO: 29.5 PG (ref 26.6–33)
MCHC RBC AUTO-ENTMCNC: 33.8 G/DL (ref 31.5–35.7)
MCV RBC AUTO: 87.3 FL (ref 79–97)
MONOCYTES # BLD AUTO: 0.32 10*3/MM3 (ref 0.1–0.9)
MONOCYTES NFR BLD AUTO: 7.1 % (ref 5–12)
NEUTROPHILS # BLD AUTO: 2.58 10*3/MM3 (ref 1.7–7)
NEUTROPHILS NFR BLD AUTO: 57.3 % (ref 42.7–76)
NRBC BLD AUTO-RTO: 0 /100 WBC (ref 0–0.2)
PLATELET # BLD AUTO: 173 10*3/MM3 (ref 140–450)
PMV BLD AUTO: 10.1 FL (ref 6–12)
RBC # BLD AUTO: 4.98 10*6/MM3 (ref 4.14–5.8)
WBC NRBC COR # BLD: 4.51 10*3/MM3 (ref 3.4–10.8)

## 2020-05-05 PROCEDURE — 36415 COLL VENOUS BLD VENIPUNCTURE: CPT

## 2020-05-05 PROCEDURE — 85025 COMPLETE CBC W/AUTO DIFF WBC: CPT | Performed by: INTERNAL MEDICINE

## 2020-05-05 PROCEDURE — 99195 PHLEBOTOMY: CPT

## 2020-05-05 PROCEDURE — 82728 ASSAY OF FERRITIN: CPT | Performed by: INTERNAL MEDICINE

## 2020-05-05 NOTE — PROGRESS NOTES
"Pt to infusion for labs and possible phlebo  Ferritin 62.40  Per MD parameters , do phlebo for ferritin >50  Phlebotomy done, removed 500cc . Post B/P 107/69 , pt c/o \"feeliing warm\"  Pt laid flat in chair , IVF's 250cc NS given post phlebotomy  1645 B/P 116/75 . Pt states \"he is feeling better \"  Fluids infusing     Post fluids pt without further c/o's . VSS  Discharged home stable  "

## 2020-07-17 ENCOUNTER — RESULTS ENCOUNTER (OUTPATIENT)
Dept: ONCOLOGY | Facility: CLINIC | Age: 50
End: 2020-07-17

## 2020-07-17 DIAGNOSIS — E83.110 HEREDITARY HEMOCHROMATOSIS (HCC): ICD-10-CM

## 2020-08-04 ENCOUNTER — LAB (OUTPATIENT)
Dept: OTHER | Facility: HOSPITAL | Age: 50
End: 2020-08-04

## 2020-08-04 ENCOUNTER — INFUSION (OUTPATIENT)
Dept: ONCOLOGY | Facility: HOSPITAL | Age: 50
End: 2020-08-04

## 2020-08-04 VITALS
SYSTOLIC BLOOD PRESSURE: 144 MMHG | DIASTOLIC BLOOD PRESSURE: 80 MMHG | TEMPERATURE: 98.5 F | HEART RATE: 70 BPM | OXYGEN SATURATION: 98 % | BODY MASS INDEX: 32.76 KG/M2 | WEIGHT: 234 LBS | HEIGHT: 71 IN | RESPIRATION RATE: 18 BRPM

## 2020-08-04 DIAGNOSIS — E83.110 HEREDITARY HEMOCHROMATOSIS (HCC): ICD-10-CM

## 2020-08-04 LAB
BASOPHILS # BLD AUTO: 0.01 10*3/MM3 (ref 0–0.2)
BASOPHILS NFR BLD AUTO: 0.2 % (ref 0–1.5)
DEPRECATED RDW RBC AUTO: 45.6 FL (ref 37–54)
EOSINOPHIL # BLD AUTO: 0.1 10*3/MM3 (ref 0–0.4)
EOSINOPHIL NFR BLD AUTO: 2.5 % (ref 0.3–6.2)
ERYTHROCYTE [DISTWIDTH] IN BLOOD BY AUTOMATED COUNT: 14.2 % (ref 12.3–15.4)
FERRITIN SERPL-MCNC: 61.5 NG/ML (ref 30–400)
HCT VFR BLD AUTO: 40.5 % (ref 37.5–51)
HGB BLD-MCNC: 13.8 G/DL (ref 13–17.7)
IMM GRANULOCYTES # BLD AUTO: 0.01 10*3/MM3 (ref 0–0.05)
IMM GRANULOCYTES NFR BLD AUTO: 0.2 % (ref 0–0.5)
LYMPHOCYTES # BLD AUTO: 1.49 10*3/MM3 (ref 0.7–3.1)
LYMPHOCYTES NFR BLD AUTO: 36.9 % (ref 19.6–45.3)
MCH RBC QN AUTO: 30 PG (ref 26.6–33)
MCHC RBC AUTO-ENTMCNC: 34.1 G/DL (ref 31.5–35.7)
MCV RBC AUTO: 88 FL (ref 79–97)
MONOCYTES # BLD AUTO: 0.26 10*3/MM3 (ref 0.1–0.9)
MONOCYTES NFR BLD AUTO: 6.4 % (ref 5–12)
NEUTROPHILS NFR BLD AUTO: 2.17 10*3/MM3 (ref 1.7–7)
NEUTROPHILS NFR BLD AUTO: 53.8 % (ref 42.7–76)
NRBC BLD AUTO-RTO: 0 /100 WBC (ref 0–0.2)
PLATELET # BLD AUTO: 147 10*3/MM3 (ref 140–450)
PMV BLD AUTO: 9.9 FL (ref 6–12)
RBC # BLD AUTO: 4.6 10*6/MM3 (ref 4.14–5.8)
WBC # BLD AUTO: 4.04 10*3/MM3 (ref 3.4–10.8)

## 2020-08-04 PROCEDURE — 99195 PHLEBOTOMY: CPT

## 2020-08-04 PROCEDURE — 85025 COMPLETE CBC W/AUTO DIFF WBC: CPT | Performed by: INTERNAL MEDICINE

## 2020-08-04 PROCEDURE — 36415 COLL VENOUS BLD VENIPUNCTURE: CPT

## 2020-08-04 PROCEDURE — 82728 ASSAY OF FERRITIN: CPT | Performed by: INTERNAL MEDICINE

## 2020-08-04 NOTE — PROGRESS NOTES
Pt to infusion for possible phlebo - ferritin 61.5  .   Per MD parameters do phlebo for ferritin greater than 50  Phlebotomy done per protocol.  500cc removed . Post B/P 144/80  Pt tolerated procedure well.

## 2020-08-12 DIAGNOSIS — I10 ESSENTIAL HYPERTENSION: ICD-10-CM

## 2020-08-12 DIAGNOSIS — Z00.00 HEALTHCARE MAINTENANCE: Primary | ICD-10-CM

## 2020-08-12 DIAGNOSIS — R73.01 IFG (IMPAIRED FASTING GLUCOSE): ICD-10-CM

## 2020-08-12 DIAGNOSIS — E78.5 DYSLIPIDEMIA: ICD-10-CM

## 2020-08-12 DIAGNOSIS — K76.0 FATTY LIVER: ICD-10-CM

## 2020-08-19 NOTE — TELEPHONE ENCOUNTER
DUPLICATE THERAPY DO NOT FILL. No, I think we need to call Dr. Meehan's office and see if pt can be seen earlier at physician request. If answer is no, then we will change referral to someone else. I am happy to review case with Dr. Meehan if necessary.

## 2020-08-21 LAB
ALBUMIN SERPL-MCNC: 5.1 G/DL (ref 3.5–5.2)
ALBUMIN/GLOB SERPL: 2.7 G/DL
ALP SERPL-CCNC: 65 U/L (ref 39–117)
ALT SERPL-CCNC: 31 U/L (ref 1–41)
APPEARANCE UR: CLEAR
AST SERPL-CCNC: 18 U/L (ref 1–40)
BACTERIA #/AREA URNS HPF: NORMAL /HPF
BASOPHILS # BLD AUTO: 0.02 10*3/MM3 (ref 0–0.2)
BASOPHILS NFR BLD AUTO: 0.4 % (ref 0–1.5)
BILIRUB SERPL-MCNC: 0.8 MG/DL (ref 0–1.2)
BILIRUB UR QL STRIP: NEGATIVE
BUN SERPL-MCNC: 14 MG/DL (ref 6–20)
BUN/CREAT SERPL: 13.6 (ref 7–25)
CALCIUM SERPL-MCNC: 9.3 MG/DL (ref 8.6–10.5)
CHLORIDE SERPL-SCNC: 99 MMOL/L (ref 98–107)
CHOLEST SERPL-MCNC: 147 MG/DL (ref 0–200)
CO2 SERPL-SCNC: 27.7 MMOL/L (ref 22–29)
COLOR UR: YELLOW
CREAT SERPL-MCNC: 1.03 MG/DL (ref 0.76–1.27)
EOSINOPHIL # BLD AUTO: 0.05 10*3/MM3 (ref 0–0.4)
EOSINOPHIL NFR BLD AUTO: 1 % (ref 0.3–6.2)
EPI CELLS #/AREA URNS HPF: NORMAL /HPF (ref 0–10)
ERYTHROCYTE [DISTWIDTH] IN BLOOD BY AUTOMATED COUNT: 14.1 % (ref 12.3–15.4)
GLOBULIN SER CALC-MCNC: 1.9 GM/DL
GLUCOSE SERPL-MCNC: 118 MG/DL (ref 65–99)
GLUCOSE UR QL: NEGATIVE
HBA1C MFR BLD: 4.6 % (ref 4.8–5.6)
HCT VFR BLD AUTO: 44.1 % (ref 37.5–51)
HCV AB S/CO SERPL IA: <0.1 S/CO RATIO (ref 0–0.9)
HDLC SERPL-MCNC: 35 MG/DL (ref 40–60)
HGB BLD-MCNC: 14.5 G/DL (ref 13–17.7)
HGB UR QL STRIP: NEGATIVE
IMM GRANULOCYTES # BLD AUTO: 0.01 10*3/MM3 (ref 0–0.05)
IMM GRANULOCYTES NFR BLD AUTO: 0.2 % (ref 0–0.5)
KETONES UR QL STRIP: NEGATIVE
LDLC SERPL CALC-MCNC: 73 MG/DL (ref 0–100)
LEUKOCYTE ESTERASE UR QL STRIP: NEGATIVE
LYMPHOCYTES # BLD AUTO: 1.53 10*3/MM3 (ref 0.7–3.1)
LYMPHOCYTES NFR BLD AUTO: 31.5 % (ref 19.6–45.3)
MCH RBC QN AUTO: 29.7 PG (ref 26.6–33)
MCHC RBC AUTO-ENTMCNC: 32.9 G/DL (ref 31.5–35.7)
MCV RBC AUTO: 90.4 FL (ref 79–97)
MICRO URNS: NORMAL
MICRO URNS: NORMAL
MONOCYTES # BLD AUTO: 0.36 10*3/MM3 (ref 0.1–0.9)
MONOCYTES NFR BLD AUTO: 7.4 % (ref 5–12)
MUCOUS THREADS URNS QL MICRO: PRESENT /HPF
NEUTROPHILS # BLD AUTO: 2.88 10*3/MM3 (ref 1.7–7)
NEUTROPHILS NFR BLD AUTO: 59.5 % (ref 42.7–76)
NITRITE UR QL STRIP: NEGATIVE
NRBC BLD AUTO-RTO: 0 /100 WBC (ref 0–0.2)
PH UR STRIP: 6.5 [PH] (ref 5–7.5)
PLATELET # BLD AUTO: 177 10*3/MM3 (ref 140–450)
POTASSIUM SERPL-SCNC: 4.6 MMOL/L (ref 3.5–5.2)
PROT SERPL-MCNC: 7 G/DL (ref 6–8.5)
PROT UR QL STRIP: NEGATIVE
RBC # BLD AUTO: 4.88 10*6/MM3 (ref 4.14–5.8)
RBC #/AREA URNS HPF: NORMAL /HPF (ref 0–2)
SODIUM SERPL-SCNC: 137 MMOL/L (ref 136–145)
SP GR UR: 1.02 (ref 1–1.03)
TRIGL SERPL-MCNC: 196 MG/DL (ref 0–150)
TSH SERPL DL<=0.005 MIU/L-ACNC: 1.37 UIU/ML (ref 0.27–4.2)
URINALYSIS REFLEX: NORMAL
UROBILINOGEN UR STRIP-MCNC: 0.2 MG/DL (ref 0.2–1)
VLDLC SERPL CALC-MCNC: 39.2 MG/DL
WBC # BLD AUTO: 4.85 10*3/MM3 (ref 3.4–10.8)
WBC #/AREA URNS HPF: NORMAL /HPF (ref 0–5)

## 2020-08-25 ENCOUNTER — OFFICE VISIT (OUTPATIENT)
Dept: INTERNAL MEDICINE | Facility: CLINIC | Age: 50
End: 2020-08-25

## 2020-08-25 VITALS
DIASTOLIC BLOOD PRESSURE: 80 MMHG | SYSTOLIC BLOOD PRESSURE: 128 MMHG | WEIGHT: 227 LBS | HEART RATE: 77 BPM | OXYGEN SATURATION: 98 % | TEMPERATURE: 97.8 F | BODY MASS INDEX: 31.78 KG/M2 | RESPIRATION RATE: 12 BRPM | HEIGHT: 71 IN

## 2020-08-25 DIAGNOSIS — L71.9 ROSACEA: ICD-10-CM

## 2020-08-25 DIAGNOSIS — Z00.00 HEALTHCARE MAINTENANCE: Primary | ICD-10-CM

## 2020-08-25 DIAGNOSIS — R73.01 IFG (IMPAIRED FASTING GLUCOSE): ICD-10-CM

## 2020-08-25 DIAGNOSIS — E55.9 AVITAMINOSIS D: ICD-10-CM

## 2020-08-25 DIAGNOSIS — I10 ESSENTIAL HYPERTENSION: ICD-10-CM

## 2020-08-25 DIAGNOSIS — M19.049 ARTHRITIS OF HAND: ICD-10-CM

## 2020-08-25 DIAGNOSIS — E66.9 OBESITY (BMI 30-39.9): ICD-10-CM

## 2020-08-25 DIAGNOSIS — E78.5 DYSLIPIDEMIA: ICD-10-CM

## 2020-08-25 DIAGNOSIS — K22.719 BARRETT'S ESOPHAGUS WITH DYSPLASIA: ICD-10-CM

## 2020-08-25 DIAGNOSIS — C44.92 SQUAMOUS CELL CARCINOMA OF SKIN: ICD-10-CM

## 2020-08-25 DIAGNOSIS — E83.110 HEREDITARY HEMOCHROMATOSIS (HCC): ICD-10-CM

## 2020-08-25 PROCEDURE — 99396 PREV VISIT EST AGE 40-64: CPT | Performed by: INTERNAL MEDICINE

## 2020-08-25 RX ORDER — ACETAMINOPHEN 500 MG
TABLET ORAL
Qty: 30 TABLET | Refills: 0
Start: 2020-08-25 | End: 2021-04-22

## 2020-08-25 NOTE — PROGRESS NOTES
"Annual Exam (review of medical issues)      HPI  Matias Hughes is a 50 y.o. male RTC in yearly CPE, review of medical issues:  Has been doing well. \"A lot of golf\".   1. HTN - new dx in 2019, on one drug.   hgas stopped checking at home, has cuff.   2. IFG/ Obeisty - has been eating better, less eating out.  Less fried foods.  Is active on golf course, thinks '60 rounds of golf' this year.   3. Hemachromatosis - phlebotomy ~ 4x/ year with goal ferritin < 50 per Dr. Noonan - sees Heme and had last phlebotomy about one month ago.  Overall stable at visits.    4. Reflux esophagitis/ Denis's Esophagus repeat  EGD 11/2018 --> repeat 3 years -  on 40 mg Omeprazole. No sx on meds overall.  Very rare breakthrough.  Constipation is better after started on fiber daily. Misses some days, just forgets sometimes.   5. Squamous Cell CA of skin ~2014 - sees derm q 6 months. No new lesions noted. Using sunscreen this year with golf.   6. Vitamin D deficiency - on 1000 I.U. Vitamin D3 OTC daily.     Review of Systems   Constitution: Positive for weight loss (intentional). Negative for chills, fever and malaise/fatigue.   HENT: Negative for congestion, hearing loss, odynophagia and sore throat.    Eyes: Negative for discharge, double vision, pain and redness.        Last eye exam 10/2019; wears glasses     Cardiovascular: Negative for chest pain, dyspnea on exertion, irregular heartbeat, near-syncope, palpitations and syncope.   Respiratory: Negative for cough and shortness of breath.    Endocrine: Negative for polydipsia, polyphagia and polyuria.   Hematologic/Lymphatic: Negative for bleeding problem. Does not bruise/bleed easily.   Skin: Positive for skin cancer (hx of). Negative for rash and suspicious lesions.   Musculoskeletal: Positive for arthritis (hands, mild sx; seen on X-ray in past). Negative for joint pain, joint swelling, muscle cramps, muscle weakness and myalgias.   Gastrointestinal: Positive for constipation " (improved). Negative for diarrhea, dysphagia, heartburn, hematochezia, melena, nausea and vomiting.   Genitourinary: Negative for dysuria, frequency, hematuria and hesitancy.   Neurological: Negative for dizziness, headaches and light-headedness.   Psychiatric/Behavioral: Negative for depression. The patient does not have insomnia and is not nervous/anxious.    Allergic/Immunologic: Negative for environmental allergies and persistent infections.       Problem List:    Patient Active Problem List   Diagnosis   • Hemochromatosis   • Squamous cell carcinoma of skin   • Avitaminosis D   • Rosacea   • Dyslipidemia   • IFG (impaired fasting glucose)   • Obesity (BMI 30-39.9)   • Fatty liver   • Ventral hernia without obstruction or gangrene   • Denis's esophagus with dysplasia   • Peptic ulcer disease   • Diverticulosis of large intestine without hemorrhage   • Upper back pain, chronic   • Essential hypertension       Medical History:    Past Medical History:   Diagnosis Date   • Acute prostatitis 10/11/2018    10/2018; on background of chronic prostatitis    • Diverticulosis of large intestine without hemorrhage 11/2/2018   • H/O hypogonadism    • Hemochromatosis     heterozygous for C282Y mutation hx elevated LFT's Ferritin elevated at dx   • History of cholelithiasis    • History of chronic prostatitis     2010 tx'd by Dr Cooper   • History of Clostridium difficile colitis     Hospitalized June 2013; with SBO   • History of small bowel obstruction     related to adhesions from gallbladder surgery adhesions; 2013; 2015; 2016   • Overweight    • Prehypertension    • Rosacea 6/2/2016   • Squamous cell carcinoma of skin     2012 sees derm q 6 months (Dr. Mitchell)   • Vitamin D deficiency         Social History:    Social History     Socioeconomic History   • Marital status:      Spouse name: Shante   • Number of children: 2   • Years of education: College   • Highest education level: Not on file   Occupational  "History   • Occupation: [employer]     Employer: [employer]   Tobacco Use   • Smoking status: Never Smoker   • Smokeless tobacco: Never Used   Substance and Sexual Activity   • Alcohol use: Yes     Comment: 3-5 drinks week   • Drug use: No   • Sexual activity: Yes     Partners: Female     Comment: wife only; no hx STD's   Lifestyle   • Physical activity:     Days per week: 0 days     Minutes per session: 0 min   • Stress: Not on file   Social History Narrative    Diet -  \"Could be better\"; too many sweets and red meat; betterin in 2020 with less eating out    Exercise - volleyball 2x/ week, using FitBit, goes to gym intermittently, off in 2019; golf in 2020    Caffeine - 3-4 cups coffee daily       Family History:   Family History   Problem Relation Age of Onset   • Hypothyroidism Mother    • Rheum arthritis Maternal Aunt    • Breast cancer Maternal Aunt    • Glaucoma Paternal Grandmother    • Breast cancer Maternal Grandmother    • Migraines Daughter    • Seizures Daughter         Epilepsy   • Migraines Son        Surgical History:   Past Surgical History:   Procedure Laterality Date   • GALLBLADDER SURGERY      2013 laparoscopically    • LAPAROSCOPIC LYSIS OF ADHESIONS      peritoneal 2014 after SBO   • SINUS SURGERY      2007 for persistent congestion ?? turbinate reduction   • TONSILLECTOMY      1978         Current Outpatient Medications:   •  cholecalciferol (VITAMIN D3) 1000 units tablet, Take 1 tablet by mouth Daily., Disp: 30 tablet, Rfl: 5  •  losartan (COZAAR) 50 MG tablet, Take 1 tablet by mouth Daily., Disp: 90 tablet, Rfl: 3  •  omeprazole (priLOSEC) 40 MG capsule, , Disp: , Rfl:   •  acetaminophen (TYLENOL) 500 MG tablet, 2 tabs PO Q 8 hours PRN, Disp: 30 tablet, Rfl: 0  •  psyllium (METAMUCIL) 58.6 % powder, Take  by mouth Daily., Disp: , Rfl: 12    Vitals:    08/25/20 1249   BP: 128/80   Pulse: 77   Resp: 12   Temp: 97.8 °F (36.6 °C)   SpO2: 98%     Body mass index is 31.66 kg/m².    Physical Exam "   Constitutional: He is oriented to person, place, and time. He appears well-developed and well-nourished. He is cooperative. No distress.   HENT:   Head: Normocephalic and atraumatic.   Right Ear: Hearing, tympanic membrane, external ear and ear canal normal.   Left Ear: Hearing, tympanic membrane, external ear and ear canal normal.   Nose: Nose normal.   Mouth/Throat: Uvula is midline, oropharynx is clear and moist and mucous membranes are normal. No oropharyngeal exudate.   Eyes: Pupils are equal, round, and reactive to light. Conjunctivae, EOM and lids are normal. Right eye exhibits no discharge. Left eye exhibits no discharge. No scleral icterus.   Neck: Normal range of motion and full passive range of motion without pain. Neck supple. Carotid bruit is not present. No thyroid mass and no thyromegaly present.   Cardiovascular: Normal rate, regular rhythm, S1 normal, S2 normal and normal heart sounds. Exam reveals no gallop and no friction rub.   No murmur heard.  Pulses:       Radial pulses are 2+ on the right side, and 2+ on the left side.        Dorsalis pedis pulses are 2+ on the right side, and 2+ on the left side.        Posterior tibial pulses are 2+ on the right side, and 2+ on the left side.   Pulmonary/Chest: Effort normal and breath sounds normal. No respiratory distress. He has no wheezes. He has no rhonchi. He has no rales.   Abdominal: Soft. Bowel sounds are normal. He exhibits no distension and no mass. There is no hepatosplenomegaly. There is no tenderness. There is no rebound and no guarding.   Genitourinary: Rectum normal and prostate normal. Prostate is not enlarged and not tender.   Musculoskeletal: Normal range of motion. He exhibits no edema.     Vascular Status -  His right foot exhibits normal foot vasculature  and no edema. His left foot exhibits normal foot vasculature  and no edema.  Skin Integrity  -  His right foot skin is intact.His left foot skin is intact..  Lymphadenopathy:     He  has no cervical adenopathy.     He has no axillary adenopathy.        Right: No inguinal adenopathy present.        Left: No inguinal adenopathy present.   Neurological: He is alert and oriented to person, place, and time. He has normal strength and normal reflexes. He displays no tremor. No cranial nerve deficit or sensory deficit. He exhibits normal muscle tone. Gait normal.   Skin: Skin is warm, dry and intact. No rash noted.   Psychiatric: He has a normal mood and affect. His speech is normal and behavior is normal. Thought content normal. Cognition and memory are normal.   Vitals reviewed.      Assessment/ Plan  Diagnoses and all orders for this visit:    Healthcare maintenance    Essential hypertension    Avitaminosis D    Denis's esophagus with dysplasia    Obesity (BMI 30-39.9)    Hereditary hemochromatosis (CMS/HCC)    IFG (impaired fasting glucose)    Rosacea    Squamous cell carcinoma of skin    Dyslipidemia  -     High Sensitivity CRP; Future  -     psyllium (METAMUCIL) 58.6 % powder; Take  by mouth Daily.    Arthritis of hand  -     acetaminophen (TYLENOL) 500 MG tablet; 2 tabs PO Q 8 hours PRN        Return in about 6 months (around 2/25/2021) for Recheck.      Discussion:  Matias Hughes is a 50 y.o. male RTC in yearly CPE, review of medical issues:   1. HTN - new dx in 2019, controlled on one drug. BMP OK.  2. IFG/ Obeisty - pt has lost a few pounds with modest diet mods.  Needs to add CV exercise in addition to golf(riding in cart).  Weight loss strongly advised. A1C OK today.   3. Hemachromatosis - phlebotomy ~ 4x/ year with goal ferritin < 50 per Dr. Noonan - F/U Heme annually, with labs q 3 months and phlebotomy PRN.   4. Reflux esophagitis/ Denis's Esophagus repeat  EGD 11/2018 --> repeat 3 years -  Controlled on 40 mg Omeprazole. Add fiber daily for mild constipation side effect with PPI.  5. Squamous Cell CA of skin ~2014 - sees derm q 6 months. No new lesions.   6. Rosacea - off minocycline  for years now. ROBERT.  7. Vitamin D deficiency - 1000 I.U. Vitamin D3 OTC daily. Trend next labs.   8. Dyslipidemia, low HDL - pt has goals of increased exercise, strongly encouraged. 10 yr CR 4.7%-8.9%.  Will add hsCRP to labs to further assess need for statin. Add CV exercise and change to daily fiber supplement.   9. Hand pain, mild OA hands - on daily NSAID. Reviewed risks with pt.  Change to Tylenol 1000mg daily in AM as baseline. NSAID only PRN.  10. HM - labs d/w pt; Flu/ Tdap/ Hep A/ Shingrix - UTD;  C-scope UTD 2011 --> 10/2017 --> 10 years; NAGI OK, discussed PSA and pt agrees to consider; Hep C Ab (-) 8/2020; add more exercise.     RTC 6 months, F labs prior (CMP, A1C, Vit D, LDL, HDL)

## 2020-08-26 LAB
CRP SERPL HS-MCNC: 2.94 MG/L (ref 0–3)
Lab: NORMAL
WRITTEN AUTHORIZATION: NORMAL

## 2020-08-30 ENCOUNTER — RESULTS ENCOUNTER (OUTPATIENT)
Dept: INTERNAL MEDICINE | Facility: CLINIC | Age: 50
End: 2020-08-30

## 2020-08-30 DIAGNOSIS — E78.5 DYSLIPIDEMIA: ICD-10-CM

## 2020-10-09 ENCOUNTER — RESULTS ENCOUNTER (OUTPATIENT)
Dept: ONCOLOGY | Facility: CLINIC | Age: 50
End: 2020-10-09

## 2020-10-09 DIAGNOSIS — E83.110 HEREDITARY HEMOCHROMATOSIS (HCC): ICD-10-CM

## 2020-11-03 ENCOUNTER — LAB (OUTPATIENT)
Dept: OTHER | Facility: HOSPITAL | Age: 50
End: 2020-11-03

## 2020-11-03 ENCOUNTER — INFUSION (OUTPATIENT)
Dept: ONCOLOGY | Facility: HOSPITAL | Age: 50
End: 2020-11-03

## 2020-11-03 VITALS
BODY MASS INDEX: 32.65 KG/M2 | OXYGEN SATURATION: 99 % | DIASTOLIC BLOOD PRESSURE: 85 MMHG | HEART RATE: 78 BPM | SYSTOLIC BLOOD PRESSURE: 125 MMHG | WEIGHT: 233.2 LBS | HEIGHT: 71 IN | TEMPERATURE: 97.5 F | RESPIRATION RATE: 18 BRPM

## 2020-11-03 DIAGNOSIS — E83.110 HEREDITARY HEMOCHROMATOSIS (HCC): Primary | ICD-10-CM

## 2020-11-03 DIAGNOSIS — E83.110 HEREDITARY HEMOCHROMATOSIS (HCC): ICD-10-CM

## 2020-11-03 LAB
BASOPHILS # BLD AUTO: 0.02 10*3/MM3 (ref 0–0.2)
BASOPHILS NFR BLD AUTO: 0.4 % (ref 0–1.5)
DEPRECATED RDW RBC AUTO: 45.8 FL (ref 37–54)
EOSINOPHIL # BLD AUTO: 0.15 10*3/MM3 (ref 0–0.4)
EOSINOPHIL NFR BLD AUTO: 3.1 % (ref 0.3–6.2)
ERYTHROCYTE [DISTWIDTH] IN BLOOD BY AUTOMATED COUNT: 13.9 % (ref 12.3–15.4)
FERRITIN SERPL-MCNC: 72.3 NG/ML (ref 30–400)
HCT VFR BLD AUTO: 42.9 % (ref 37.5–51)
HGB BLD-MCNC: 14.9 G/DL (ref 13–17.7)
IMM GRANULOCYTES # BLD AUTO: 0.01 10*3/MM3 (ref 0–0.05)
IMM GRANULOCYTES NFR BLD AUTO: 0.2 % (ref 0–0.5)
LYMPHOCYTES # BLD AUTO: 1.69 10*3/MM3 (ref 0.7–3.1)
LYMPHOCYTES NFR BLD AUTO: 35.3 % (ref 19.6–45.3)
MCH RBC QN AUTO: 30.9 PG (ref 26.6–33)
MCHC RBC AUTO-ENTMCNC: 34.7 G/DL (ref 31.5–35.7)
MCV RBC AUTO: 89 FL (ref 79–97)
MONOCYTES # BLD AUTO: 0.34 10*3/MM3 (ref 0.1–0.9)
MONOCYTES NFR BLD AUTO: 7.1 % (ref 5–12)
NEUTROPHILS NFR BLD AUTO: 2.58 10*3/MM3 (ref 1.7–7)
NEUTROPHILS NFR BLD AUTO: 53.9 % (ref 42.7–76)
NRBC BLD AUTO-RTO: 0 /100 WBC (ref 0–0.2)
PLATELET # BLD AUTO: 171 10*3/MM3 (ref 140–450)
PMV BLD AUTO: 9.8 FL (ref 6–12)
RBC # BLD AUTO: 4.82 10*6/MM3 (ref 4.14–5.8)
WBC # BLD AUTO: 4.79 10*3/MM3 (ref 3.4–10.8)

## 2020-11-03 PROCEDURE — 99195 PHLEBOTOMY: CPT

## 2020-11-03 PROCEDURE — 85025 COMPLETE CBC W/AUTO DIFF WBC: CPT | Performed by: INTERNAL MEDICINE

## 2020-11-03 PROCEDURE — 36415 COLL VENOUS BLD VENIPUNCTURE: CPT

## 2020-11-03 PROCEDURE — 82728 ASSAY OF FERRITIN: CPT | Performed by: INTERNAL MEDICINE

## 2020-11-03 RX ORDER — SODIUM CHLORIDE 9 MG/ML
250 INJECTION, SOLUTION INTRAVENOUS ONCE
Status: CANCELLED | OUTPATIENT
Start: 2020-11-10

## 2021-01-26 ENCOUNTER — LAB (OUTPATIENT)
Dept: OTHER | Facility: HOSPITAL | Age: 51
End: 2021-01-26

## 2021-01-26 DIAGNOSIS — E83.110 HEREDITARY HEMOCHROMATOSIS (HCC): ICD-10-CM

## 2021-01-26 LAB
BASOPHILS # BLD AUTO: 0.03 10*3/MM3 (ref 0–0.2)
BASOPHILS NFR BLD AUTO: 0.5 % (ref 0–1.5)
DEPRECATED RDW RBC AUTO: 44.1 FL (ref 37–54)
EOSINOPHIL # BLD AUTO: 0.06 10*3/MM3 (ref 0–0.4)
EOSINOPHIL NFR BLD AUTO: 1.1 % (ref 0.3–6.2)
ERYTHROCYTE [DISTWIDTH] IN BLOOD BY AUTOMATED COUNT: 13.7 % (ref 12.3–15.4)
FERRITIN SERPL-MCNC: 66.5 NG/ML (ref 30–400)
HCT VFR BLD AUTO: 43.2 % (ref 37.5–51)
HGB BLD-MCNC: 14.6 G/DL (ref 13–17.7)
IMM GRANULOCYTES # BLD AUTO: 0.01 10*3/MM3 (ref 0–0.05)
IMM GRANULOCYTES NFR BLD AUTO: 0.2 % (ref 0–0.5)
LYMPHOCYTES # BLD AUTO: 1.79 10*3/MM3 (ref 0.7–3.1)
LYMPHOCYTES NFR BLD AUTO: 31.8 % (ref 19.6–45.3)
MCH RBC QN AUTO: 29.8 PG (ref 26.6–33)
MCHC RBC AUTO-ENTMCNC: 33.8 G/DL (ref 31.5–35.7)
MCV RBC AUTO: 88.2 FL (ref 79–97)
MONOCYTES # BLD AUTO: 0.4 10*3/MM3 (ref 0.1–0.9)
MONOCYTES NFR BLD AUTO: 7.1 % (ref 5–12)
NEUTROPHILS NFR BLD AUTO: 3.34 10*3/MM3 (ref 1.7–7)
NEUTROPHILS NFR BLD AUTO: 59.3 % (ref 42.7–76)
NRBC BLD AUTO-RTO: 0 /100 WBC (ref 0–0.2)
PLATELET # BLD AUTO: 168 10*3/MM3 (ref 140–450)
PMV BLD AUTO: 9.7 FL (ref 6–12)
RBC # BLD AUTO: 4.9 10*6/MM3 (ref 4.14–5.8)
WBC # BLD AUTO: 5.63 10*3/MM3 (ref 3.4–10.8)

## 2021-01-26 PROCEDURE — 82728 ASSAY OF FERRITIN: CPT | Performed by: INTERNAL MEDICINE

## 2021-01-26 PROCEDURE — 36415 COLL VENOUS BLD VENIPUNCTURE: CPT

## 2021-01-26 PROCEDURE — 85025 COMPLETE CBC W/AUTO DIFF WBC: CPT | Performed by: INTERNAL MEDICINE

## 2021-02-02 ENCOUNTER — INFUSION (OUTPATIENT)
Dept: ONCOLOGY | Facility: HOSPITAL | Age: 51
End: 2021-02-02

## 2021-02-02 ENCOUNTER — OFFICE VISIT (OUTPATIENT)
Dept: ONCOLOGY | Facility: CLINIC | Age: 51
End: 2021-02-02

## 2021-02-02 ENCOUNTER — APPOINTMENT (OUTPATIENT)
Dept: OTHER | Facility: HOSPITAL | Age: 51
End: 2021-02-02

## 2021-02-02 VITALS
HEIGHT: 71 IN | DIASTOLIC BLOOD PRESSURE: 88 MMHG | HEART RATE: 92 BPM | WEIGHT: 235.1 LBS | SYSTOLIC BLOOD PRESSURE: 134 MMHG | OXYGEN SATURATION: 96 % | RESPIRATION RATE: 12 BRPM | TEMPERATURE: 97.7 F | BODY MASS INDEX: 32.91 KG/M2

## 2021-02-02 VITALS — SYSTOLIC BLOOD PRESSURE: 116 MMHG | DIASTOLIC BLOOD PRESSURE: 76 MMHG

## 2021-02-02 DIAGNOSIS — E66.9 OBESITY (BMI 30-39.9): ICD-10-CM

## 2021-02-02 DIAGNOSIS — E55.9 AVITAMINOSIS D: ICD-10-CM

## 2021-02-02 DIAGNOSIS — E83.110 HEREDITARY HEMOCHROMATOSIS (HCC): Primary | ICD-10-CM

## 2021-02-02 DIAGNOSIS — R73.01 IFG (IMPAIRED FASTING GLUCOSE): Primary | ICD-10-CM

## 2021-02-02 DIAGNOSIS — K76.0 FATTY LIVER: ICD-10-CM

## 2021-02-02 DIAGNOSIS — I10 ESSENTIAL HYPERTENSION: ICD-10-CM

## 2021-02-02 PROCEDURE — 99213 OFFICE O/P EST LOW 20 MIN: CPT | Performed by: INTERNAL MEDICINE

## 2021-02-02 PROCEDURE — 99195 PHLEBOTOMY: CPT

## 2021-02-02 RX ORDER — SODIUM CHLORIDE 9 MG/ML
250 INJECTION, SOLUTION INTRAVENOUS ONCE
Status: CANCELLED | OUTPATIENT
Start: 2021-02-09

## 2021-02-02 RX ORDER — SODIUM CHLORIDE 9 MG/ML
250 INJECTION, SOLUTION INTRAVENOUS ONCE
Status: COMPLETED | OUTPATIENT
Start: 2021-02-02 | End: 2021-02-02

## 2021-02-02 RX ADMIN — SODIUM CHLORIDE 250 ML: 900 INJECTION, SOLUTION INTRAVENOUS at 16:20

## 2021-02-02 NOTE — PROGRESS NOTES
Subjective   REASON FOR FOLLOWUP: Hereditary hemochromatosis, undergoing phlebotomy with the goal of getting his ferritin around 50.     HISTORY OF PRESENT ILLNESS:   Mr. Hughes is a pleasant 50 y.o. gentleman with hereditary hemochromatosis, undergoing therapeutic phlebotomy to try to keep his ferritin below 100 and closer to 50, if possible. He has been having his ferritin drawn every 2-3 months.    He looks great in the office today.  His ferritin level from 1/26/2021 was 66 therefore we will proceed with phlebotomy in the office today and continue checking his labs every 3 months.      History of Present Illness      Past Medical History, Past Surgical History, Social History, Family History have been reviewed and are without significant changes except as mentioned.    Review of Systems   Constitutional: Negative for activity change, appetite change, fatigue, fever and unexpected weight change.   HENT: Negative for hearing loss, nosebleeds, trouble swallowing and voice change.    Eyes: Negative for visual disturbance.   Respiratory: Negative for cough, shortness of breath and wheezing.    Cardiovascular: Negative for chest pain and palpitations.   Gastrointestinal: Negative for abdominal pain, diarrhea, nausea and vomiting.   Genitourinary: Negative for difficulty urinating, frequency, hematuria and urgency.   Musculoskeletal: Negative for back pain and neck pain.   Skin: Negative for rash.   Neurological: Negative for dizziness, seizures, syncope and headaches.   Hematological: Negative for adenopathy. Does not bruise/bleed easily.   Psychiatric/Behavioral: Negative for behavioral problems. The patient is not nervous/anxious.       A comprehensive 14 point review of systems was performed and was negative except as mentioned.    Medications:  The current medication list was reviewed in the EMR    ALLERGIES:    Allergies   Allergen Reactions   • Cefdinir Unknown - Low Severity       Objective      Vitals:     "02/02/21 1509   BP: 134/88   Pulse: 92   Resp: 12   Temp: 97.7 °F (36.5 °C)   TempSrc: Infrared   SpO2: 96%   Weight: 107 kg (235 lb 1.6 oz)   Height: 180.3 cm (70.98\")   PainSc:   2   PainLoc: Head  Comment: headache     Current Status 2/2/2021   ECOG score 0       Physical Exam   Constitutional: He is oriented to person, place, and time. He appears well-developed. No distress.   HENT:   Head: Normocephalic.   Eyes: Pupils are equal, round, and reactive to light. Conjunctivae are normal. No scleral icterus.   Neck: Normal range of motion. Neck supple. No JVD present. No thyromegaly present.   Cardiovascular: Normal rate and regular rhythm. Exam reveals no gallop and no friction rub.   No murmur heard.  Pulmonary/Chest: Effort normal and breath sounds normal. He has no wheezes. He has no rales.   Abdominal: Soft. He exhibits no distension and no mass. There is no abdominal tenderness.   Musculoskeletal: Normal range of motion. No deformity.   Lymphadenopathy:     He has no cervical adenopathy.   Neurological: He is alert and oriented to person, place, and time. He has normal reflexes. No cranial nerve deficit.   Skin: Skin is warm and dry. No rash noted. No erythema.   Psychiatric: His behavior is normal. Judgment normal.         RECENT LABS:  Hematology WBC   Date Value Ref Range Status   01/26/2021 5.63 3.40 - 10.80 10*3/mm3 Final   08/20/2020 4.85 3.40 - 10.80 10*3/mm3 Final     RBC   Date Value Ref Range Status   01/26/2021 4.90 4.14 - 5.80 10*6/mm3 Final   08/20/2020 4.88 4.14 - 5.80 10*6/mm3 Final     Hemoglobin   Date Value Ref Range Status   01/26/2021 14.6 13.0 - 17.7 g/dL Final     Hematocrit   Date Value Ref Range Status   01/26/2021 43.2 37.5 - 51.0 % Final     Platelets   Date Value Ref Range Status   01/26/2021 168 140 - 450 10*3/mm3 Final            Lab Results   Component Value Date    FERRITIN 66.50 01/26/2021       Assessment/Plan     ASSESSMENT:   1. Hemochromatosis, undergoing therapeutic " phlebotomy with the goal of keeping his ferritin around 50.   2. Intermittent mild thrombocytopenia, most likely due to chronic ITP. Platelets were normal on his labs last week.          PLAN:   1. Mr. Hughes will undergo phleboto my 500 cc in the office today along with 250 cc saline hydration.   2. He will be returning every 3 months for lab with R.N. review and phlebotomy p.r.n.   3. He will see a physician again in the office in 12 months.  Labs will be drawn one week prior to that visit so that he can undergo phlebotomy on the day of the M.D. visit if his ferritin is greater than 50.                2/2/2021      CC:

## 2021-02-10 LAB
25(OH)D3+25(OH)D2 SERPL-MCNC: 26 NG/ML (ref 30–100)
ALBUMIN SERPL-MCNC: 4.7 G/DL (ref 3.5–5.2)
ALBUMIN/GLOB SERPL: 2 G/DL
ALP SERPL-CCNC: 63 U/L (ref 39–117)
ALT SERPL-CCNC: 36 U/L (ref 1–41)
AST SERPL-CCNC: 26 U/L (ref 1–40)
BILIRUB SERPL-MCNC: 0.6 MG/DL (ref 0–1.2)
BUN SERPL-MCNC: 15 MG/DL (ref 6–20)
BUN/CREAT SERPL: 15 (ref 7–25)
CALCIUM SERPL-MCNC: 9 MG/DL (ref 8.6–10.5)
CHLORIDE SERPL-SCNC: 101 MMOL/L (ref 98–107)
CO2 SERPL-SCNC: 26.9 MMOL/L (ref 22–29)
CREAT SERPL-MCNC: 1 MG/DL (ref 0.76–1.27)
GLOBULIN SER CALC-MCNC: 2.4 GM/DL
GLUCOSE SERPL-MCNC: 129 MG/DL (ref 65–99)
HBA1C MFR BLD: 5 % (ref 4.8–5.6)
HDLC SERPL-MCNC: 39 MG/DL (ref 40–60)
LDLC SERPL DIRECT ASSAY-MCNC: 106 MG/DL (ref 0–100)
POTASSIUM SERPL-SCNC: 4.2 MMOL/L (ref 3.5–5.2)
PROT SERPL-MCNC: 7.1 G/DL (ref 6–8.5)
SODIUM SERPL-SCNC: 136 MMOL/L (ref 136–145)

## 2021-03-01 ENCOUNTER — OFFICE VISIT (OUTPATIENT)
Dept: INTERNAL MEDICINE | Facility: CLINIC | Age: 51
End: 2021-03-01

## 2021-03-01 VITALS
BODY MASS INDEX: 33.36 KG/M2 | OXYGEN SATURATION: 98 % | SYSTOLIC BLOOD PRESSURE: 130 MMHG | TEMPERATURE: 97.1 F | HEART RATE: 100 BPM | DIASTOLIC BLOOD PRESSURE: 80 MMHG | WEIGHT: 233 LBS | HEIGHT: 70 IN

## 2021-03-01 DIAGNOSIS — E55.9 AVITAMINOSIS D: ICD-10-CM

## 2021-03-01 DIAGNOSIS — R73.01 IFG (IMPAIRED FASTING GLUCOSE): ICD-10-CM

## 2021-03-01 DIAGNOSIS — E66.9 OBESITY (BMI 30-39.9): ICD-10-CM

## 2021-03-01 DIAGNOSIS — K22.719 BARRETT'S ESOPHAGUS WITH DYSPLASIA: ICD-10-CM

## 2021-03-01 DIAGNOSIS — I10 ESSENTIAL HYPERTENSION: Primary | ICD-10-CM

## 2021-03-01 DIAGNOSIS — E83.110 HEREDITARY HEMOCHROMATOSIS (HCC): ICD-10-CM

## 2021-03-01 DIAGNOSIS — E78.5 DYSLIPIDEMIA: ICD-10-CM

## 2021-03-01 PROCEDURE — 99214 OFFICE O/P EST MOD 30 MIN: CPT | Performed by: INTERNAL MEDICINE

## 2021-03-01 RX ORDER — OMEPRAZOLE 40 MG/1
40 CAPSULE, DELAYED RELEASE ORAL DAILY
Qty: 90 CAPSULE | Refills: 3 | Status: SHIPPED | OUTPATIENT
Start: 2021-03-01 | End: 2022-03-30 | Stop reason: SDUPTHER

## 2021-03-01 NOTE — PROGRESS NOTES
"Hypertension      HPI  Matias Hughes is a 50 y.o. male RTC in f/u:  Was in [place] last week, good weather.    1. HTN - new dx in 2019, on one drug. No home log.  Gets checked at Heme office several times/ year, numbers 120's/ 80's.  2. IFG/ Obesity - not much exercise here, 'need to up that a little bit'.   Did play some golf and walk the beach. Weight has been stable.  Diet is 'about the same'.   3. Hemachromatosis - phlebotomy ~ 4x/ year with goal ferritin < 50 per Dr. Noonan - saw Heme 2 weeks ago.  Ferritin was 70 and goal is < 50.   Really has had minimal elevation when gets checked. \"I cant remember the last time it was above 100\".   4. Reflux esophagitis/ Denis's Esophagus repeat  EGD 11/2018 --> repeat 3 years -  Controlled on 40 mg Omeprazole.  No issues swallowing.  gKnows needs scope 11/2021; used to see Dr. Meehan.  Needs refill on PPI.  5. Vitamin D deficiency - is not taking pill daily.  \"I need to take that home (from the office) and put it with the rest of my pills\".       Review of Systems   Constitution: Negative for chills, fever and weight loss.   HENT: Positive for tinnitus (L ear, off and on, recently). Negative for hearing loss, odynophagia and sore throat.    Cardiovascular: Negative for chest pain, dyspnea on exertion, irregular heartbeat and palpitations.   Gastrointestinal: Negative for dysphagia, heartburn, nausea and vomiting.   Neurological: Negative for dizziness and light-headedness.       The following portions of the patient's history were reviewed and updated as appropriate: allergies, current medications, past medical history, past social history and problem list.      Current Outpatient Medications:   •  acetaminophen (TYLENOL) 500 MG tablet, 2 tabs PO Q 8 hours PRN, Disp: 30 tablet, Rfl: 0  •  cholecalciferol (VITAMIN D3) 1000 units tablet, Take 1 tablet by mouth Daily., Disp: 30 tablet, Rfl: 5  •  losartan (COZAAR) 50 MG tablet, Take 1 tablet by mouth Daily., Disp: 90 tablet, " "Rfl: 3  •  omeprazole (priLOSEC) 40 MG capsule, Take 1 capsule by mouth Daily., Disp: 90 capsule, Rfl: 3  •  psyllium (METAMUCIL) 58.6 % powder, Take  by mouth Daily., Disp: , Rfl: 12    Vitals:    03/01/21 1426   BP: 130/80   Pulse: 100   Temp: 97.1 °F (36.2 °C)   SpO2: 98%   Weight: 106 kg (233 lb)   Height: 177.8 cm (70\")     Body mass index is 33.43 kg/m².      Physical Exam  Vitals signs reviewed.   Constitutional:       General: He is not in acute distress.     Appearance: He is well-developed. He is not ill-appearing or toxic-appearing.   HENT:      Head: Normocephalic and atraumatic.      Right Ear: Tympanic membrane, ear canal and external ear normal.      Left Ear: Tympanic membrane, ear canal and external ear normal.      Mouth/Throat:      Mouth: No oral lesions.      Tongue: No lesions.      Pharynx: No pharyngeal swelling or uvula swelling.   Eyes:      General: No scleral icterus.     Conjunctiva/sclera: Conjunctivae normal.      Pupils: Pupils are equal, round, and reactive to light.   Neck:      Musculoskeletal: Normal range of motion and neck supple. No muscular tenderness.      Vascular: No carotid bruit.   Cardiovascular:      Rate and Rhythm: Normal rate and regular rhythm.      Pulses:           Carotid pulses are 2+ on the right side and 2+ on the left side.       Radial pulses are 2+ on the right side and 2+ on the left side.      Heart sounds: Normal heart sounds.   Pulmonary:      Effort: Pulmonary effort is normal. No respiratory distress.      Breath sounds: Normal breath sounds. No wheezing, rhonchi or rales.   Musculoskeletal:      Right lower leg: No edema.      Left lower leg: No edema.   Lymphadenopathy:      Cervical: No cervical adenopathy.   Neurological:      Mental Status: He is alert and oriented to person, place, and time.      Cranial Nerves: No cranial nerve deficit.      Gait: Gait normal.   Psychiatric:         Attention and Perception: Attention normal.         Mood and " Affect: Mood and affect normal.         Behavior: Behavior normal.         Thought Content: Thought content normal.         Assessment/ Plan  Diagnoses and all orders for this visit:    Essential hypertension    Denis's esophagus with dysplasia  -     omeprazole (priLOSEC) 40 MG capsule; Take 1 capsule by mouth Daily.  -     Ambulatory Referral to Gastroenterology    IFG (impaired fasting glucose)    Obesity (BMI 30-39.9)    Avitaminosis D    Hereditary hemochromatosis (CMS/HCC)    Dyslipidemia        Return in about 6 months (around 9/1/2021) for Annual physical.      Discussion:  Matias Hughes is a 50 y.o. male RTC in f/u:    1. HTN - new dx in 2019, controlled on one drug. BMP OK.  2. IFG/ Obesity - persistent issue, weight is stable. Needs more exercise and diet mods for weight loss. A1C remains controlled.   3. Hemachromatosis - phlebotomy ~ 4x/ year with goal ferritin < 50 per Dr. Noonan - saw Heme 2 weeks ago.  F/U Heme annually, with labs q 3 months and phlebotomy PRN.   4. Reflux esophagitis/ Denis's Esophagus repeat  EGD 11/2018 --> repeat 3 years -  Controlled on 40 mg Omeprazole. No red flag sx. Refilled today. Referred to Dr. Meehan to establish care in [facility] for planned 11/2021 EGD (saw Dr. Meehan as prior practice).   5. Vitamin D deficiency - non compliance with repletion.  Add back 1000 I.U. Vitamin D3 OTC daily. Trend next labs.   6. Dyslipidemia, low HDL -. 10 yr CR 4.7%-8.9%; hsCRP average on last labs.  Numbers largely unchanged today mirroring lack of change in TLC mods for pt. Urged pt to loose weight with TLC diet and increase in exercise.   Trend numbers with 10yr CR next labs.     RTC 6 months CPE, F labs prior

## 2021-03-10 DIAGNOSIS — I10 ESSENTIAL HYPERTENSION: ICD-10-CM

## 2021-03-10 RX ORDER — LOSARTAN POTASSIUM 50 MG/1
TABLET ORAL
Qty: 90 TABLET | Refills: 2 | Status: SHIPPED | OUTPATIENT
Start: 2021-03-10 | End: 2021-09-03

## 2021-03-26 ENCOUNTER — BULK ORDERING (OUTPATIENT)
Dept: CASE MANAGEMENT | Facility: OTHER | Age: 51
End: 2021-03-26

## 2021-03-26 DIAGNOSIS — Z23 IMMUNIZATION DUE: ICD-10-CM

## 2021-04-20 ENCOUNTER — TELEPHONE (OUTPATIENT)
Dept: ONCOLOGY | Facility: CLINIC | Age: 51
End: 2021-04-20

## 2021-04-20 NOTE — TELEPHONE ENCOUNTER
Pt returned call requesting that a CBC be drawn at his apt next week.  Assured patient that he will have a CBC drawn next week at his apt.  Pt v/u.

## 2021-04-21 ENCOUNTER — TELEPHONE (OUTPATIENT)
Dept: GASTROENTEROLOGY | Facility: CLINIC | Age: 51
End: 2021-04-21

## 2021-04-21 ENCOUNTER — TELEPHONE (OUTPATIENT)
Dept: INTERNAL MEDICINE | Facility: CLINIC | Age: 51
End: 2021-04-21

## 2021-04-21 NOTE — TELEPHONE ENCOUNTER
Caller: Matias Hughes    Relationship to patient: Self    Best call back number: [phone]    New or established patient?  [] New  [x] Established    Date of discharge: 4/16/21    Facility discharged from: [facility]    Diagnosis/Symptoms: NA    Length of stay (If applicable): 4/13/21-4/16/21    Specialty Only: Did you see a [employer] health provider?    [] Yes  [x] No  If so, who?

## 2021-04-21 NOTE — TELEPHONE ENCOUNTER
Patient called stating he was seen at [facility] on 4/16/21 with a small bowel obstruction. Patient does not have any energy and no appetitive, with light colored stools. Patient would like to be seen earlier than his 5/19/21 appointment with Zoran. Please advise.

## 2021-04-22 ENCOUNTER — OFFICE VISIT (OUTPATIENT)
Dept: INTERNAL MEDICINE | Facility: CLINIC | Age: 51
End: 2021-04-22

## 2021-04-22 VITALS
WEIGHT: 223 LBS | HEIGHT: 70 IN | OXYGEN SATURATION: 98 % | TEMPERATURE: 96.4 F | HEART RATE: 102 BPM | BODY MASS INDEX: 31.92 KG/M2 | DIASTOLIC BLOOD PRESSURE: 88 MMHG | SYSTOLIC BLOOD PRESSURE: 120 MMHG

## 2021-04-22 DIAGNOSIS — K56.609 SMALL BOWEL OBSTRUCTION (HCC): Primary | ICD-10-CM

## 2021-04-22 DIAGNOSIS — R19.4 BOWEL HABIT CHANGES: ICD-10-CM

## 2021-04-22 PROCEDURE — 99213 OFFICE O/P EST LOW 20 MIN: CPT | Performed by: INTERNAL MEDICINE

## 2021-04-22 NOTE — PROGRESS NOTES
"hospital f/u (bowel obstruction)      HPI  Matias Hughes is a 50 y.o. male RTC after recetn in pt stay.  Notes that had fever and fatigue about 10 days ago and was present the next day.  Went to ER and told had SBO and was admitted for NPO and bowel rest.   Was discharged 5 days ago. Feels rex has 'not bounced back' like has in past.  Appetite is still down.  Stools are still water and 'not formed and light in color, almost white like'.  Will have occasional jackie.   No EGD in pt. Really not much of anything inpt, just bedrest and IVF's.   Last time had this was in 2016.  Did not have stool sx like does now.   Not seen GI yet.  Has appt in 5/2021. Will see Dr. Meehan.  GI did not see pt in the hospital.   Diet now is standard, eating meat, carbs, vegetables.  Fairly typical, but no raw or hard items.   No more F/C.  Has some mild discomfort now in abdomen on L side.  \"I would not call it a pain\".      Review of Systems   Constitutional: Positive for decreased appetite and malaise/fatigue. Negative for chills and fever.   Cardiovascular: Negative for chest pain, dyspnea on exertion, irregular heartbeat, leg swelling and palpitations.        No concern for blood clot per pt. Pt notes was on Lovenox inpt.      Respiratory: Negative for shortness of breath.    Skin: Positive for rash (itchy bumps on spine inpt, feels like is gone. No longer itching. ). Negative for suspicious lesions.   Gastrointestinal: Negative for abdominal pain (resolved largely), nausea and vomiting.       The following portions of the patient's history were reviewed and updated as appropriate: allergies, current medications, past medical history, past social history and problem list.      Current Outpatient Medications:   •  cholecalciferol (VITAMIN D3) 1000 units tablet, Take 1 tablet by mouth Daily., Disp: 30 tablet, Rfl: 5  •  losartan (COZAAR) 50 MG tablet, TAKE ONE TABLET BY MOUTH DAILY, Disp: 90 tablet, Rfl: 2  •  omeprazole (priLOSEC) 40 " "MG capsule, Take 1 capsule by mouth Daily., Disp: 90 capsule, Rfl: 3  •  psyllium (METAMUCIL) 58.6 % powder, Take  by mouth Daily., Disp: , Rfl: 12    Vitals:    04/22/21 0944   BP: 120/88   Pulse: 102   Temp: 96.4 °F (35.8 °C)   SpO2: 98%   Weight: 101 kg (223 lb)   Height: 177.8 cm (70\")     Body mass index is 32 kg/m².      Physical Exam  Vitals reviewed.   Constitutional:       General: He is not in acute distress.     Appearance: He is well-developed. He is not ill-appearing or toxic-appearing.   HENT:      Head: Normocephalic and atraumatic.      Mouth/Throat:      Mouth: Mucous membranes are moist. No oral lesions.      Tongue: No lesions.      Pharynx: No pharyngeal swelling, oropharyngeal exudate, posterior oropharyngeal erythema or uvula swelling.   Eyes:      General: No scleral icterus.     Conjunctiva/sclera: Conjunctivae normal.      Pupils: Pupils are equal, round, and reactive to light.   Neck:      Vascular: No carotid bruit.   Cardiovascular:      Rate and Rhythm: Normal rate and regular rhythm.      Pulses:           Carotid pulses are 2+ on the right side and 2+ on the left side.       Radial pulses are 2+ on the right side and 2+ on the left side.      Heart sounds: Normal heart sounds.   Pulmonary:      Effort: Pulmonary effort is normal. No respiratory distress.      Breath sounds: Normal breath sounds. No wheezing, rhonchi or rales.   Abdominal:      General: Abdomen is flat. Bowel sounds are normal. There is no distension.      Palpations: Abdomen is soft. There is no mass.      Tenderness: There is abdominal tenderness (very mild) in the periumbilical area. There is no right CVA tenderness, left CVA tenderness, guarding or rebound. Negative signs include Griffiths's sign.      Hernia: No hernia is present.      Comments: Normoactive bowel sounds in all four quadrants.    Musculoskeletal:      Cervical back: Normal range of motion and neck supple. No muscular tenderness.   Lymphadenopathy:      " Cervical: No cervical adenopathy.   Neurological:      Mental Status: He is alert and oriented to person, place, and time.      Cranial Nerves: No cranial nerve deficit.      Gait: Gait normal.   Psychiatric:         Attention and Perception: Attention normal.         Mood and Affect: Mood and affect normal.         Behavior: Behavior normal.         Thought Content: Thought content normal.         Assessment/ Plan  Diagnoses and all orders for this visit:    Small bowel obstruction (CMS/HCC)    Bowel habit changes        Return for Next scheduled follow up.      Discussion:  Matias Hughes is a 50 y.o. male with hx of SBO related to adhesions from gallbladder surgery adhesions; 2013; 2015; 2016 RTC in hospital follow-up after recent inpt stay at [facility] for recurrent SBO 4/13-4/16/21.  Pt tolerating diet and feeling better but admits that energy and stools are not back to normal. Exam is benign today, bowel sounds are active in all four quadrants. Reviwed 4/13/21 CT showing 'Mild small bowel dilatation involving the jejunum with a relative transition point in the mid abdomen suggestive of a low-grade, partial small bowel obstruction, particularly related to adhesive disease, with fluid and gas noted distally'.  I suspect stool changes are due to persistent, resolving small bowel edema and inflammation. Pt is already on daily fiber and continues.  I reassured pt today overall and will monitor over weekend with bland diet and good hydration.  Call if not better. Of note, pt has plans to see GI in 5/2021 for Denis's f/u, but will contact sooner if sx do not fully resolve.     RTC as planned.

## 2021-04-27 ENCOUNTER — INFUSION (OUTPATIENT)
Dept: ONCOLOGY | Facility: HOSPITAL | Age: 51
End: 2021-04-27

## 2021-04-27 ENCOUNTER — LAB (OUTPATIENT)
Dept: OTHER | Facility: HOSPITAL | Age: 51
End: 2021-04-27

## 2021-04-27 VITALS
DIASTOLIC BLOOD PRESSURE: 79 MMHG | SYSTOLIC BLOOD PRESSURE: 116 MMHG | RESPIRATION RATE: 18 BRPM | TEMPERATURE: 97.1 F | WEIGHT: 225 LBS | HEIGHT: 71 IN | OXYGEN SATURATION: 97 % | BODY MASS INDEX: 31.5 KG/M2 | HEART RATE: 80 BPM

## 2021-04-27 DIAGNOSIS — E83.110 HEREDITARY HEMOCHROMATOSIS (HCC): Primary | ICD-10-CM

## 2021-04-27 DIAGNOSIS — E83.110 HEREDITARY HEMOCHROMATOSIS (HCC): ICD-10-CM

## 2021-04-27 LAB
BASOPHILS # BLD AUTO: 0.03 10*3/MM3 (ref 0–0.2)
BASOPHILS NFR BLD AUTO: 0.8 % (ref 0–1.5)
DEPRECATED RDW RBC AUTO: 43.9 FL (ref 37–54)
EOSINOPHIL # BLD AUTO: 0.08 10*3/MM3 (ref 0–0.4)
EOSINOPHIL NFR BLD AUTO: 2.1 % (ref 0.3–6.2)
ERYTHROCYTE [DISTWIDTH] IN BLOOD BY AUTOMATED COUNT: 14.4 % (ref 12.3–15.4)
FERRITIN SERPL-MCNC: 92.4 NG/ML (ref 30–400)
HCT VFR BLD AUTO: 39.1 % (ref 37.5–51)
HGB BLD-MCNC: 13.5 G/DL (ref 13–17.7)
IMM GRANULOCYTES # BLD AUTO: 0.02 10*3/MM3 (ref 0–0.05)
IMM GRANULOCYTES NFR BLD AUTO: 0.5 % (ref 0–0.5)
LYMPHOCYTES # BLD AUTO: 1.32 10*3/MM3 (ref 0.7–3.1)
LYMPHOCYTES NFR BLD AUTO: 34.8 % (ref 19.6–45.3)
MCH RBC QN AUTO: 29.4 PG (ref 26.6–33)
MCHC RBC AUTO-ENTMCNC: 34.5 G/DL (ref 31.5–35.7)
MCV RBC AUTO: 85.2 FL (ref 79–97)
MONOCYTES # BLD AUTO: 0.34 10*3/MM3 (ref 0.1–0.9)
MONOCYTES NFR BLD AUTO: 9 % (ref 5–12)
NEUTROPHILS NFR BLD AUTO: 2 10*3/MM3 (ref 1.7–7)
NEUTROPHILS NFR BLD AUTO: 52.8 % (ref 42.7–76)
NRBC BLD AUTO-RTO: 0.5 /100 WBC (ref 0–0.2)
PLATELET # BLD AUTO: 191 10*3/MM3 (ref 140–450)
PMV BLD AUTO: 9.7 FL (ref 6–12)
RBC # BLD AUTO: 4.59 10*6/MM3 (ref 4.14–5.8)
WBC # BLD AUTO: 3.79 10*3/MM3 (ref 3.4–10.8)

## 2021-04-27 PROCEDURE — 85025 COMPLETE CBC W/AUTO DIFF WBC: CPT | Performed by: INTERNAL MEDICINE

## 2021-04-27 PROCEDURE — 96361 HYDRATE IV INFUSION ADD-ON: CPT

## 2021-04-27 PROCEDURE — 82728 ASSAY OF FERRITIN: CPT | Performed by: INTERNAL MEDICINE

## 2021-04-27 PROCEDURE — 36415 COLL VENOUS BLD VENIPUNCTURE: CPT

## 2021-04-27 PROCEDURE — 99195 PHLEBOTOMY: CPT

## 2021-04-27 RX ORDER — SODIUM CHLORIDE 9 MG/ML
250 INJECTION, SOLUTION INTRAVENOUS ONCE
Status: COMPLETED | OUTPATIENT
Start: 2021-04-27 | End: 2021-04-27

## 2021-04-27 RX ORDER — SODIUM CHLORIDE 9 MG/ML
250 INJECTION, SOLUTION INTRAVENOUS ONCE
Status: CANCELLED | OUTPATIENT
Start: 2021-05-04

## 2021-04-27 RX ADMIN — SODIUM CHLORIDE 250 ML: 9 INJECTION, SOLUTION INTRAVENOUS at 15:59

## 2021-04-27 NOTE — NURSING NOTE
Spoke with Dr. Noonan about patient phlebotomy. As long as patient's hemoglobin is not < 12 phlebotomize patient and check ferritin post phlebotomy for MD to assess.

## 2021-04-27 NOTE — NURSING NOTE
Hgb 13.5  phlebo indicated. After 475 cc pt became diaphoretic and pale. /70 ferritin collected and IVF started.   1620 IVF completed pt states he feels better, no dizziness noted when standing, /72. Pt discharged ambulatory.

## 2021-05-18 NOTE — PROGRESS NOTES
"Chief Complaint   Patient presents with   • Heartburn   • Colon Cancer Screening         History of Present Illness  50-year old male presents to the office today for follow up. He has a history of hemochromatosis and follows with Dr. Noonan. He undergoes phlebotomy every 3-4 months. He denies any jaundice. He denies any arrhythmias.     His last EGD for surveillance of Denis's esophagus was performed November 2018. He continues omeprazole 40 mg once daily. Symptoms are well controlled.    He reports having a bowel obstruction about 2 weeks ago and was seen at [facility]. Symptoms started with a fever and then abdominal pain started. He reports feeling much better. He has a little bit of discomfort. CT scan on 4/13/2021 demonstrating a mild small bowel dilatation involving the jejunum with a relative transition point in the mid abdomen suggestive of low-grade partial SBO particularly related to adhesive disease. He has had a total of 4 bowel obstructions in the past, one of which was from C. Difficile and another from scar tissue. He has undergone lysis of adhesions in 2014. He has also had a lap cholecystectomy.     Review of Systems   Constitutional: Negative for fever and unexpected weight change.   HENT: Negative for trouble swallowing.    Cardiovascular: Negative for chest pain.   Gastrointestinal: Positive for abdominal pain. Negative for abdominal distention, anal bleeding, blood in stool, constipation, diarrhea, nausea, rectal pain and vomiting.        Result Review :       ENDOSCOPY, INT (11/15/2018)  SCANNED - COLONOSCOPY (10/25/2017)  CT abdomen pelvis w contrast (06/20/2017 09:12)  SCANNED - IMAGING (11/07/2017)      Vital Signs:   /80   Pulse 94   Temp 97.5 °F (36.4 °C)   Resp 16   Ht 180.3 cm (71\")   Wt 104 kg (228 lb 3.2 oz)   SpO2 99%   BMI 31.83 kg/m²     Body mass index is 31.83 kg/m².     Physical Exam  Vitals reviewed.   Constitutional:       Appearance: Normal " appearance.   HENT:      Nose: No nasal deformity.   Eyes:      General: No scleral icterus.  Neck:      Comments: Trachea midline.  Cardiovascular:      Rate and Rhythm: Normal rate and regular rhythm.   Pulmonary:      Effort: No respiratory distress.      Breath sounds: Normal breath sounds.   Abdominal:      General: Bowel sounds are normal. There is no distension.      Palpations: Abdomen is soft. There is no mass.      Tenderness: There is no abdominal tenderness.   Lymphadenopathy:      Comments: No periumbilical lymphadenopathy.   Skin:     General: Skin is warm.   Neurological:      Mental Status: He is alert.           Assessment and Plan {CC Problem List  Visit Diagnosis  ROS  Review (Popup)  [facility]  Quality  BestPractice  Medications  SmartSets  SnapShot Encounters  Media :23}   Diagnoses and all orders for this visit:    1. Gastroesophageal reflux disease, unspecified whether esophagitis present (Primary)    2. Colon cancer screening    3. Hereditary hemochromatosis (CMS/HCC)    4. Denis's esophagus without dysplasia    Other orders  -     Hepatic Function Panel  -     AFP Tumor Marker  -     US Liver; Future  -     MRI Abdomen With Contrast; Future       Documentation by Michelle BOGGS acting as a scribe in the following sections (HPI, Assessment, Plan) for the undersigned provider.         I have reviewed and confirmed the accuracy of the HPI and Assessment and Plan as documented by the APRN AMBROSE Pitts        Follow Up   No follow-ups on file.    Patient Instructions   1. For surveillance of Denis's esophagus, we will schedule your EGD for November 2021.     2. Continue omeprazole 40 mg once daily.    3. For follow up on hemochromatosis we will order a hepatic function panel and AFP.    4. For further evaluation of the small bowel, given your history of repeat small bowel obstructions, we will order an enteroclysis through [facility] and contact you to schedule.      5. We will also order an MRI of the abdomen to further evaluate the liver and spleen.      Discussion:    For ongoing monitoring of hemochromatosis we will check a hepatic function panel and AFP. We will also order a liver u/s. For further evaluation of splenomegaly and heoochromatosis we will also schedule an MRI of the abdomen with contrast. Patient to continue to follow with Dr. Noonan for phlebotomy.     Due to his recurrent SBOs, we discussed options of SBFT vs. Enteroclysis.

## 2021-05-19 ENCOUNTER — OFFICE VISIT (OUTPATIENT)
Dept: GASTROENTEROLOGY | Facility: CLINIC | Age: 51
End: 2021-05-19

## 2021-05-19 ENCOUNTER — PREP FOR SURGERY (OUTPATIENT)
Dept: SURGERY | Facility: SURGERY CENTER | Age: 51
End: 2021-05-19

## 2021-05-19 VITALS
HEART RATE: 94 BPM | RESPIRATION RATE: 16 BRPM | WEIGHT: 228.2 LBS | HEIGHT: 71 IN | BODY MASS INDEX: 31.95 KG/M2 | DIASTOLIC BLOOD PRESSURE: 80 MMHG | OXYGEN SATURATION: 99 % | TEMPERATURE: 97.5 F | SYSTOLIC BLOOD PRESSURE: 118 MMHG

## 2021-05-19 DIAGNOSIS — R10.84 GENERALIZED ABDOMINAL PAIN: Primary | ICD-10-CM

## 2021-05-19 DIAGNOSIS — K21.9 GASTROESOPHAGEAL REFLUX DISEASE, UNSPECIFIED WHETHER ESOPHAGITIS PRESENT: Primary | ICD-10-CM

## 2021-05-19 DIAGNOSIS — K56.600 PARTIAL INTESTINAL OBSTRUCTION, UNSPECIFIED CAUSE (HCC): ICD-10-CM

## 2021-05-19 DIAGNOSIS — K22.70 BARRETT'S ESOPHAGUS WITHOUT DYSPLASIA: Chronic | ICD-10-CM

## 2021-05-19 DIAGNOSIS — Z87.19 HISTORY OF SMALL BOWEL OBSTRUCTION: ICD-10-CM

## 2021-05-19 DIAGNOSIS — Z12.11 COLON CANCER SCREENING: ICD-10-CM

## 2021-05-19 DIAGNOSIS — K22.70 BARRETT'S ESOPHAGUS WITHOUT DYSPLASIA: ICD-10-CM

## 2021-05-19 DIAGNOSIS — E83.110 HEREDITARY HEMOCHROMATOSIS (HCC): Chronic | ICD-10-CM

## 2021-05-19 PROCEDURE — 99204 OFFICE O/P NEW MOD 45 MIN: CPT | Performed by: INTERNAL MEDICINE

## 2021-05-19 RX ORDER — SODIUM CHLORIDE, SODIUM LACTATE, POTASSIUM CHLORIDE, CALCIUM CHLORIDE 600; 310; 30; 20 MG/100ML; MG/100ML; MG/100ML; MG/100ML
30 INJECTION, SOLUTION INTRAVENOUS CONTINUOUS PRN
Status: CANCELLED | OUTPATIENT
Start: 2021-05-19

## 2021-05-19 RX ORDER — SODIUM CHLORIDE 0.9 % (FLUSH) 0.9 %
3 SYRINGE (ML) INJECTION EVERY 12 HOURS SCHEDULED
Status: CANCELLED | OUTPATIENT
Start: 2021-05-19

## 2021-05-19 RX ORDER — SODIUM CHLORIDE 0.9 % (FLUSH) 0.9 %
10 SYRINGE (ML) INJECTION AS NEEDED
Status: CANCELLED | OUTPATIENT
Start: 2021-05-19

## 2021-05-19 NOTE — PATIENT INSTRUCTIONS
1. For surveillance of Denis's esophagus, we will schedule your EGD for November 2021.     2. Continue omeprazole 40 mg once daily.    3. For follow up on hemochromatosis we will order a hepatic function panel and AFP.    4. For further evaluation of the small bowel, given your history of repeat small bowel obstructions, we will order an enteroclysis through [facility] and contact you to schedule.     5. We will also order an MRI of the abdomen to further evaluate the liver and spleen.

## 2021-05-20 ENCOUNTER — TRANSCRIBE ORDERS (OUTPATIENT)
Dept: LAB | Facility: SURGERY CENTER | Age: 51
End: 2021-05-20

## 2021-05-20 DIAGNOSIS — Z01.818 OTHER SPECIFIED PRE-OPERATIVE EXAMINATION: Primary | ICD-10-CM

## 2021-05-20 PROBLEM — K22.70 BARRETT'S ESOPHAGUS WITHOUT DYSPLASIA: Status: ACTIVE | Noted: 2021-05-20

## 2021-05-20 PROBLEM — K21.9 GASTROESOPHAGEAL REFLUX DISEASE: Status: ACTIVE | Noted: 2021-05-20

## 2021-05-20 LAB
AFP-TM SERPL-MCNC: 1.9 NG/ML (ref 0–8.3)
ALBUMIN SERPL-MCNC: 5.1 G/DL (ref 3.5–5.2)
ALP SERPL-CCNC: 66 U/L (ref 39–117)
ALT SERPL-CCNC: 34 U/L (ref 1–41)
AST SERPL-CCNC: 23 U/L (ref 1–40)
BILIRUB DIRECT SERPL-MCNC: 0.2 MG/DL (ref 0–0.3)
BILIRUB SERPL-MCNC: 0.7 MG/DL (ref 0–1.2)
PROT SERPL-MCNC: 7.4 G/DL (ref 6–8.5)

## 2021-05-26 ENCOUNTER — HOSPITAL ENCOUNTER (OUTPATIENT)
Dept: ULTRASOUND IMAGING | Facility: HOSPITAL | Age: 51
Discharge: HOME | End: 2021-05-26
Admitting: INTERNAL MEDICINE

## 2021-05-26 DIAGNOSIS — K22.70 BARRETT'S ESOPHAGUS WITHOUT DYSPLASIA: Chronic | ICD-10-CM

## 2021-05-26 DIAGNOSIS — K21.9 GASTROESOPHAGEAL REFLUX DISEASE, UNSPECIFIED WHETHER ESOPHAGITIS PRESENT: ICD-10-CM

## 2021-05-26 DIAGNOSIS — E83.110 HEREDITARY HEMOCHROMATOSIS (HCC): Chronic | ICD-10-CM

## 2021-05-26 DIAGNOSIS — Z12.11 COLON CANCER SCREENING: ICD-10-CM

## 2021-05-26 PROCEDURE — 76705 ECHO EXAM OF ABDOMEN: CPT

## 2021-06-05 ENCOUNTER — HOSPITAL ENCOUNTER (OUTPATIENT)
Dept: MRI IMAGING | Facility: HOSPITAL | Age: 51
Discharge: HOME | End: 2021-06-05
Admitting: INTERNAL MEDICINE

## 2021-06-05 DIAGNOSIS — E83.110 HEREDITARY HEMOCHROMATOSIS (HCC): Chronic | ICD-10-CM

## 2021-06-05 DIAGNOSIS — Z12.11 COLON CANCER SCREENING: ICD-10-CM

## 2021-06-05 DIAGNOSIS — K22.70 BARRETT'S ESOPHAGUS WITHOUT DYSPLASIA: Chronic | ICD-10-CM

## 2021-06-05 DIAGNOSIS — K21.9 GASTROESOPHAGEAL REFLUX DISEASE, UNSPECIFIED WHETHER ESOPHAGITIS PRESENT: ICD-10-CM

## 2021-06-05 PROCEDURE — 0 GADOBENATE DIMEGLUMINE 529 MG/ML SOLUTION: Performed by: INTERNAL MEDICINE

## 2021-06-05 PROCEDURE — A9577 INJ MULTIHANCE: HCPCS | Performed by: INTERNAL MEDICINE

## 2021-06-05 PROCEDURE — 74183 MRI ABD W/O CNTR FLWD CNTR: CPT

## 2021-06-05 RX ADMIN — GADOBENATE DIMEGLUMINE 20 ML: 529 INJECTION, SOLUTION INTRAVENOUS at 11:04

## 2021-07-08 DIAGNOSIS — K56.600 PARTIAL INTESTINAL OBSTRUCTION, UNSPECIFIED CAUSE (HCC): ICD-10-CM

## 2021-07-15 DIAGNOSIS — K66.0 ABDOMINAL ADHESIONS: ICD-10-CM

## 2021-07-15 DIAGNOSIS — R10.84 GENERALIZED ABDOMINAL PAIN: Primary | ICD-10-CM

## 2021-07-15 DIAGNOSIS — Z87.19 HISTORY OF SMALL BOWEL OBSTRUCTION: ICD-10-CM

## 2021-07-20 ENCOUNTER — LAB (OUTPATIENT)
Dept: OTHER | Facility: HOSPITAL | Age: 51
End: 2021-07-20

## 2021-07-20 ENCOUNTER — INFUSION (OUTPATIENT)
Dept: ONCOLOGY | Facility: HOSPITAL | Age: 51
End: 2021-07-20

## 2021-07-20 VITALS
SYSTOLIC BLOOD PRESSURE: 114 MMHG | WEIGHT: 233.2 LBS | OXYGEN SATURATION: 97 % | DIASTOLIC BLOOD PRESSURE: 77 MMHG | BODY MASS INDEX: 32.65 KG/M2 | RESPIRATION RATE: 18 BRPM | HEART RATE: 92 BPM | TEMPERATURE: 97.1 F | HEIGHT: 71 IN

## 2021-07-20 DIAGNOSIS — E83.110 HEREDITARY HEMOCHROMATOSIS (HCC): Primary | ICD-10-CM

## 2021-07-20 DIAGNOSIS — E83.110 HEREDITARY HEMOCHROMATOSIS (HCC): ICD-10-CM

## 2021-07-20 LAB
ALBUMIN SERPL-MCNC: 4.5 G/DL (ref 3.5–5.2)
ALBUMIN/GLOB SERPL: 1.5 G/DL
ALP SERPL-CCNC: 61 U/L (ref 39–117)
ALT SERPL W P-5'-P-CCNC: 35 U/L (ref 1–41)
ANION GAP SERPL CALCULATED.3IONS-SCNC: 11.6 MMOL/L (ref 5–15)
AST SERPL-CCNC: 27 U/L (ref 1–40)
BASOPHILS # BLD AUTO: 0.01 10*3/MM3 (ref 0–0.2)
BASOPHILS NFR BLD AUTO: 0.2 % (ref 0–1.5)
BILIRUB SERPL-MCNC: 0.6 MG/DL (ref 0–1.2)
BUN SERPL-MCNC: 14 MG/DL (ref 6–20)
BUN/CREAT SERPL: 14 (ref 7–25)
CALCIUM SPEC-SCNC: 9.3 MG/DL (ref 8.6–10.5)
CHLORIDE SERPL-SCNC: 103 MMOL/L (ref 98–107)
CO2 SERPL-SCNC: 25.4 MMOL/L (ref 22–29)
CREAT SERPL-MCNC: 1 MG/DL (ref 0.76–1.27)
DEPRECATED RDW RBC AUTO: 44.8 FL (ref 37–54)
EOSINOPHIL # BLD AUTO: 0.07 10*3/MM3 (ref 0–0.4)
EOSINOPHIL NFR BLD AUTO: 1.4 % (ref 0.3–6.2)
ERYTHROCYTE [DISTWIDTH] IN BLOOD BY AUTOMATED COUNT: 14.6 % (ref 12.3–15.4)
FERRITIN SERPL-MCNC: 30 NG/ML (ref 30–400)
FERRITIN SERPL-MCNC: 30.4 NG/ML (ref 30–400)
GFR SERPL CREATININE-BSD FRML MDRD: 78 ML/MIN/1.73
GLOBULIN UR ELPH-MCNC: 3 GM/DL
GLUCOSE SERPL-MCNC: 120 MG/DL (ref 65–99)
HCT VFR BLD AUTO: 41.3 % (ref 37.5–51)
HGB BLD-MCNC: 13.9 G/DL (ref 13–17.7)
IMM GRANULOCYTES # BLD AUTO: 0.02 10*3/MM3 (ref 0–0.05)
IMM GRANULOCYTES NFR BLD AUTO: 0.4 % (ref 0–0.5)
LYMPHOCYTES # BLD AUTO: 1.44 10*3/MM3 (ref 0.7–3.1)
LYMPHOCYTES NFR BLD AUTO: 28.8 % (ref 19.6–45.3)
MCH RBC QN AUTO: 28.5 PG (ref 26.6–33)
MCHC RBC AUTO-ENTMCNC: 33.7 G/DL (ref 31.5–35.7)
MCV RBC AUTO: 84.6 FL (ref 79–97)
MONOCYTES # BLD AUTO: 0.27 10*3/MM3 (ref 0.1–0.9)
MONOCYTES NFR BLD AUTO: 5.4 % (ref 5–12)
NEUTROPHILS NFR BLD AUTO: 3.19 10*3/MM3 (ref 1.7–7)
NEUTROPHILS NFR BLD AUTO: 63.8 % (ref 42.7–76)
NRBC BLD AUTO-RTO: 0 /100 WBC (ref 0–0.2)
PLATELET # BLD AUTO: 162 10*3/MM3 (ref 140–450)
PMV BLD AUTO: 9.8 FL (ref 6–12)
POTASSIUM SERPL-SCNC: 3.7 MMOL/L (ref 3.5–5.2)
PROT SERPL-MCNC: 7.5 G/DL (ref 6–8.5)
RBC # BLD AUTO: 4.88 10*6/MM3 (ref 4.14–5.8)
SODIUM SERPL-SCNC: 140 MMOL/L (ref 136–145)
WBC # BLD AUTO: 5 10*3/MM3 (ref 3.4–10.8)

## 2021-07-20 PROCEDURE — 82728 ASSAY OF FERRITIN: CPT

## 2021-07-20 PROCEDURE — 85025 COMPLETE CBC W/AUTO DIFF WBC: CPT | Performed by: INTERNAL MEDICINE

## 2021-07-20 PROCEDURE — 36415 COLL VENOUS BLD VENIPUNCTURE: CPT

## 2021-07-20 PROCEDURE — 82728 ASSAY OF FERRITIN: CPT | Performed by: INTERNAL MEDICINE

## 2021-07-20 PROCEDURE — 80053 COMPREHEN METABOLIC PANEL: CPT | Performed by: INTERNAL MEDICINE

## 2021-07-20 NOTE — NURSING NOTE
Ferritin is 30. Pt is concerned because his number has never been that low. New order placed for a new STAT Ferritin. Ferritin 30.40. Pt does not qualify for a phlebotomy. Labs given to pt and offered an earlier lab appt to check Ferritin again. Pt declined. Pt V/U.

## 2021-08-09 ENCOUNTER — OFFICE VISIT (OUTPATIENT)
Dept: SURGERY | Facility: CLINIC | Age: 51
End: 2021-08-09

## 2021-08-09 VITALS — WEIGHT: 234 LBS | HEIGHT: 71 IN | BODY MASS INDEX: 32.76 KG/M2

## 2021-08-09 DIAGNOSIS — R93.5 ABNORMAL CT OF THE ABDOMEN: Primary | ICD-10-CM

## 2021-08-09 PROCEDURE — 99243 OFF/OP CNSLTJ NEW/EST LOW 30: CPT | Performed by: SURGERY

## 2021-08-11 NOTE — PROGRESS NOTES
SUMMARY (A/P):    51-year-old gentleman with hemochromatosis which led to liver ultrasound followed by MRI of the abdomen, results detailed below but nothing significant was found.  A CT enteroclysis was performed at [facility] with suggested probable mild enteroperitoneal adhesions.  Given that he has had previous laparotomy with lysis of adhesions, it is a given that he would have intraperitoneal adhesions and I informed him that in the absence evidence of bowel obstruction no further work-up or treatment is warranted.      CC:    Abnormal CT enteroclysis, referred for consultation by Dr. Meehan    HPI:    51-year-old gentleman indicates to me he was admitted to [facility] in April secondary to moderately severe generalized abdominal pain associated with bloating and emesis.  He was diagnosed with small bowel obstruction and this resolved with conservative management.  Similar episode in 2016 likewise resolved with conservative management.  Similar episode in 2014 required lysis of adhesions.  He indicates that he has been diagnosed with hemochromatosis and that this led to ultrasound of his liver.  Ultrasound showed increased echogenicity of the liver compatible with fatty infiltration and this led to MRI of the abdomen.  MRI showed no evidence of iron deposition within the liver and no other significant abnormality.  The indications for the CT enteroclysis are unclear to me.    ENDOSCOPY:   • Colonoscopy 10/25/2017 Dr. Meehan: Diverticulosis  • EGD November 2, 2015 2018 Dr. Meehan: Short segment Denis's esophagus, erythematous mucosa in antrum    RADIOLOGY:   • CT enteroclysis 6/8/2021 [facility]: Probable mild intraperitoneal adhesions in the right lower abdomen and left midabdomen without CT evidence of small bowel obstruction.  No active enteritis or colitis.  • I reviewed he had performed in May 2021 as well of June 2017.  I reviewed MRI images from June 2021.  No acute  radiographic abnormality noted.    PREVIOUS ABDOMINAL SURGERY    • Laparoscopic cholecystectomy 2013  • Open laparotomy with lysis of adhesions 2014    PMH:    • Hemochromatosis  • Hypertension  • Gastroesophageal reflux disease    ALLERGIES:   • Cefdinir-hives    MEDICATIONS:   • Vitamin D  • Losartan  • Omeprazole  • Psyllium    FAMILY HISTORY:    • Colorectal cancer: Negative    SOCIAL HISTORY:   • Denies tobacco use  • Occasional alcohol use    PHYSICAL EXAM:   • Constitutional: Well-developed well-nourished, no acute distress  • Vital signs: Weight 234 pounds, height 71 inches, BMI 32.7  • Eyes: Conjunctiva normal, sclera nonicteric  • ENMT: Hearing grossly normal, oral mucosa moist  • Neck: Supple, no palpable mass, trachea midline  • Respiratory: Clear to auscultation, normal inspiratory effort  • Cardiovascular: Regular rate, no murmur, no carotid bruit  • Gastrointestinal: Soft, nontender, no palpable mass, no hepatosplenomegaly, negative for hernia, well-healed upper midline incision  • Lymphatics (palpable nodes):  cervical-negative, inguinal-negative  • Skin:  Warm, dry, no rash on visualized skin surfaces  • Musculoskeletal: Symmetric strength, normal gait  • Psychiatric: Alert and oriented ×3, normal affect     ROS:    No cough, chest pain, shortness of air.  All other systems reviewed and negative other than presenting complaints.    ANDRES WHEELER M.D.

## 2021-08-25 ENCOUNTER — TELEPHONE (OUTPATIENT)
Dept: INTERNAL MEDICINE | Facility: CLINIC | Age: 51
End: 2021-08-25

## 2021-08-25 DIAGNOSIS — Z00.00 HEALTHCARE MAINTENANCE: Primary | ICD-10-CM

## 2021-08-25 DIAGNOSIS — K76.0 FATTY LIVER: ICD-10-CM

## 2021-08-25 DIAGNOSIS — K27.9 PEPTIC ULCER DISEASE: ICD-10-CM

## 2021-08-25 DIAGNOSIS — I10 ESSENTIAL HYPERTENSION: ICD-10-CM

## 2021-08-25 DIAGNOSIS — E55.9 AVITAMINOSIS D: ICD-10-CM

## 2021-08-25 DIAGNOSIS — E78.5 DYSLIPIDEMIA: ICD-10-CM

## 2021-08-25 DIAGNOSIS — R73.01 IFG (IMPAIRED FASTING GLUCOSE): ICD-10-CM

## 2021-08-25 DIAGNOSIS — E66.9 OBESITY (BMI 30-39.9): ICD-10-CM

## 2021-08-25 NOTE — TELEPHONE ENCOUNTER
Caller: Matias Hughes    Relationship: Self    Best call back number: [phone]    What orders are you requesting (i.e. lab or imaging): FERRITIN LEVEL LABS ORDERS    In what timeframe would the patient need to come in: TOMORROW 08/26    Where will you receive your lab/imaging services: [facility]    Additional notes: PATIENT IS WANTING TO ADD ORDERS TO HAVE FERRITIN LEVELS CHECKED TOMORROW AT LAB APPOINTMENT

## 2021-08-27 LAB
25(OH)D3+25(OH)D2 SERPL-MCNC: 26.1 NG/ML (ref 30–100)
ALBUMIN SERPL-MCNC: 4.7 G/DL (ref 3.5–5.2)
ALBUMIN/GLOB SERPL: 1.7 G/DL
ALP SERPL-CCNC: 71 U/L (ref 39–117)
ALT SERPL-CCNC: 43 U/L (ref 1–41)
APPEARANCE UR: CLEAR
AST SERPL-CCNC: 26 U/L (ref 1–40)
BACTERIA #/AREA URNS HPF: NORMAL /HPF
BASOPHILS # BLD AUTO: 0.02 10*3/MM3 (ref 0–0.2)
BASOPHILS NFR BLD AUTO: 0.4 % (ref 0–1.5)
BILIRUB SERPL-MCNC: 0.7 MG/DL (ref 0–1.2)
BILIRUB UR QL STRIP: NEGATIVE
BUN SERPL-MCNC: 13 MG/DL (ref 6–20)
BUN/CREAT SERPL: 13.5 (ref 7–25)
CALCIUM SERPL-MCNC: 9.4 MG/DL (ref 8.6–10.5)
CASTS URNS QL MICRO: NORMAL /LPF
CHLORIDE SERPL-SCNC: 101 MMOL/L (ref 98–107)
CHOLEST SERPL-MCNC: 147 MG/DL (ref 0–200)
CO2 SERPL-SCNC: 27.2 MMOL/L (ref 22–29)
COLOR UR: YELLOW
CREAT SERPL-MCNC: 0.96 MG/DL (ref 0.76–1.27)
EOSINOPHIL # BLD AUTO: 0.1 10*3/MM3 (ref 0–0.4)
EOSINOPHIL NFR BLD AUTO: 2.1 % (ref 0.3–6.2)
EPI CELLS #/AREA URNS HPF: NORMAL /HPF (ref 0–10)
ERYTHROCYTE [DISTWIDTH] IN BLOOD BY AUTOMATED COUNT: 14.5 % (ref 12.3–15.4)
FERRITIN SERPL-MCNC: 49.3 NG/ML (ref 30–400)
GLOBULIN SER CALC-MCNC: 2.8 GM/DL
GLUCOSE SERPL-MCNC: 119 MG/DL (ref 65–99)
GLUCOSE UR QL: NEGATIVE
HBA1C MFR BLD: 5 % (ref 4.8–5.6)
HCT VFR BLD AUTO: 42.8 % (ref 37.5–51)
HDLC SERPL-MCNC: 36 MG/DL (ref 40–60)
HGB BLD-MCNC: 14.5 G/DL (ref 13–17.7)
HGB UR QL STRIP: NEGATIVE
IMM GRANULOCYTES # BLD AUTO: 0.01 10*3/MM3 (ref 0–0.05)
IMM GRANULOCYTES NFR BLD AUTO: 0.2 % (ref 0–0.5)
IRON SATN MFR SERPL: 24 % (ref 20–50)
IRON SERPL-MCNC: 109 MCG/DL (ref 59–158)
KETONES UR QL STRIP: NEGATIVE
LDLC SERPL CALC-MCNC: 85 MG/DL (ref 0–100)
LEUKOCYTE ESTERASE UR QL STRIP: NEGATIVE
LYMPHOCYTES # BLD AUTO: 1.56 10*3/MM3 (ref 0.7–3.1)
LYMPHOCYTES NFR BLD AUTO: 32.2 % (ref 19.6–45.3)
MCH RBC QN AUTO: 28.9 PG (ref 26.6–33)
MCHC RBC AUTO-ENTMCNC: 33.9 G/DL (ref 31.5–35.7)
MCV RBC AUTO: 85.3 FL (ref 79–97)
MICRO URNS: NORMAL
MICRO URNS: NORMAL
MONOCYTES # BLD AUTO: 0.4 10*3/MM3 (ref 0.1–0.9)
MONOCYTES NFR BLD AUTO: 8.3 % (ref 5–12)
NEUTROPHILS # BLD AUTO: 2.75 10*3/MM3 (ref 1.7–7)
NEUTROPHILS NFR BLD AUTO: 56.8 % (ref 42.7–76)
NITRITE UR QL STRIP: NEGATIVE
NRBC BLD AUTO-RTO: 0 /100 WBC (ref 0–0.2)
PH UR STRIP: 5.5 [PH] (ref 5–7.5)
PLATELET # BLD AUTO: 168 10*3/MM3 (ref 140–450)
POTASSIUM SERPL-SCNC: 4.5 MMOL/L (ref 3.5–5.2)
PROT SERPL-MCNC: 7.5 G/DL (ref 6–8.5)
PROT UR QL STRIP: NEGATIVE
RBC # BLD AUTO: 5.02 10*6/MM3 (ref 4.14–5.8)
RBC #/AREA URNS HPF: NORMAL /HPF (ref 0–2)
SODIUM SERPL-SCNC: 140 MMOL/L (ref 136–145)
SP GR UR: 1.02 (ref 1–1.03)
TIBC SERPL-MCNC: 462 MCG/DL
TRIGL SERPL-MCNC: 150 MG/DL (ref 0–150)
TSH SERPL DL<=0.005 MIU/L-ACNC: 1.38 UIU/ML (ref 0.27–4.2)
UIBC SERPL-MCNC: 353 MCG/DL (ref 112–346)
URINALYSIS REFLEX: NORMAL
UROBILINOGEN UR STRIP-MCNC: 0.2 MG/DL (ref 0.2–1)
VLDLC SERPL CALC-MCNC: 26 MG/DL (ref 5–40)
WBC # BLD AUTO: 4.84 10*3/MM3 (ref 3.4–10.8)
WBC #/AREA URNS HPF: NORMAL /HPF (ref 0–5)

## 2021-09-03 ENCOUNTER — OFFICE VISIT (OUTPATIENT)
Dept: INTERNAL MEDICINE | Facility: CLINIC | Age: 51
End: 2021-09-03

## 2021-09-03 VITALS
HEART RATE: 95 BPM | OXYGEN SATURATION: 95 % | RESPIRATION RATE: 12 BRPM | HEIGHT: 71 IN | DIASTOLIC BLOOD PRESSURE: 90 MMHG | BODY MASS INDEX: 32.2 KG/M2 | WEIGHT: 230 LBS | SYSTOLIC BLOOD PRESSURE: 124 MMHG | TEMPERATURE: 96.9 F

## 2021-09-03 DIAGNOSIS — K22.719 BARRETT'S ESOPHAGUS WITH DYSPLASIA: ICD-10-CM

## 2021-09-03 DIAGNOSIS — K22.70 BARRETT'S ESOPHAGUS WITHOUT DYSPLASIA: ICD-10-CM

## 2021-09-03 DIAGNOSIS — E78.5 DYSLIPIDEMIA: ICD-10-CM

## 2021-09-03 DIAGNOSIS — Z00.00 HEALTHCARE MAINTENANCE: Primary | ICD-10-CM

## 2021-09-03 DIAGNOSIS — K76.0 FATTY LIVER: ICD-10-CM

## 2021-09-03 DIAGNOSIS — E66.9 OBESITY (BMI 30-39.9): ICD-10-CM

## 2021-09-03 DIAGNOSIS — E88.81 METABOLIC SYNDROME: ICD-10-CM

## 2021-09-03 DIAGNOSIS — C44.92 SQUAMOUS CELL CARCINOMA OF SKIN: ICD-10-CM

## 2021-09-03 DIAGNOSIS — K27.9 PEPTIC ULCER DISEASE: ICD-10-CM

## 2021-09-03 DIAGNOSIS — K21.9 GASTROESOPHAGEAL REFLUX DISEASE WITHOUT ESOPHAGITIS: ICD-10-CM

## 2021-09-03 DIAGNOSIS — E55.9 AVITAMINOSIS D: ICD-10-CM

## 2021-09-03 DIAGNOSIS — R73.01 IFG (IMPAIRED FASTING GLUCOSE): ICD-10-CM

## 2021-09-03 DIAGNOSIS — I10 ESSENTIAL HYPERTENSION: ICD-10-CM

## 2021-09-03 DIAGNOSIS — E83.110 HEREDITARY HEMOCHROMATOSIS (HCC): ICD-10-CM

## 2021-09-03 DIAGNOSIS — L71.9 ROSACEA: ICD-10-CM

## 2021-09-03 PROBLEM — E88.810 METABOLIC SYNDROME: Status: ACTIVE | Noted: 2021-09-03

## 2021-09-03 PROCEDURE — 99396 PREV VISIT EST AGE 40-64: CPT | Performed by: INTERNAL MEDICINE

## 2021-09-03 PROCEDURE — 99214 OFFICE O/P EST MOD 30 MIN: CPT | Performed by: INTERNAL MEDICINE

## 2021-09-03 RX ORDER — METRONIDAZOLE 10 MG/G
GEL TOPICAL DAILY
Qty: 60 G | Refills: 2 | Status: SHIPPED | OUTPATIENT
Start: 2021-09-03

## 2021-09-03 RX ORDER — LOSARTAN POTASSIUM 50 MG/1
50 TABLET ORAL 2 TIMES DAILY
Qty: 60 TABLET | Refills: 5 | Status: SHIPPED | OUTPATIENT
Start: 2021-09-03 | End: 2021-11-22

## 2021-09-03 RX ORDER — ATORVASTATIN CALCIUM 20 MG/1
20 TABLET, FILM COATED ORAL DAILY
Qty: 90 TABLET | Refills: 2 | Status: SHIPPED | OUTPATIENT
Start: 2021-09-03 | End: 2022-05-23

## 2021-09-03 NOTE — PROGRESS NOTES
"Annual Exam (review of medical issues )      HPI  Matias Hughes is a 51 y.o. male RTC In yearly CPE, review of medical issues:   1. Hx of SBO related to adhesions from gallbladder surgery adhesions; 2013; 2015; 2016 RTC in hospital follow-up after recent inpt stay at [facility] for recurrent SBO 4/13-4/16/21 - did numerous tests and 'nothing to do\".  Has EGD upcoming.  Did see [facility] for eval and CT enteroclysis. \"He was underwhelmed on results\".   2. HTN - new dx in 2019, on one drug.  No home checks.    3. IFG/ Obesity - persistent issue, weight is stable. No change in diet or exercise. Walking dogs now, but off volleyball due to COVID. Has bike but is not riding. No diet mods as effort for weight loss.  4. Hemachromatosis - phlebotomy ~ 4x/ year with goal ferritin < 50 per Dr. Noonan - saw Heme 6-9 months.  No on annual f/u. Last time checked ferritin was < 50 and did not get phlebotomy but did get phlebotomy in 4/2021. Pt worried well as has never been < 50 since dx unless when had ulcer. NO bleeding noted.  Has EGD upcoming.   5. Reflux esophagitis/ Denis's Esophagus repeat  EGD 11/2018 --> repeat 3 years -  controlled on 40 mg Omeprazole.  No issues swallowing. EGD upcoming.   6. Vitamin D deficiency - on 1000 I.U. Vitamin D3 OTC some days, 'fallen off the habit'.  7. Squamous Cell CA of skin ~2014 - sees derm q 6 months.  Saw them 2 days ago.  Has AK's most times when goes in, had 3 this last time. Had cryotherapy for those lesions.   8. Rosacea - off minocycline for years now. Has had some flare in last few weeks.  No mention at recent derm appt.  Will get 'pustules'. No burning or redness usually.     Review of Systems   Constitutional: Negative for chills, fever, malaise/fatigue, weight gain and weight loss.   HENT: Positive for tinnitus (mild in L ear, not progressive). Negative for congestion, hearing loss, odynophagia and sore throat.    Eyes: Negative for discharge, double vision, pain and redness.        " Last eye exam ~10/2020; wears contacts     Cardiovascular: Negative for chest pain, dyspnea on exertion, irregular heartbeat, leg swelling, near-syncope, palpitations and syncope.   Respiratory: Negative for cough and shortness of breath.    Endocrine: Negative for polydipsia, polyphagia and polyuria.   Hematologic/Lymphatic: Negative for bleeding problem. Does not bruise/bleed easily.   Skin: Positive for rash (rosacea noted). Negative for suspicious lesions.   Musculoskeletal: Negative for joint pain, joint swelling, muscle cramps, muscle weakness and myalgias.   Gastrointestinal: Negative for constipation, diarrhea, dysphagia, heartburn, nausea and vomiting.   Genitourinary: Negative for dysuria, frequency, hematuria, hesitancy and incomplete emptying.   Neurological: Positive for headaches (mild this AM, nothing out of ordinary ). Negative for dizziness and light-headedness.   Psychiatric/Behavioral: Negative for depression. The patient does not have insomnia and is not nervous/anxious.    Allergic/Immunologic: Negative for environmental allergies and persistent infections.       Problem List:    Patient Active Problem List   Diagnosis   • Hemochromatosis   • Squamous cell carcinoma of skin   • Avitaminosis D   • Rosacea   • Dyslipidemia   • IFG (impaired fasting glucose)   • Obesity (BMI 30-39.9)   • Fatty liver   • Ventral hernia without obstruction or gangrene   • Denis's esophagus with dysplasia   • Peptic ulcer disease   • Diverticulosis of large intestine without hemorrhage   • Upper back pain, chronic   • Essential hypertension   • Gastroesophageal reflux disease   • Denis's esophagus without dysplasia   • Metabolic syndrome       Medical History:    Past Medical History:   Diagnosis Date   • Acute prostatitis 10/11/2018    10/2018; on background of chronic prostatitis    • Diverticulosis    • Diverticulosis of large intestine without hemorrhage 11/2/2018   • GERD (gastroesophageal reflux disease)     • H/O hypogonadism    • Hemochromatosis     heterozygous for [id number] mutation hx elevated LFT's Ferritin elevated at dx   • History of cholelithiasis    • History of chronic prostatitis     2010 tx'd by Dr Cooper   • History of Clostridium difficile colitis     Hospitalized June 2013; with SBO   • History of small bowel obstruction     related to adhesions from gallbladder surgery adhesions; 2013; 2015; 2016   • Overweight    • Prehypertension    • Rosacea 6/2/2016   • Squamous cell carcinoma of skin     2012 sees derm q 6 months (Dr. Mitchell)   • Vitamin D deficiency         Social History:    Social History     Socioeconomic History   • Marital status:      Spouse name: Shante   • Number of children: 2   • Years of education: College   • Highest education level: Not on file   Tobacco Use   • Smoking status: Never Smoker   • Smokeless tobacco: Never Used   Vaping Use   • Vaping Use: Never used   Substance and Sexual Activity   • Alcohol use: Yes     Alcohol/week: 5.0 standard drinks     Types: 5 Cans of beer per week     Comment: 2-5 drinks week   • Drug use: No   • Sexual activity: Yes     Partners: Female     Birth control/protection: None     Comment: wife only; no hx STD's       Family History:   Family History   Problem Relation Age of Onset   • Hypothyroidism Mother    • Rheum arthritis Maternal Aunt    • Breast cancer Maternal Aunt    • Glaucoma Paternal Grandmother    • Breast cancer Maternal Grandmother    • Migraines Daughter    • Seizures Daughter         Epilepsy   • Migraines Son    • Colon cancer Neg Hx    • Colon polyps Neg Hx        Surgical History:   Past Surgical History:   Procedure Laterality Date   • CHOLECYSTECTOMY  2013   • ENDOSCOPY  2018   • ENDOSCOPY AND COLONOSCOPY N/A 2017   • LYSIS OF ABDOMINAL ADHESIONS  07/10/2014    after SBO   • SINUS SURGERY  2007    persistent congestion ?? turbinate reduction   • TONSILLECTOMY      1978         Current Outpatient Medications:   •   cholecalciferol (VITAMIN D3) 1000 units tablet, Take 1 tablet by mouth Daily., Disp: 30 tablet, Rfl: 5  •  omeprazole (priLOSEC) 40 MG capsule, Take 1 capsule by mouth Daily., Disp: 90 capsule, Rfl: 3  •  psyllium (METAMUCIL) 58.6 % powder, Take  by mouth Daily., Disp: , Rfl: 12  •  atorvastatin (LIPITOR) 20 MG tablet, Take 1 tablet by mouth Daily., Disp: 90 tablet, Rfl: 2  •  losartan (Cozaar) 50 MG tablet, Take 1 tablet by mouth 2 (Two) Times a Day., Disp: 60 tablet, Rfl: 5  •  metroNIDAZOLE (Metrogel) 1 % gel, Apply  topically to the appropriate area as directed Daily., Disp: 60 g, Rfl: 2    Vitals:    09/03/21 0904   BP: 124/90   Pulse: 95   Resp: 12   Temp: 96.9 °F (36.1 °C)   SpO2: 95%     Body mass index is 32.08 kg/m².    Physical Exam  Vitals reviewed.   Constitutional:       General: He is not in acute distress.     Appearance: Normal appearance. He is well-developed. He is not ill-appearing or toxic-appearing.   HENT:      Head: Normocephalic and atraumatic.      Right Ear: Hearing, tympanic membrane, ear canal and external ear normal.      Left Ear: Hearing, tympanic membrane, ear canal and external ear normal.      Nose: Nose normal.      Mouth/Throat:      Mouth: Mucous membranes are moist. No oral lesions.      Tongue: No lesions.      Pharynx: Oropharynx is clear. Uvula midline. No pharyngeal swelling, oropharyngeal exudate, posterior oropharyngeal erythema or uvula swelling.   Eyes:      General: Lids are normal. No scleral icterus.        Right eye: No discharge.         Left eye: No discharge.      Extraocular Movements: Extraocular movements intact.      Conjunctiva/sclera: Conjunctivae normal.      Pupils: Pupils are equal, round, and reactive to light.   Neck:      Thyroid: No thyroid mass or thyromegaly.      Vascular: No carotid bruit.   Cardiovascular:      Rate and Rhythm: Normal rate and regular rhythm.      Pulses:           Radial pulses are 2+ on the right side and 2+ on the left side.         Dorsalis pedis pulses are 2+ on the right side and 2+ on the left side.        Posterior tibial pulses are 2+ on the right side and 2+ on the left side.      Heart sounds: Normal heart sounds, S1 normal and S2 normal. No murmur heard.   No friction rub. No gallop.    Pulmonary:      Effort: Pulmonary effort is normal. No respiratory distress.      Breath sounds: Normal breath sounds. No wheezing, rhonchi or rales.   Abdominal:      General: Abdomen is flat and protuberant. Bowel sounds are normal. There is no distension.      Palpations: Abdomen is soft. There is no mass.      Tenderness: There is abdominal tenderness (mild) in the periumbilical area. There is no guarding or rebound.   Genitourinary:     Prostate: Normal. Not enlarged and not tender.      Rectum: Normal. No external hemorrhoid. Normal anal tone.   Musculoskeletal:         General: No deformity. Normal range of motion.      Cervical back: Full passive range of motion without pain, normal range of motion and neck supple.      Lumbar back: No swelling, deformity, tenderness or bony tenderness. Normal range of motion. Negative right straight leg raise test and negative left straight leg raise test.      Right hip: No bony tenderness or crepitus. Normal range of motion. Normal strength.      Left hip: No bony tenderness or crepitus. Normal range of motion. Normal strength.      Right lower leg: No tenderness or bony tenderness. No edema.      Left lower leg: No tenderness or bony tenderness. No edema.   Lymphadenopathy:      Cervical: No cervical adenopathy.      Right cervical: No superficial, deep or posterior cervical adenopathy.     Left cervical: No superficial, deep or posterior cervical adenopathy.      Upper Body:      Right upper body: No supraclavicular, axillary or pectoral adenopathy.      Left upper body: No supraclavicular, axillary or pectoral adenopathy.   Skin:     General: Skin is warm and dry.      Findings: No rash.    Neurological:      Mental Status: He is alert and oriented to person, place, and time.      Cranial Nerves: No cranial nerve deficit.      Sensory: No sensory deficit.      Motor: No weakness, tremor, atrophy or abnormal muscle tone.      Gait: Gait normal.      Deep Tendon Reflexes: Reflexes are normal and symmetric.      Reflex Scores:       Patellar reflexes are 2+ on the right side and 2+ on the left side.       Achilles reflexes are 2+ on the right side and 2+ on the left side.  Psychiatric:         Attention and Perception: Attention normal.         Mood and Affect: Mood normal.         Speech: Speech normal.         Behavior: Behavior normal. Behavior is cooperative.         Thought Content: Thought content normal.         Assessment/ Plan  Diagnoses and all orders for this visit:    Healthcare maintenance    Hereditary hemochromatosis (CMS/HCC)    Squamous cell carcinoma of skin    Avitaminosis D    Rosacea  -     metroNIDAZOLE (Metrogel) 1 % gel; Apply  topically to the appropriate area as directed Daily.    Dyslipidemia  -     atorvastatin (LIPITOR) 20 MG tablet; Take 1 tablet by mouth Daily.    IFG (impaired fasting glucose)    Obesity (BMI 30-39.9)    Fatty liver    Denis's esophagus with dysplasia    Peptic ulcer disease    Essential hypertension  -     losartan (Cozaar) 50 MG tablet; Take 1 tablet by mouth 2 (Two) Times a Day.    Denis's esophagus without dysplasia    Gastroesophageal reflux disease without esophagitis    Metabolic syndrome  -     atorvastatin (LIPITOR) 20 MG tablet; Take 1 tablet by mouth Daily.        Return in about 8 weeks (around 10/29/2021) for Recheck.      Discussion:  Matias Hughes is a 51 y.o. male RTC In yearly CPE, review of medical issues:   1. Hx of SBO related to adhesions from gallbladder surgery adhesions; 2013; 2015; 2016 with recent inpt stay at [facility] for recurrent SBO 4/13-4/16/21 - s/p eval at  for eval and CT enteroclysis, results reviewed today with  mild enteroperitoneal adhesions in R lower and L midabdomen. S/p eval with surgery and no intervention advised.  Monitor.   2. HTN - new dx in 2019, not controlled on one drug.  Change losartan to 50mg BID.  Trend numbers and BMP in office in 6 weeks.   3. IFG/ Obesity, new Dx metabolic syndrome- persistent issue, weight is stable with not diet mods and decline in exercise.  A1C normal on labs, however with IFG, truncal obesity, low HDL, HTN, and fatty liver meets criteria for metabolic syndrome. Counseled on dx, need for weight loss and TLC diet mods/ exercise. Will start statin today. Trend numbers.   4. Hemachromatosis - phlebotomy ~ 4x/ year in past with goal ferritin < 50 per Dr. Noonan - reviewed 4/2021 labs with phlebotomy but remained controlled 7/2021 with now slower than usual rise in ferritin.  NO anemia on labs. Reassured pt and will continue to trend ferritin at regular intervals with Heme.   5. Reflux esophagitis/ Denis's Esophagus repeat  EGD 11/2018 --> repeat 3 years, appt upcoming - sx controlled on 40 mg Omeprazole. EGD upcoming with Dr. Meehan.  6. Vitamin D deficiency - persists, not on on 1000 I.U. Vitamin D3 OTC . Restart daily VIt D.   7. Dyslipidemia, low HDL -10 yr CR 4.6%-9.0%; hsCRP high-average on 2020 labs.  However, pt meets criteria for metabolic syndrome and I think risk is higher that CV risk calculator notes. Will start Atrovastatin 20mg daily. Trend numbers in 8 weeks. TLC diet mods and exercise emphasized. Of note, pt very hesistant to start med and may choose TLC mods only, which we will assess success at f/u.  8. Squamous Cell CA of skin ~2014 - sees derm q 6 months, recent visit with AK's and s/p cryotherapy.    9. Rosacea - off minocycline for years now. Mild flare in last few weeks, no pustules today on exam.  MetroGel refilled today for pt for PRN use.  10. Low back pain with occasional pain to R heel - new mention, doorknob complaint today.  Exam benign, no reflex loss of  weakness.  Reassured pt for today, but needs weight loss, daily low back stretches (handout provided), and will reassess +/- imaging at f/u.    11. HM - labs d/w pt; Flu/ Tdap/ Hep A/ Shingrix/ COVID - UTD;  C-scope UTD 2011 --> 10/2017 --> 10 years; NAGI OK, discussed PSA in past, defer discussion to f/u due to time; Hep C Ab (-) 8/2020; add more exercise.     RTC 8 weeks,F labs prior (CMP, FLP)

## 2021-10-12 ENCOUNTER — LAB (OUTPATIENT)
Dept: OTHER | Facility: HOSPITAL | Age: 51
End: 2021-10-12

## 2021-10-12 ENCOUNTER — INFUSION (OUTPATIENT)
Dept: ONCOLOGY | Facility: HOSPITAL | Age: 51
End: 2021-10-12

## 2021-10-12 VITALS
DIASTOLIC BLOOD PRESSURE: 85 MMHG | WEIGHT: 231.4 LBS | TEMPERATURE: 96.9 F | HEIGHT: 71 IN | OXYGEN SATURATION: 96 % | SYSTOLIC BLOOD PRESSURE: 119 MMHG | BODY MASS INDEX: 32.4 KG/M2 | RESPIRATION RATE: 18 BRPM | HEART RATE: 89 BPM

## 2021-10-12 DIAGNOSIS — E83.110 HEREDITARY HEMOCHROMATOSIS (HCC): ICD-10-CM

## 2021-10-12 DIAGNOSIS — E83.110 HEREDITARY HEMOCHROMATOSIS (HCC): Primary | ICD-10-CM

## 2021-10-12 LAB
BASOPHILS # BLD AUTO: 0.01 10*3/MM3 (ref 0–0.2)
BASOPHILS NFR BLD AUTO: 0.2 % (ref 0–1.5)
DEPRECATED RDW RBC AUTO: 47.4 FL (ref 37–54)
EOSINOPHIL # BLD AUTO: 0.11 10*3/MM3 (ref 0–0.4)
EOSINOPHIL NFR BLD AUTO: 2.3 % (ref 0.3–6.2)
ERYTHROCYTE [DISTWIDTH] IN BLOOD BY AUTOMATED COUNT: 14.6 % (ref 12.3–15.4)
FERRITIN SERPL-MCNC: 81.9 NG/ML (ref 30–400)
HCT VFR BLD AUTO: 42.5 % (ref 37.5–51)
HGB BLD-MCNC: 14.1 G/DL (ref 13–17.7)
IMM GRANULOCYTES # BLD AUTO: 0.01 10*3/MM3 (ref 0–0.05)
IMM GRANULOCYTES NFR BLD AUTO: 0.2 % (ref 0–0.5)
LYMPHOCYTES # BLD AUTO: 1.48 10*3/MM3 (ref 0.7–3.1)
LYMPHOCYTES NFR BLD AUTO: 30.8 % (ref 19.6–45.3)
MCH RBC QN AUTO: 29.7 PG (ref 26.6–33)
MCHC RBC AUTO-ENTMCNC: 33.2 G/DL (ref 31.5–35.7)
MCV RBC AUTO: 89.5 FL (ref 79–97)
MONOCYTES # BLD AUTO: 0.35 10*3/MM3 (ref 0.1–0.9)
MONOCYTES NFR BLD AUTO: 7.3 % (ref 5–12)
NEUTROPHILS NFR BLD AUTO: 2.85 10*3/MM3 (ref 1.7–7)
NEUTROPHILS NFR BLD AUTO: 59.2 % (ref 42.7–76)
NRBC BLD AUTO-RTO: 0 /100 WBC (ref 0–0.2)
PLATELET # BLD AUTO: 165 10*3/MM3 (ref 140–450)
PMV BLD AUTO: 9.5 FL (ref 6–12)
RBC # BLD AUTO: 4.75 10*6/MM3 (ref 4.14–5.8)
WBC # BLD AUTO: 4.81 10*3/MM3 (ref 3.4–10.8)

## 2021-10-12 PROCEDURE — 36415 COLL VENOUS BLD VENIPUNCTURE: CPT

## 2021-10-12 PROCEDURE — 99195 PHLEBOTOMY: CPT

## 2021-10-12 PROCEDURE — 96361 HYDRATE IV INFUSION ADD-ON: CPT

## 2021-10-12 PROCEDURE — 85025 COMPLETE CBC W/AUTO DIFF WBC: CPT | Performed by: INTERNAL MEDICINE

## 2021-10-12 PROCEDURE — 82728 ASSAY OF FERRITIN: CPT | Performed by: INTERNAL MEDICINE

## 2021-10-12 RX ORDER — SODIUM CHLORIDE 9 MG/ML
250 INJECTION, SOLUTION INTRAVENOUS ONCE
Status: COMPLETED | OUTPATIENT
Start: 2021-10-12 | End: 2021-10-12

## 2021-10-12 RX ORDER — SODIUM CHLORIDE 9 MG/ML
250 INJECTION, SOLUTION INTRAVENOUS ONCE
Status: CANCELLED | OUTPATIENT
Start: 2021-10-19

## 2021-10-12 RX ADMIN — SODIUM CHLORIDE 250 ML: 900 INJECTION, SOLUTION INTRAVENOUS at 16:30

## 2021-10-15 ENCOUNTER — TRANSCRIBE ORDERS (OUTPATIENT)
Dept: LAB | Facility: SURGERY CENTER | Age: 51
End: 2021-10-15

## 2021-10-15 DIAGNOSIS — Z01.818 OTHER SPECIFIED PRE-OPERATIVE EXAMINATION: Primary | ICD-10-CM

## 2021-10-27 ENCOUNTER — LAB (OUTPATIENT)
Dept: LAB | Facility: SURGERY CENTER | Age: 51
End: 2021-10-27

## 2021-10-27 DIAGNOSIS — Z01.818 OTHER SPECIFIED PRE-OPERATIVE EXAMINATION: ICD-10-CM

## 2021-10-27 LAB — SARS-COV-2 ORF1AB RESP QL NAA+PROBE: NOT DETECTED

## 2021-10-27 PROCEDURE — U0004 COV-19 TEST NON-CDC HGH THRU: HCPCS | Performed by: INTERNAL MEDICINE

## 2021-10-27 PROCEDURE — C9803 HOPD COVID-19 SPEC COLLECT: HCPCS

## 2021-10-29 ENCOUNTER — ANESTHESIA EVENT (OUTPATIENT)
Dept: SURGERY | Facility: SURGERY CENTER | Age: 51
End: 2021-10-29

## 2021-10-29 ENCOUNTER — HOSPITAL ENCOUNTER (OUTPATIENT)
Facility: SURGERY CENTER | Age: 51
Setting detail: HOSPITAL OUTPATIENT SURGERY
Discharge: HOME | End: 2021-10-29
Attending: INTERNAL MEDICINE | Admitting: INTERNAL MEDICINE

## 2021-10-29 ENCOUNTER — ANESTHESIA (OUTPATIENT)
Dept: SURGERY | Facility: SURGERY CENTER | Age: 51
End: 2021-10-29

## 2021-10-29 VITALS
RESPIRATION RATE: 18 BRPM | BODY MASS INDEX: 32.31 KG/M2 | TEMPERATURE: 98 F | HEART RATE: 73 BPM | HEIGHT: 71 IN | WEIGHT: 230.8 LBS | DIASTOLIC BLOOD PRESSURE: 70 MMHG | SYSTOLIC BLOOD PRESSURE: 113 MMHG | OXYGEN SATURATION: 96 %

## 2021-10-29 DIAGNOSIS — K22.70 BARRETT'S ESOPHAGUS WITHOUT DYSPLASIA: ICD-10-CM

## 2021-10-29 DIAGNOSIS — K21.9 GASTROESOPHAGEAL REFLUX DISEASE, UNSPECIFIED WHETHER ESOPHAGITIS PRESENT: ICD-10-CM

## 2021-10-29 PROCEDURE — 0DB98ZX EXCISION OF DUODENUM, VIA NATURAL OR ARTIFICIAL OPENING ENDOSCOPIC, DIAGNOSTIC: ICD-10-PCS | Performed by: NURSE PRACTITIONER

## 2021-10-29 PROCEDURE — 25010000002 PROPOFOL 10 MG/ML EMULSION: Performed by: ANESTHESIOLOGY

## 2021-10-29 PROCEDURE — 88305 TISSUE EXAM BY PATHOLOGIST: CPT | Performed by: INTERNAL MEDICINE

## 2021-10-29 PROCEDURE — 88313 SPECIAL STAINS GROUP 2: CPT | Performed by: INTERNAL MEDICINE

## 2021-10-29 PROCEDURE — 43239 EGD BIOPSY SINGLE/MULTIPLE: CPT | Performed by: INTERNAL MEDICINE

## 2021-10-29 RX ORDER — LIDOCAINE HYDROCHLORIDE 20 MG/ML
INJECTION, SOLUTION INFILTRATION; PERINEURAL AS NEEDED
Status: DISCONTINUED | OUTPATIENT
Start: 2021-10-29 | End: 2021-10-29 | Stop reason: SURG

## 2021-10-29 RX ORDER — PROPOFOL 10 MG/ML
VIAL (ML) INTRAVENOUS AS NEEDED
Status: DISCONTINUED | OUTPATIENT
Start: 2021-10-29 | End: 2021-10-29 | Stop reason: SURG

## 2021-10-29 RX ORDER — SODIUM CHLORIDE, SODIUM LACTATE, POTASSIUM CHLORIDE, CALCIUM CHLORIDE 600; 310; 30; 20 MG/100ML; MG/100ML; MG/100ML; MG/100ML
30 INJECTION, SOLUTION INTRAVENOUS CONTINUOUS PRN
Status: DISCONTINUED | OUTPATIENT
Start: 2021-10-29 | End: 2021-10-29 | Stop reason: HOSPADM

## 2021-10-29 RX ORDER — SODIUM CHLORIDE 0.9 % (FLUSH) 0.9 %
3 SYRINGE (ML) INJECTION EVERY 12 HOURS SCHEDULED
Status: DISCONTINUED | OUTPATIENT
Start: 2021-10-29 | End: 2021-10-29 | Stop reason: HOSPADM

## 2021-10-29 RX ORDER — SODIUM CHLORIDE 0.9 % (FLUSH) 0.9 %
10 SYRINGE (ML) INJECTION AS NEEDED
Status: DISCONTINUED | OUTPATIENT
Start: 2021-10-29 | End: 2021-10-29 | Stop reason: HOSPADM

## 2021-10-29 RX ADMIN — SODIUM CHLORIDE, POTASSIUM CHLORIDE, SODIUM LACTATE AND CALCIUM CHLORIDE 30 ML/HR: 600; 310; 30; 20 INJECTION, SOLUTION INTRAVENOUS at 09:12

## 2021-10-29 RX ADMIN — PROPOFOL 50 MG: 10 INJECTION, EMULSION INTRAVENOUS at 10:00

## 2021-10-29 RX ADMIN — LIDOCAINE HYDROCHLORIDE 60 MG: 20 INJECTION, SOLUTION INFILTRATION; PERINEURAL at 09:58

## 2021-10-29 RX ADMIN — PROPOFOL 100 MG: 10 INJECTION, EMULSION INTRAVENOUS at 09:58

## 2021-10-29 RX ADMIN — PROPOFOL 50 MG: 10 INJECTION, EMULSION INTRAVENOUS at 10:02

## 2021-10-29 NOTE — ANESTHESIA PREPROCEDURE EVALUATION
Anesthesia Evaluation     Patient summary reviewed and Nursing notes reviewed   NPO Solid Status: > 8 hours  NPO Liquid Status: > 2 hours           Airway   Mallampati: II  TM distance: >3 FB  Neck ROM: full  No difficulty expected  Dental - normal exam     Pulmonary - negative pulmonary ROS and normal exam   Cardiovascular - normal exam  Exercise tolerance: good (4-7 METS)    (+) hypertension, hyperlipidemia,       Neuro/Psych- negative ROS  GI/Hepatic/Renal/Endo    (+)  GERD,  liver disease,     Musculoskeletal     Abdominal    Substance History      OB/GYN          Other      history of cancer                    Anesthesia Plan    ASA 3     MAC     intravenous induction     Anesthetic plan, all risks, benefits, and alternatives have been provided, discussed and informed consent has been obtained with: patient.

## 2021-10-29 NOTE — ANESTHESIA POSTPROCEDURE EVALUATION
"Patient: Matias Hughes    Procedure Summary     Date: 10/29/21 Room / Location: [facility]  / [facility]    Anesthesia Start: 0949 Anesthesia Stop: 1012    Procedure: ESOPHAGOGASTRODUODENOSCOPY WITH BIOPSY (N/A ) Diagnosis:       Gastroesophageal reflux disease, unspecified whether esophagitis present      Denis's esophagus without dysplasia      (Gastroesophageal reflux disease, unspecified whether esophagitis present [K21.9])      (Denis's esophagus without dysplasia [K22.70])    Surgeons: Steven Meehan MD Provider: Tim Nolasco MD    Anesthesia Type: MAC ASA Status: 3          Anesthesia Type: MAC    Vitals  Vitals Value Taken Time   /74 10/29/21 1013   Temp 36.7 °C (98 °F) 10/29/21 1010   Pulse 80 10/29/21 1013   Resp 18 10/29/21 1013   SpO2 95 % 10/29/21 1013           Post Anesthesia Care and Evaluation    Patient location during evaluation: bedside  Patient participation: complete - patient participated  Level of consciousness: awake and alert  Pain score: 0  Pain management: adequate  Airway patency: patent  Anesthetic complications: No anesthetic complications  PONV Status: none  Cardiovascular status: acceptable and hemodynamically stable  Respiratory status: acceptable and spontaneous ventilation  Hydration status: acceptable    Comments: /74 (BP Location: Left arm, Patient Position: Sitting)   Pulse 80   Temp 36.7 °C (98 °F) (Temporal)   Resp 18   Ht 180.3 cm (71\")   Wt 105 kg (230 lb 12.8 oz)   SpO2 95%   BMI 32.19 kg/m²         "

## 2021-11-02 LAB
LAB AP CASE REPORT: NORMAL
LAB AP DIAGNOSIS COMMENT: NORMAL
PATH REPORT.FINAL DX SPEC: NORMAL
PATH REPORT.GROSS SPEC: NORMAL

## 2021-11-22 ENCOUNTER — OFFICE VISIT (OUTPATIENT)
Dept: INTERNAL MEDICINE | Facility: CLINIC | Age: 51
End: 2021-11-22

## 2021-11-22 VITALS
SYSTOLIC BLOOD PRESSURE: 124 MMHG | OXYGEN SATURATION: 100 % | BODY MASS INDEX: 32.06 KG/M2 | DIASTOLIC BLOOD PRESSURE: 80 MMHG | HEART RATE: 78 BPM | WEIGHT: 229 LBS | TEMPERATURE: 97.8 F | HEIGHT: 71 IN

## 2021-11-22 DIAGNOSIS — E78.5 DYSLIPIDEMIA: ICD-10-CM

## 2021-11-22 DIAGNOSIS — I10 ESSENTIAL HYPERTENSION: Primary | ICD-10-CM

## 2021-11-22 DIAGNOSIS — R73.01 IFG (IMPAIRED FASTING GLUCOSE): ICD-10-CM

## 2021-11-22 DIAGNOSIS — Z00.00 HEALTHCARE MAINTENANCE: ICD-10-CM

## 2021-11-22 DIAGNOSIS — E66.9 OBESITY (BMI 30-39.9): ICD-10-CM

## 2021-11-22 DIAGNOSIS — K22.719 BARRETT'S ESOPHAGUS WITH DYSPLASIA: ICD-10-CM

## 2021-11-22 DIAGNOSIS — E88.81 METABOLIC SYNDROME: ICD-10-CM

## 2021-11-22 DIAGNOSIS — M79.10 MYALGIA: ICD-10-CM

## 2021-11-22 PROBLEM — K22.70 BARRETT'S ESOPHAGUS WITHOUT DYSPLASIA: Status: RESOLVED | Noted: 2021-05-20 | Resolved: 2021-11-22

## 2021-11-22 PROCEDURE — 99214 OFFICE O/P EST MOD 30 MIN: CPT | Performed by: INTERNAL MEDICINE

## 2021-11-22 RX ORDER — TELMISARTAN 80 MG/1
80 TABLET ORAL DAILY
Qty: 90 TABLET | Refills: 2 | Status: SHIPPED | OUTPATIENT
Start: 2021-11-22 | End: 2022-08-12 | Stop reason: SDUPTHER

## 2021-11-22 RX ORDER — UBIDECARENONE 50 MG
50 CAPSULE ORAL DAILY
Qty: 30 CAPSULE
Start: 2021-11-22 | End: 2022-11-29

## 2021-11-22 NOTE — PROGRESS NOTES
"Follow-up on cholesterol medication      HPI  Matias Hughes is a 51 y.o. male RTC in f/u:  1. HTN - new dx in 2019, not controlled on one drug. Now on BID with  losartan to 50mg.  No dizziness. No side effects noted.   Was checking at home and getting 'trending more normal after second dose'. Is missing dose in evening on occasion if not at home.   2. IFG/ Obesity, new Dx metabolic syndrome- working on getting lower carbs and more exercise. Traveling some, 'so hard'.  Feels like is 'forefront of mind'. Weight is overall stable.   3. Reflux esophagitis/ Denis's Esophagus repeat  EGD 11/2018 --> repeat 3 years, appt upcoming - sx controlled on 40 mg Omeprazole. Had EGD with Dr. Meehan.  4. Dyslipidemia, low HDL -started on Atorvastatin and tolerating well overall.  Has newer issues with some hand and leg cramping.  \"Charley horses\" overnight.  Feels like 'joints, its the way it feel\".  \"I guess shoulders, hands, achiness\".  Worse in evening.   NO meds tried.    5.  Low back pain with occasional pain to R heel - not really having issues today. \"Low back has not really bothered me since\" last visit.    6. HM - not had PSA in past, notes today on my inquiry that really did not know father's family. Father diet young. Really does not think he has FHx to rely on for prostate CA hx.  Feels he should 'split the difference' and go ahead and check PSA.     Review of Systems   Constitutional: Negative for chills and fever.   Cardiovascular: Negative for chest pain, dyspnea on exertion, near-syncope and syncope.   Respiratory: Negative for shortness of breath.    Musculoskeletal: Positive for joint pain and myalgias. Negative for back pain (resolved), joint swelling and muscle weakness.   Neurological: Negative for dizziness, focal weakness, light-headedness, numbness (R leg, largely resolved after last visit discussion. Rare occurence bending over on golf course. ) and paresthesias.       The following portions of the " "patient's history were reviewed and updated as appropriate: allergies, current medications, past medical history, past social history and problem list.      Current Outpatient Medications:   •  atorvastatin (LIPITOR) 20 MG tablet, Take 1 tablet by mouth Daily., Disp: 90 tablet, Rfl: 2  •  cholecalciferol (VITAMIN D3) 1000 units tablet, Take 1 tablet by mouth Daily., Disp: 30 tablet, Rfl: 5  •  metroNIDAZOLE (Metrogel) 1 % gel, Apply  topically to the appropriate area as directed Daily., Disp: 60 g, Rfl: 2  •  omeprazole (priLOSEC) 40 MG capsule, Take 1 capsule by mouth Daily., Disp: 90 capsule, Rfl: 3  •  psyllium (METAMUCIL) 58.6 % powder, Take  by mouth Daily., Disp: , Rfl: 12  •  Coenzyme Q10 (CoQ10) 50 MG capsule, Take 50 mg by mouth Daily., Disp: 30 capsule, Rfl:   •  telmisartan (MICARDIS) 80 MG tablet, Take 1 tablet by mouth Daily., Disp: 90 tablet, Rfl: 2    Vitals:    11/22/21 1537   BP: 124/80   Pulse: 78   Temp: 97.8 °F (36.6 °C)   TempSrc: Temporal   SpO2: 100%   Weight: 104 kg (229 lb)   Height: 180.3 cm (71\")     Body mass index is 31.94 kg/m².      Physical Exam  Vitals reviewed.   Constitutional:       General: He is not in acute distress.     Appearance: He is well-developed. He is not ill-appearing or toxic-appearing.   HENT:      Head: Normocephalic and atraumatic.      Mouth/Throat:      Mouth: No oral lesions.      Tongue: No lesions.      Pharynx: No pharyngeal swelling or uvula swelling.   Eyes:      General: No scleral icterus.     Pupils: Pupils are equal, round, and reactive to light.   Neck:      Vascular: No carotid bruit.   Cardiovascular:      Rate and Rhythm: Normal rate and regular rhythm.      Pulses:           Carotid pulses are 2+ on the right side and 2+ on the left side.       Radial pulses are 2+ on the right side and 2+ on the left side.      Heart sounds: Normal heart sounds.   Pulmonary:      Effort: Pulmonary effort is normal. No respiratory distress.      Breath sounds: " Normal breath sounds. No wheezing, rhonchi or rales.   Musculoskeletal:      Right shoulder: No tenderness or bony tenderness. Normal range of motion. Normal strength.      Left shoulder: No tenderness or bony tenderness. Normal range of motion. Normal strength.      Right hand: Normal range of motion. Normal strength.      Left hand: Normal range of motion. Normal strength.      Cervical back: Normal range of motion and neck supple. No muscular tenderness.   Lymphadenopathy:      Cervical: No cervical adenopathy.   Neurological:      Mental Status: He is alert and oriented to person, place, and time.      Cranial Nerves: No cranial nerve deficit.      Motor: No weakness.      Gait: Gait normal.   Psychiatric:         Attention and Perception: Attention normal.         Mood and Affect: Mood and affect normal.         Behavior: Behavior normal.         Thought Content: Thought content normal.         Assessment/ Plan  Diagnoses and all orders for this visit:    Essential hypertension  -     telmisartan (MICARDIS) 80 MG tablet; Take 1 tablet by mouth Daily.    Dyslipidemia  -     Coenzyme Q10 (CoQ10) 50 MG capsule; Take 50 mg by mouth Daily.    Myalgia  -     Coenzyme Q10 (CoQ10) 50 MG capsule; Take 50 mg by mouth Daily.    IFG (impaired fasting glucose)    Obesity (BMI 30-39.9)    Metabolic syndrome    Healthcare maintenance    Denis's esophagus with dysplasia        Return in about 6 months (around 5/22/2022).      Discussion:  Matias SIMÓN Hughes is a 51 y.o. male  RTC In f/u:  1. HTN - new dx in 2019, now controlled on losartan to 50mg at BID dosing, but compliance a challenge. Will change to once daily telmisartan 80mg daily.  Trend BMP with upcoming labs.   2. IFG/ Obesity, new Dx metabolic syndrome - working on getting lower carbs and more exercise. Weight stable.  Will trend FBS  And A1C with upcoming labs. Weight loss advised.   3. Reflux esophagitis/ Denis's Esophagus repeat  EGD 11/2018 --> repeat 3 years  done 10/2021 with chronic gastritis noted, no esophageal IM but present in antrum.  3 year EGD planned. C/W PPI as per GI recs.   4. Dyslipidemia, low HDL; Metabolic Syndrome - started on Atorvastatin and tolerating but some vague myalgia sx noted. Exam benign. Start on COQ10 daily OTC.  Trend FLP, LFT's, and CPK upcoming labs.   5.  Low back pain with occasional pain to R heel - minimal issues today. Defer.   6. HM - d/w pt PSA and after further discussion prefers to start annual screen. Will check with upcoming labs.     RT 2 weeks F labs (CMP, FLP, A1C, CPK, PSA)  RTC 6 months

## 2021-12-01 DIAGNOSIS — E66.9 OBESITY (BMI 30-39.9): ICD-10-CM

## 2021-12-01 DIAGNOSIS — I10 ESSENTIAL HYPERTENSION: Primary | ICD-10-CM

## 2021-12-01 DIAGNOSIS — K76.0 FATTY LIVER: ICD-10-CM

## 2021-12-01 DIAGNOSIS — R73.01 IFG (IMPAIRED FASTING GLUCOSE): ICD-10-CM

## 2021-12-01 DIAGNOSIS — E78.5 DYSLIPIDEMIA: ICD-10-CM

## 2021-12-11 LAB
ALBUMIN SERPL-MCNC: 5 G/DL (ref 3.8–4.9)
ALBUMIN/GLOB SERPL: 2.1 {RATIO} (ref 1.2–2.2)
ALP SERPL-CCNC: 77 IU/L (ref 44–121)
ALT SERPL-CCNC: 39 IU/L (ref 0–44)
AST SERPL-CCNC: 22 IU/L (ref 0–40)
BILIRUB SERPL-MCNC: 0.6 MG/DL (ref 0–1.2)
BUN SERPL-MCNC: 17 MG/DL (ref 6–24)
BUN/CREAT SERPL: 16 (ref 9–20)
CALCIUM SERPL-MCNC: 9.8 MG/DL (ref 8.7–10.2)
CHLORIDE SERPL-SCNC: 100 MMOL/L (ref 96–106)
CHOLEST SERPL-MCNC: 106 MG/DL (ref 100–199)
CK SERPL-CCNC: 85 U/L (ref 41–331)
CO2 SERPL-SCNC: 24 MMOL/L (ref 20–29)
CREAT SERPL-MCNC: 1.05 MG/DL (ref 0.76–1.27)
GLOBULIN SER CALC-MCNC: 2.4 G/DL (ref 1.5–4.5)
GLUCOSE SERPL-MCNC: 124 MG/DL (ref 65–99)
HBA1C MFR BLD: 5.2 % (ref 4.8–5.6)
HDLC SERPL-MCNC: 36 MG/DL
LDLC SERPL CALC-MCNC: 46 MG/DL (ref 0–99)
POTASSIUM SERPL-SCNC: 4.7 MMOL/L (ref 3.5–5.2)
PROT SERPL-MCNC: 7.4 G/DL (ref 6–8.5)
PSA SERPL-MCNC: 0.4 NG/ML (ref 0–4)
SODIUM SERPL-SCNC: 139 MMOL/L (ref 134–144)
TRIGL SERPL-MCNC: 134 MG/DL (ref 0–149)
VLDLC SERPL CALC-MCNC: 24 MG/DL (ref 5–40)

## 2021-12-13 ENCOUNTER — TELEPHONE (OUTPATIENT)
Dept: INTERNAL MEDICINE | Facility: CLINIC | Age: 51
End: 2021-12-13

## 2021-12-28 ENCOUNTER — LAB (OUTPATIENT)
Dept: OTHER | Facility: HOSPITAL | Age: 51
End: 2021-12-28

## 2021-12-28 DIAGNOSIS — E83.110 HEREDITARY HEMOCHROMATOSIS: ICD-10-CM

## 2021-12-28 LAB
BASOPHILS # BLD AUTO: 0.02 10*3/MM3 (ref 0–0.2)
BASOPHILS NFR BLD AUTO: 0.4 % (ref 0–1.5)
DEPRECATED RDW RBC AUTO: 43.9 FL (ref 37–54)
EOSINOPHIL # BLD AUTO: 0.21 10*3/MM3 (ref 0–0.4)
EOSINOPHIL NFR BLD AUTO: 4.2 % (ref 0.3–6.2)
ERYTHROCYTE [DISTWIDTH] IN BLOOD BY AUTOMATED COUNT: 13.6 % (ref 12.3–15.4)
FERRITIN SERPL-MCNC: 44.5 NG/ML (ref 30–400)
HCT VFR BLD AUTO: 41.4 % (ref 37.5–51)
HGB BLD-MCNC: 14 G/DL (ref 13–17.7)
IMM GRANULOCYTES # BLD AUTO: 0.01 10*3/MM3 (ref 0–0.05)
IMM GRANULOCYTES NFR BLD AUTO: 0.2 % (ref 0–0.5)
LYMPHOCYTES # BLD AUTO: 1.78 10*3/MM3 (ref 0.7–3.1)
LYMPHOCYTES NFR BLD AUTO: 35.7 % (ref 19.6–45.3)
MCH RBC QN AUTO: 29.9 PG (ref 26.6–33)
MCHC RBC AUTO-ENTMCNC: 33.8 G/DL (ref 31.5–35.7)
MCV RBC AUTO: 88.5 FL (ref 79–97)
MONOCYTES # BLD AUTO: 0.42 10*3/MM3 (ref 0.1–0.9)
MONOCYTES NFR BLD AUTO: 8.4 % (ref 5–12)
NEUTROPHILS NFR BLD AUTO: 2.55 10*3/MM3 (ref 1.7–7)
NEUTROPHILS NFR BLD AUTO: 51.1 % (ref 42.7–76)
NRBC BLD AUTO-RTO: 0 /100 WBC (ref 0–0.2)
PLATELET # BLD AUTO: 170 10*3/MM3 (ref 140–450)
PMV BLD AUTO: 9.8 FL (ref 6–12)
RBC # BLD AUTO: 4.68 10*6/MM3 (ref 4.14–5.8)
WBC NRBC COR # BLD: 4.99 10*3/MM3 (ref 3.4–10.8)

## 2021-12-28 PROCEDURE — 82728 ASSAY OF FERRITIN: CPT | Performed by: INTERNAL MEDICINE

## 2021-12-28 PROCEDURE — 85025 COMPLETE CBC W/AUTO DIFF WBC: CPT | Performed by: INTERNAL MEDICINE

## 2021-12-28 PROCEDURE — 36415 COLL VENOUS BLD VENIPUNCTURE: CPT

## 2022-01-04 ENCOUNTER — APPOINTMENT (OUTPATIENT)
Dept: ONCOLOGY | Facility: HOSPITAL | Age: 52
End: 2022-01-04

## 2022-01-04 ENCOUNTER — APPOINTMENT (OUTPATIENT)
Dept: OTHER | Facility: HOSPITAL | Age: 52
End: 2022-01-04

## 2022-01-04 ENCOUNTER — TELEMEDICINE (OUTPATIENT)
Dept: ONCOLOGY | Facility: CLINIC | Age: 52
End: 2022-01-04

## 2022-01-04 DIAGNOSIS — E83.110 HEREDITARY HEMOCHROMATOSIS: Primary | ICD-10-CM

## 2022-01-04 PROCEDURE — 99213 OFFICE O/P EST LOW 20 MIN: CPT | Performed by: INTERNAL MEDICINE

## 2022-01-04 NOTE — PROGRESS NOTES
This was an audio and video enabled telemedicine encounter.  Subjective   REASON FOR FOLLOWUP: Hereditary hemochromatosis, undergoing phlebotomy with the goal of getting his ferritin around 50.     HISTORY OF PRESENT ILLNESS:   Mr. Hughes is a pleasant 51 y.o. gentleman with hereditary hemochromatosis, undergoing therapeutic phlebotomy to try to keep his ferritin below 100 and closer to 50, if possible. He has been having his ferritin drawn every 2-3 months.    He looks great in the office today.  His ferritin level from 12/28/2021 was 44 therefore he will not need phlebotomy in the office today and continue checking his labs every 3 months.      History of Present Illness      Past Medical History, Past Surgical History, Social History, Family History have been reviewed and are without significant changes except as mentioned.    Review of Systems   Constitutional: Negative for activity change, appetite change, fatigue, fever and unexpected weight change.   HENT: Negative for hearing loss, nosebleeds, trouble swallowing and voice change.    Eyes: Negative for visual disturbance.   Respiratory: Negative for cough, shortness of breath and wheezing.    Cardiovascular: Negative for chest pain and palpitations.   Gastrointestinal: Negative for abdominal pain, diarrhea, nausea and vomiting.   Genitourinary: Negative for difficulty urinating, frequency, hematuria and urgency.   Musculoskeletal: Negative for back pain and neck pain.   Skin: Negative for rash.   Neurological: Negative for dizziness, seizures, syncope and headaches.   Hematological: Negative for adenopathy. Does not bruise/bleed easily.   Psychiatric/Behavioral: Negative for behavioral problems. The patient is not nervous/anxious.       A comprehensive 14 point review of systems was performed and was negative except as mentioned.    Medications:  The current medication list was reviewed in the EMR    ALLERGIES:    Allergies   Allergen Reactions   • Cefdinir  Hives       Objective      There were no vitals filed for this visit.  Current Status 2/2/2021   ECOG score 0       Physical Exam   Constitutional: He is oriented to person, place, and time. He appears well-developed. No distress.   HENT:   Head: Normocephalic.   Eyes: Pupils are equal, round, and reactive to light. Conjunctivae are normal. No scleral icterus.   Neck: No JVD present. No thyromegaly present.   Cardiovascular: Normal rate and regular rhythm. Exam reveals no gallop and no friction rub.   No murmur heard.  Pulmonary/Chest: Effort normal and breath sounds normal. He has no wheezes. He has no rales.   Abdominal: Soft. He exhibits no distension and no mass. There is no abdominal tenderness.   Musculoskeletal: Normal range of motion. No deformity.   Lymphadenopathy:     He has no cervical adenopathy.   Neurological: He is alert and oriented to person, place, and time. He has normal reflexes. No cranial nerve deficit.   Skin: Skin is warm and dry. No rash noted. No erythema.   Psychiatric: His behavior is normal. Judgment normal.         RECENT LABS:  Hematology WBC   Date Value Ref Range Status   12/28/2021 4.99 3.40 - 10.80 10*3/mm3 Final   08/26/2021 4.84 3.40 - 10.80 10*3/mm3 Final   04/16/2021 4.48 (L) 4.5 - 11.0 10*3/uL Final     RBC   Date Value Ref Range Status   12/28/2021 4.68 4.14 - 5.80 10*6/mm3 Final   08/26/2021 5.02 4.14 - 5.80 10*6/mm3 Final   04/16/2021 4.33 (L) 4.5 - 5.9 10*6/uL Final     Hemoglobin   Date Value Ref Range Status   12/28/2021 14.0 13.0 - 17.7 g/dL Final   04/16/2021 12.8 (L) 13.5 - 17.5 g/dL Final     Hematocrit   Date Value Ref Range Status   12/28/2021 41.4 37.5 - 51.0 % Final   04/16/2021 37.1 (L) 41.0 - 53.0 % Final     Platelets   Date Value Ref Range Status   12/28/2021 170 140 - 450 10*3/mm3 Final   04/16/2021 148 140 - 440 10*3/uL Final            Lab Results   Component Value Date    FERRITIN 44.50 12/28/2021       Assessment/Plan     ASSESSMENT:    1. Hemochromatosis, undergoing therapeutic phlebotomy with the goal of keeping his ferritin around 50.   2. Intermittent mild thrombocytopenia, most likely due to chronic ITP. Platelets were normal ( 170,000) on his labs last week.          PLAN:   1. Mr. Hughes will  be returning every 3 months for lab with R.N. review and phlebotomy p.r.n. for Hct > 50  2. He will see a physician again in the office in 12 months.  Labs will be drawn one week prior to that visit so that he can undergo phlebotomy on the day of the M.D. visit if his ferritin is greater than 50.                1/4/2022      CC:

## 2022-03-28 RX ORDER — SODIUM CHLORIDE 9 MG/ML
250 INJECTION, SOLUTION INTRAVENOUS ONCE
Status: CANCELLED | OUTPATIENT
Start: 2022-03-29

## 2022-03-29 ENCOUNTER — INFUSION (OUTPATIENT)
Dept: ONCOLOGY | Facility: HOSPITAL | Age: 52
End: 2022-03-29

## 2022-03-29 ENCOUNTER — LAB (OUTPATIENT)
Dept: OTHER | Facility: HOSPITAL | Age: 52
End: 2022-03-29

## 2022-03-29 VITALS
OXYGEN SATURATION: 98 % | TEMPERATURE: 97.5 F | HEART RATE: 79 BPM | BODY MASS INDEX: 33.32 KG/M2 | RESPIRATION RATE: 18 BRPM | DIASTOLIC BLOOD PRESSURE: 81 MMHG | SYSTOLIC BLOOD PRESSURE: 119 MMHG | HEIGHT: 71 IN | WEIGHT: 238 LBS

## 2022-03-29 DIAGNOSIS — E83.110 HEREDITARY HEMOCHROMATOSIS: Primary | ICD-10-CM

## 2022-03-29 DIAGNOSIS — E83.110 HEREDITARY HEMOCHROMATOSIS: ICD-10-CM

## 2022-03-29 LAB
BASOPHILS # BLD AUTO: 0.02 10*3/MM3 (ref 0–0.2)
BASOPHILS NFR BLD AUTO: 0.4 % (ref 0–1.5)
DEPRECATED RDW RBC AUTO: 45.9 FL (ref 37–54)
EOSINOPHIL # BLD AUTO: 0.06 10*3/MM3 (ref 0–0.4)
EOSINOPHIL NFR BLD AUTO: 1.2 % (ref 0.3–6.2)
ERYTHROCYTE [DISTWIDTH] IN BLOOD BY AUTOMATED COUNT: 14.1 % (ref 12.3–15.4)
FERRITIN SERPL-MCNC: 71.1 NG/ML (ref 30–400)
HCT VFR BLD AUTO: 41.3 % (ref 37.5–51)
HGB BLD-MCNC: 14 G/DL (ref 13–17.7)
IMM GRANULOCYTES # BLD AUTO: 0.01 10*3/MM3 (ref 0–0.05)
IMM GRANULOCYTES NFR BLD AUTO: 0.2 % (ref 0–0.5)
LYMPHOCYTES # BLD AUTO: 1.69 10*3/MM3 (ref 0.7–3.1)
LYMPHOCYTES NFR BLD AUTO: 35.1 % (ref 19.6–45.3)
MCH RBC QN AUTO: 30 PG (ref 26.6–33)
MCHC RBC AUTO-ENTMCNC: 33.9 G/DL (ref 31.5–35.7)
MCV RBC AUTO: 88.6 FL (ref 79–97)
MONOCYTES # BLD AUTO: 0.38 10*3/MM3 (ref 0.1–0.9)
MONOCYTES NFR BLD AUTO: 7.9 % (ref 5–12)
NEUTROPHILS NFR BLD AUTO: 2.65 10*3/MM3 (ref 1.7–7)
NEUTROPHILS NFR BLD AUTO: 55.2 % (ref 42.7–76)
NRBC BLD AUTO-RTO: 0 /100 WBC (ref 0–0.2)
PLATELET # BLD AUTO: 157 10*3/MM3 (ref 140–450)
PMV BLD AUTO: 9.6 FL (ref 6–12)
RBC # BLD AUTO: 4.66 10*6/MM3 (ref 4.14–5.8)
WBC NRBC COR # BLD: 4.81 10*3/MM3 (ref 3.4–10.8)

## 2022-03-29 PROCEDURE — 99195 PHLEBOTOMY: CPT

## 2022-03-29 PROCEDURE — 82728 ASSAY OF FERRITIN: CPT | Performed by: INTERNAL MEDICINE

## 2022-03-29 PROCEDURE — 85025 COMPLETE CBC W/AUTO DIFF WBC: CPT | Performed by: INTERNAL MEDICINE

## 2022-03-29 PROCEDURE — 36415 COLL VENOUS BLD VENIPUNCTURE: CPT

## 2022-03-29 RX ORDER — SODIUM CHLORIDE 9 MG/ML
250 INJECTION, SOLUTION INTRAVENOUS ONCE
Status: CANCELLED | OUTPATIENT
Start: 2022-04-05

## 2022-03-30 DIAGNOSIS — K22.719 BARRETT'S ESOPHAGUS WITH DYSPLASIA: ICD-10-CM

## 2022-03-30 RX ORDER — OMEPRAZOLE 40 MG/1
40 CAPSULE, DELAYED RELEASE ORAL DAILY
Qty: 90 CAPSULE | Refills: 3 | Status: SHIPPED | OUTPATIENT
Start: 2022-03-30

## 2022-05-13 DIAGNOSIS — I10 ESSENTIAL HYPERTENSION: ICD-10-CM

## 2022-05-13 DIAGNOSIS — R73.01 IFG (IMPAIRED FASTING GLUCOSE): Primary | ICD-10-CM

## 2022-05-18 LAB
ALBUMIN SERPL-MCNC: 4.8 G/DL (ref 3.8–4.9)
ALBUMIN/GLOB SERPL: 1.8 {RATIO} (ref 1.2–2.2)
ALP SERPL-CCNC: 77 IU/L (ref 44–121)
ALT SERPL-CCNC: 37 IU/L (ref 0–44)
AST SERPL-CCNC: 26 IU/L (ref 0–40)
BILIRUB SERPL-MCNC: 1 MG/DL (ref 0–1.2)
BUN SERPL-MCNC: 15 MG/DL (ref 6–24)
BUN/CREAT SERPL: 15 (ref 9–20)
CALCIUM SERPL-MCNC: 9.4 MG/DL (ref 8.7–10.2)
CHLORIDE SERPL-SCNC: 101 MMOL/L (ref 96–106)
CO2 SERPL-SCNC: 22 MMOL/L (ref 20–29)
CREAT SERPL-MCNC: 0.98 MG/DL (ref 0.76–1.27)
EGFRCR SERPLBLD CKD-EPI 2021: 92 ML/MIN/1.73
GLOBULIN SER CALC-MCNC: 2.6 G/DL (ref 1.5–4.5)
GLUCOSE SERPL-MCNC: 122 MG/DL (ref 65–99)
HBA1C MFR BLD: 5.2 % (ref 4.8–5.6)
POTASSIUM SERPL-SCNC: 4.3 MMOL/L (ref 3.5–5.2)
PROT SERPL-MCNC: 7.4 G/DL (ref 6–8.5)
SODIUM SERPL-SCNC: 137 MMOL/L (ref 134–144)

## 2022-05-23 ENCOUNTER — OFFICE VISIT (OUTPATIENT)
Dept: INTERNAL MEDICINE | Facility: CLINIC | Age: 52
End: 2022-05-23

## 2022-05-23 VITALS
DIASTOLIC BLOOD PRESSURE: 80 MMHG | TEMPERATURE: 96.8 F | BODY MASS INDEX: 32.62 KG/M2 | SYSTOLIC BLOOD PRESSURE: 130 MMHG | HEIGHT: 71 IN | WEIGHT: 233 LBS | OXYGEN SATURATION: 97 % | HEART RATE: 97 BPM

## 2022-05-23 DIAGNOSIS — R73.01 IFG (IMPAIRED FASTING GLUCOSE): ICD-10-CM

## 2022-05-23 DIAGNOSIS — Z00.00 HEALTHCARE MAINTENANCE: ICD-10-CM

## 2022-05-23 DIAGNOSIS — I10 ESSENTIAL HYPERTENSION: Primary | ICD-10-CM

## 2022-05-23 DIAGNOSIS — E88.81 METABOLIC SYNDROME: ICD-10-CM

## 2022-05-23 DIAGNOSIS — E78.5 DYSLIPIDEMIA: ICD-10-CM

## 2022-05-23 DIAGNOSIS — E66.9 OBESITY (BMI 30-39.9): ICD-10-CM

## 2022-05-23 PROCEDURE — 99214 OFFICE O/P EST MOD 30 MIN: CPT | Performed by: INTERNAL MEDICINE

## 2022-05-23 NOTE — PROGRESS NOTES
Hypertension      HPI  Matias Hughes is a 51 y.o. male RTC in f/u:  Has been doing well. Is really busy with work. Health has 'been pretty good'.  Feeling more tired of late than usual, not sure why.  No allergy issues.    1. HTN - new dx in 2019, was controlled on losartan to 50mg at BID dosing but compliance was a challenge. Now on daily telmisartan 80mg daily. Takes every AM and is having no issues with compliance here. Cuff at home. Getting ~120's/ 70-80's. Mild dizzy issues lately, notes more like when bends over playing golf. Does not really notice other times.   2. IFG/ Obesity, new Dx metabolic syndrome - weight has been 'about the same'. Exercise is 'a lot of walking'.  Working on walking challenge at wife's work. Work has been busy so hard to do much more. Golf is 2x/ week.   3. Dyslipidemia, low HDL; Metabolic Syndrome - on Atorvastatin and tolerating but some vague myalgia sx noted. Stared on CoQ 10 and notes 'not sure if much better'.  Feels like has cramps in legs, toes and feet.  No issues prior to statin.  Never been on alternate statin. Is not pleased with constant leg cramping issues.   4. HM - not had COVID booster #2.     Review of Systems   Constitutional: Negative for chills, fever and weight loss.   Cardiovascular: Negative for chest pain, dyspnea on exertion, irregular heartbeat, leg swelling, near-syncope, palpitations and syncope.   Respiratory: Negative for shortness of breath.    Musculoskeletal: Positive for muscle cramps and myalgias (cramps > myalgias). Negative for muscle weakness.   Neurological: Negative for dizziness and light-headedness.       The following portions of the patient's history were reviewed and updated as appropriate: allergies, current medications, past medical history, past social history and problem list.      Current Outpatient Medications:   •  cholecalciferol (VITAMIN D3) 1000 units tablet, Take 1 tablet by mouth Daily., Disp: 30 tablet, Rfl: 5  •  Coenzyme  "Q10 (CoQ10) 50 MG capsule, Take 50 mg by mouth Daily., Disp: 30 capsule, Rfl:   •  metroNIDAZOLE (Metrogel) 1 % gel, Apply  topically to the appropriate area as directed Daily., Disp: 60 g, Rfl: 2  •  omeprazole (priLOSEC) 40 MG capsule, Take 1 capsule by mouth Daily., Disp: 90 capsule, Rfl: 3  •  psyllium (METAMUCIL) 58.6 % powder, Take  by mouth Daily., Disp: , Rfl: 12  •  telmisartan (MICARDIS) 80 MG tablet, Take 1 tablet by mouth Daily., Disp: 90 tablet, Rfl: 2    Vitals:    05/23/22 0810   BP: 130/80   Pulse: 97   Temp: 96.8 °F (36 °C)   SpO2: 97%   Weight: 106 kg (233 lb)   Height: 180.3 cm (71\")     Body mass index is 32.5 kg/m².      Physical Exam  Vitals reviewed.   Constitutional:       General: He is not in acute distress.     Appearance: He is well-developed. He is obese. He is not ill-appearing or toxic-appearing.   HENT:      Head: Normocephalic and atraumatic.      Mouth/Throat:      Mouth: Mucous membranes are moist. No oral lesions.      Tongue: No lesions.      Pharynx: No pharyngeal swelling, oropharyngeal exudate, posterior oropharyngeal erythema or uvula swelling.   Eyes:      General: No scleral icterus.     Conjunctiva/sclera: Conjunctivae normal.      Pupils: Pupils are equal, round, and reactive to light.   Neck:      Vascular: No carotid bruit.   Cardiovascular:      Rate and Rhythm: Normal rate and regular rhythm.      Pulses:           Carotid pulses are 2+ on the right side and 2+ on the left side.       Radial pulses are 2+ on the right side and 2+ on the left side.      Heart sounds: Normal heart sounds.   Pulmonary:      Effort: Pulmonary effort is normal. No respiratory distress.      Breath sounds: Normal breath sounds. No wheezing, rhonchi or rales.   Musculoskeletal:      Cervical back: Normal range of motion and neck supple. No muscular tenderness.      Right lower leg: No edema.      Left lower leg: No edema.   Lymphadenopathy:      Cervical: No cervical adenopathy. "   Neurological:      Mental Status: He is alert and oriented to person, place, and time.      Cranial Nerves: No cranial nerve deficit.      Gait: Gait normal.   Psychiatric:         Attention and Perception: Attention normal.         Mood and Affect: Mood and affect normal.         Behavior: Behavior normal.         Thought Content: Thought content normal.         Assessment/ Plan  Diagnoses and all orders for this visit:    Essential hypertension    Metabolic syndrome    Obesity (BMI 30-39.9)    IFG (impaired fasting glucose)    Healthcare maintenance    Dyslipidemia        Return in about 6 months (around 11/23/2022) for Annual physical.      Discussion:  Matias Hughes is a 51 y.o. male RTC in f/u:    1. HTN - new dx in 2019, was controlled on losartan to 50mg at BID dosing but compliance was a challenge. Now controlled on daily telmisartan 80mg daily. Good home log. BMP OK.  2. IFG/ Obesity, new Dx metabolic syndrome - weight stable, exercise trends stable.  FBS still issue, despite normal A1C.  Exercise augmentation advised. Will trend numbers.   3. Dyslipidemia, low HDL; Metabolic Syndrome - not tolerating on Atorvastatin due muscle aching and myalgias.  Will stop med today and f/u via phone after washout period of at least 2 weeks. If sx improved off med, will look toward alternate statin, rosuvastatin 20mg daily.  C/W CoQ10 for now.   4. HM - COVID booster #2 advised, pt agrees to obtain.     RTC 6 months CPE, F labs prior    Answers for HPI/ROS submitted by the patient on 5/22/2022  What is the primary reason for your visit?: Physical

## 2022-06-06 ENCOUNTER — TELEPHONE (OUTPATIENT)
Dept: INTERNAL MEDICINE | Facility: CLINIC | Age: 52
End: 2022-06-06

## 2022-06-06 DIAGNOSIS — E78.5 DYSLIPIDEMIA: Primary | ICD-10-CM

## 2022-06-06 NOTE — TELEPHONE ENCOUNTER
Following up on our last visit.   How are sx off atorvastatin? Considering Crestor start if sx resolved off atorvastatin.

## 2022-06-07 RX ORDER — ROSUVASTATIN CALCIUM 20 MG/1
20 TABLET, COATED ORAL DAILY
Qty: 90 TABLET | Refills: 2 | Status: SHIPPED | OUTPATIENT
Start: 2022-06-07 | End: 2022-08-05 | Stop reason: ALTCHOICE

## 2022-06-07 NOTE — TELEPHONE ENCOUNTER
Noted. Will start alternate statin, rosuvastatin 20mg daily.  C/W CoQ10.   Trend lipids and LFT's next labs. Call if mylagia sx return or any other issues with med.

## 2022-06-21 ENCOUNTER — INFUSION (OUTPATIENT)
Dept: ONCOLOGY | Facility: HOSPITAL | Age: 52
End: 2022-06-21

## 2022-06-21 ENCOUNTER — LAB (OUTPATIENT)
Dept: OTHER | Facility: HOSPITAL | Age: 52
End: 2022-06-21

## 2022-06-21 VITALS
SYSTOLIC BLOOD PRESSURE: 106 MMHG | BODY MASS INDEX: 32.9 KG/M2 | RESPIRATION RATE: 18 BRPM | HEIGHT: 71 IN | WEIGHT: 235 LBS | TEMPERATURE: 97.6 F | HEART RATE: 67 BPM | DIASTOLIC BLOOD PRESSURE: 70 MMHG | OXYGEN SATURATION: 98 %

## 2022-06-21 DIAGNOSIS — E83.110 HEREDITARY HEMOCHROMATOSIS: ICD-10-CM

## 2022-06-21 DIAGNOSIS — E83.110 HEREDITARY HEMOCHROMATOSIS: Primary | ICD-10-CM

## 2022-06-21 LAB
ALBUMIN SERPL-MCNC: 4.7 G/DL (ref 3.5–5.2)
ALBUMIN/GLOB SERPL: 1.7 G/DL
ALP SERPL-CCNC: 74 U/L (ref 39–117)
ALT SERPL W P-5'-P-CCNC: 39 U/L (ref 1–41)
ANION GAP SERPL CALCULATED.3IONS-SCNC: 7.9 MMOL/L (ref 5–15)
AST SERPL-CCNC: 27 U/L (ref 1–40)
BASOPHILS # BLD AUTO: 0.02 10*3/MM3 (ref 0–0.2)
BASOPHILS NFR BLD AUTO: 0.4 % (ref 0–1.5)
BILIRUB SERPL-MCNC: 0.6 MG/DL (ref 0–1.2)
BUN SERPL-MCNC: 17 MG/DL (ref 6–20)
BUN/CREAT SERPL: 16.7 (ref 7–25)
CALCIUM SPEC-SCNC: 9.4 MG/DL (ref 8.6–10.5)
CHLORIDE SERPL-SCNC: 101 MMOL/L (ref 98–107)
CO2 SERPL-SCNC: 28.1 MMOL/L (ref 22–29)
CREAT SERPL-MCNC: 1.02 MG/DL (ref 0.76–1.27)
DEPRECATED RDW RBC AUTO: 45.1 FL (ref 37–54)
EGFRCR SERPLBLD CKD-EPI 2021: 87.9 ML/MIN/1.73
EOSINOPHIL # BLD AUTO: 0.07 10*3/MM3 (ref 0–0.4)
EOSINOPHIL NFR BLD AUTO: 1.4 % (ref 0.3–6.2)
ERYTHROCYTE [DISTWIDTH] IN BLOOD BY AUTOMATED COUNT: 13.9 % (ref 12.3–15.4)
FERRITIN SERPL-MCNC: 72.9 NG/ML (ref 30–400)
GLOBULIN UR ELPH-MCNC: 2.7 GM/DL
GLUCOSE SERPL-MCNC: 103 MG/DL (ref 65–99)
HCT VFR BLD AUTO: 40.4 % (ref 37.5–51)
HGB BLD-MCNC: 13.6 G/DL (ref 13–17.7)
IMM GRANULOCYTES # BLD AUTO: 0.01 10*3/MM3 (ref 0–0.05)
IMM GRANULOCYTES NFR BLD AUTO: 0.2 % (ref 0–0.5)
LYMPHOCYTES # BLD AUTO: 1.75 10*3/MM3 (ref 0.7–3.1)
LYMPHOCYTES NFR BLD AUTO: 36.1 % (ref 19.6–45.3)
MCH RBC QN AUTO: 30 PG (ref 26.6–33)
MCHC RBC AUTO-ENTMCNC: 33.7 G/DL (ref 31.5–35.7)
MCV RBC AUTO: 89 FL (ref 79–97)
MONOCYTES # BLD AUTO: 0.37 10*3/MM3 (ref 0.1–0.9)
MONOCYTES NFR BLD AUTO: 7.6 % (ref 5–12)
NEUTROPHILS NFR BLD AUTO: 2.63 10*3/MM3 (ref 1.7–7)
NEUTROPHILS NFR BLD AUTO: 54.3 % (ref 42.7–76)
NRBC BLD AUTO-RTO: 0 /100 WBC (ref 0–0.2)
PLATELET # BLD AUTO: 174 10*3/MM3 (ref 140–450)
PMV BLD AUTO: 9.9 FL (ref 6–12)
POTASSIUM SERPL-SCNC: 4.3 MMOL/L (ref 3.5–5.2)
PROT SERPL-MCNC: 7.4 G/DL (ref 6–8.5)
RBC # BLD AUTO: 4.54 10*6/MM3 (ref 4.14–5.8)
SODIUM SERPL-SCNC: 137 MMOL/L (ref 136–145)
WBC NRBC COR # BLD: 4.85 10*3/MM3 (ref 3.4–10.8)

## 2022-06-21 PROCEDURE — 80053 COMPREHEN METABOLIC PANEL: CPT | Performed by: INTERNAL MEDICINE

## 2022-06-21 PROCEDURE — 82728 ASSAY OF FERRITIN: CPT | Performed by: INTERNAL MEDICINE

## 2022-06-21 PROCEDURE — 36415 COLL VENOUS BLD VENIPUNCTURE: CPT

## 2022-06-21 PROCEDURE — 99195 PHLEBOTOMY: CPT

## 2022-06-21 PROCEDURE — 85025 COMPLETE CBC W/AUTO DIFF WBC: CPT | Performed by: INTERNAL MEDICINE

## 2022-06-21 PROCEDURE — 96361 HYDRATE IV INFUSION ADD-ON: CPT

## 2022-06-21 RX ORDER — SODIUM CHLORIDE 9 MG/ML
250 INJECTION, SOLUTION INTRAVENOUS ONCE
Status: COMPLETED | OUTPATIENT
Start: 2022-06-21 | End: 2022-06-21

## 2022-06-21 RX ORDER — SODIUM CHLORIDE 9 MG/ML
250 INJECTION, SOLUTION INTRAVENOUS ONCE
Status: CANCELLED | OUTPATIENT
Start: 2022-06-28

## 2022-06-21 RX ADMIN — SODIUM CHLORIDE 250 ML: 9 INJECTION, SOLUTION INTRAVENOUS at 16:30

## 2022-08-05 ENCOUNTER — PATIENT MESSAGE (OUTPATIENT)
Dept: INTERNAL MEDICINE | Facility: CLINIC | Age: 52
End: 2022-08-05

## 2022-08-05 DIAGNOSIS — E78.5 DYSLIPIDEMIA: ICD-10-CM

## 2022-08-05 NOTE — TELEPHONE ENCOUNTER
From: Matias Hughes  To: Bernard Souza MD  Sent: 8/5/2022 12:40 PM EDT  Subject: Statin    I am interested in trying a different statin. Unhappy with aches and pains and the way it makes me feel.

## 2022-08-12 DIAGNOSIS — I10 ESSENTIAL HYPERTENSION: ICD-10-CM

## 2022-08-12 RX ORDER — TELMISARTAN 80 MG/1
80 TABLET ORAL DAILY
Qty: 90 TABLET | Refills: 2 | Status: SHIPPED | OUTPATIENT
Start: 2022-08-12

## 2022-09-13 ENCOUNTER — INFUSION (OUTPATIENT)
Dept: ONCOLOGY | Facility: HOSPITAL | Age: 52
End: 2022-09-13

## 2022-09-13 ENCOUNTER — LAB (OUTPATIENT)
Dept: OTHER | Facility: HOSPITAL | Age: 52
End: 2022-09-13

## 2022-09-13 VITALS
RESPIRATION RATE: 18 BRPM | OXYGEN SATURATION: 99 % | SYSTOLIC BLOOD PRESSURE: 118 MMHG | DIASTOLIC BLOOD PRESSURE: 73 MMHG | HEIGHT: 71 IN | HEART RATE: 74 BPM | BODY MASS INDEX: 33.21 KG/M2 | TEMPERATURE: 97 F | WEIGHT: 237.2 LBS

## 2022-09-13 DIAGNOSIS — E83.110 HEREDITARY HEMOCHROMATOSIS: ICD-10-CM

## 2022-09-13 LAB
BASOPHILS # BLD AUTO: 0.02 10*3/MM3 (ref 0–0.2)
BASOPHILS NFR BLD AUTO: 0.4 % (ref 0–1.5)
DEPRECATED RDW RBC AUTO: 45.4 FL (ref 37–54)
EOSINOPHIL # BLD AUTO: 0.01 10*3/MM3 (ref 0–0.4)
EOSINOPHIL NFR BLD AUTO: 0.2 % (ref 0.3–6.2)
ERYTHROCYTE [DISTWIDTH] IN BLOOD BY AUTOMATED COUNT: 14.1 % (ref 12.3–15.4)
FERRITIN SERPL-MCNC: 32.5 NG/ML (ref 30–400)
HCT VFR BLD AUTO: 42.3 % (ref 37.5–51)
HGB BLD-MCNC: 14.3 G/DL (ref 13–17.7)
IMM GRANULOCYTES # BLD AUTO: 0.01 10*3/MM3 (ref 0–0.05)
IMM GRANULOCYTES NFR BLD AUTO: 0.2 % (ref 0–0.5)
LYMPHOCYTES # BLD AUTO: 1.58 10*3/MM3 (ref 0.7–3.1)
LYMPHOCYTES NFR BLD AUTO: 31.1 % (ref 19.6–45.3)
MCH RBC QN AUTO: 30.2 PG (ref 26.6–33)
MCHC RBC AUTO-ENTMCNC: 33.8 G/DL (ref 31.5–35.7)
MCV RBC AUTO: 89.4 FL (ref 79–97)
MONOCYTES # BLD AUTO: 0.38 10*3/MM3 (ref 0.1–0.9)
MONOCYTES NFR BLD AUTO: 7.5 % (ref 5–12)
NEUTROPHILS NFR BLD AUTO: 3.08 10*3/MM3 (ref 1.7–7)
NEUTROPHILS NFR BLD AUTO: 60.6 % (ref 42.7–76)
NRBC BLD AUTO-RTO: 0 /100 WBC (ref 0–0.2)
PLATELET # BLD AUTO: 181 10*3/MM3 (ref 140–450)
PMV BLD AUTO: 9.7 FL (ref 6–12)
RBC # BLD AUTO: 4.73 10*6/MM3 (ref 4.14–5.8)
WBC NRBC COR # BLD: 5.08 10*3/MM3 (ref 3.4–10.8)

## 2022-09-13 PROCEDURE — 85025 COMPLETE CBC W/AUTO DIFF WBC: CPT | Performed by: INTERNAL MEDICINE

## 2022-09-13 PROCEDURE — 36415 COLL VENOUS BLD VENIPUNCTURE: CPT

## 2022-09-13 PROCEDURE — 82728 ASSAY OF FERRITIN: CPT | Performed by: INTERNAL MEDICINE

## 2022-09-13 NOTE — NURSING NOTE
Pt here for possible phlebotomy. Ferritin level 32.5 and does not meet parameters for phlebotomy. Copy of labs given to pt and f/u appt reviewed. Instructed to call the office for any concerns prior to next visit. Pt patsy.     Lab Results   Component Value Date    FERRITIN 32.50 09/13/2022

## 2022-11-18 DIAGNOSIS — E78.5 DYSLIPIDEMIA: ICD-10-CM

## 2022-11-18 DIAGNOSIS — R73.01 IFG (IMPAIRED FASTING GLUCOSE): ICD-10-CM

## 2022-11-18 DIAGNOSIS — Z00.00 ANNUAL PHYSICAL EXAM: Primary | ICD-10-CM

## 2022-11-18 DIAGNOSIS — I10 ESSENTIAL HYPERTENSION: ICD-10-CM

## 2022-11-23 LAB
ALBUMIN SERPL-MCNC: 4.9 G/DL (ref 3.5–5.2)
ALBUMIN/CREAT UR: 3 MG/G CREAT (ref 0–29)
ALBUMIN/GLOB SERPL: 2.3 G/DL
ALP SERPL-CCNC: 76 U/L (ref 39–117)
ALT SERPL-CCNC: 45 U/L (ref 1–41)
APPEARANCE UR: CLEAR
AST SERPL-CCNC: 23 U/L (ref 1–40)
BACTERIA #/AREA URNS HPF: NORMAL /HPF
BASOPHILS # BLD AUTO: 0.02 10*3/MM3 (ref 0–0.2)
BASOPHILS NFR BLD AUTO: 0.4 % (ref 0–1.5)
BILIRUB SERPL-MCNC: 0.5 MG/DL (ref 0–1.2)
BILIRUB UR QL STRIP: NEGATIVE
BUN SERPL-MCNC: 15 MG/DL (ref 6–20)
BUN/CREAT SERPL: 13.6 (ref 7–25)
CALCIUM SERPL-MCNC: 9.6 MG/DL (ref 8.6–10.5)
CASTS URNS QL MICRO: NORMAL /LPF
CHLORIDE SERPL-SCNC: 100 MMOL/L (ref 98–107)
CHOLEST SERPL-MCNC: 94 MG/DL (ref 0–200)
CO2 SERPL-SCNC: 28 MMOL/L (ref 22–29)
COLOR UR: YELLOW
CREAT SERPL-MCNC: 1.1 MG/DL (ref 0.76–1.27)
CREAT UR-MCNC: 235.5 MG/DL
EGFRCR SERPLBLD CKD-EPI 2021: 79.8 ML/MIN/1.73
EOSINOPHIL # BLD AUTO: 0.1 10*3/MM3 (ref 0–0.4)
EOSINOPHIL NFR BLD AUTO: 1.8 % (ref 0.3–6.2)
EPI CELLS #/AREA URNS HPF: NORMAL /HPF (ref 0–10)
ERYTHROCYTE [DISTWIDTH] IN BLOOD BY AUTOMATED COUNT: 13.8 % (ref 12.3–15.4)
GLOBULIN SER CALC-MCNC: 2.1 GM/DL
GLUCOSE SERPL-MCNC: 105 MG/DL (ref 65–99)
GLUCOSE UR QL STRIP: NEGATIVE
HBA1C MFR BLD: 5.1 % (ref 4.8–5.6)
HCT VFR BLD AUTO: 41.8 % (ref 37.5–51)
HDLC SERPL-MCNC: 33 MG/DL (ref 40–60)
HGB BLD-MCNC: 14.1 G/DL (ref 13–17.7)
HGB UR QL STRIP: NEGATIVE
IMM GRANULOCYTES # BLD AUTO: 0.01 10*3/MM3 (ref 0–0.05)
IMM GRANULOCYTES NFR BLD AUTO: 0.2 % (ref 0–0.5)
KETONES UR QL STRIP: NEGATIVE
LDLC SERPL CALC-MCNC: 40 MG/DL (ref 0–100)
LEUKOCYTE ESTERASE UR QL STRIP: NEGATIVE
LYMPHOCYTES # BLD AUTO: 1.86 10*3/MM3 (ref 0.7–3.1)
LYMPHOCYTES NFR BLD AUTO: 33.8 % (ref 19.6–45.3)
MCH RBC QN AUTO: 30.5 PG (ref 26.6–33)
MCHC RBC AUTO-ENTMCNC: 33.7 G/DL (ref 31.5–35.7)
MCV RBC AUTO: 90.5 FL (ref 79–97)
MICRO URNS: NORMAL
MICRO URNS: NORMAL
MICROALBUMIN UR-MCNC: 6.8 UG/ML
MONOCYTES # BLD AUTO: 0.45 10*3/MM3 (ref 0.1–0.9)
MONOCYTES NFR BLD AUTO: 8.2 % (ref 5–12)
NEUTROPHILS # BLD AUTO: 3.07 10*3/MM3 (ref 1.7–7)
NEUTROPHILS NFR BLD AUTO: 55.6 % (ref 42.7–76)
NITRITE UR QL STRIP: NEGATIVE
NRBC BLD AUTO-RTO: 0 /100 WBC (ref 0–0.2)
PH UR STRIP: 5.5 [PH] (ref 5–7.5)
PLATELET # BLD AUTO: 196 10*3/MM3 (ref 140–450)
POTASSIUM SERPL-SCNC: 4.5 MMOL/L (ref 3.5–5.2)
PROT SERPL-MCNC: 7 G/DL (ref 6–8.5)
PROT UR QL STRIP: NEGATIVE
RBC # BLD AUTO: 4.62 10*6/MM3 (ref 4.14–5.8)
RBC #/AREA URNS HPF: NORMAL /HPF (ref 0–2)
SODIUM SERPL-SCNC: 138 MMOL/L (ref 136–145)
SP GR UR STRIP: 1.03 (ref 1–1.03)
TRIGL SERPL-MCNC: 115 MG/DL (ref 0–150)
TSH SERPL DL<=0.005 MIU/L-ACNC: 2.31 UIU/ML (ref 0.27–4.2)
URINALYSIS REFLEX: NORMAL
UROBILINOGEN UR STRIP-MCNC: 0.2 MG/DL (ref 0.2–1)
VLDLC SERPL CALC-MCNC: 21 MG/DL (ref 5–40)
WBC # BLD AUTO: 5.51 10*3/MM3 (ref 3.4–10.8)
WBC #/AREA URNS HPF: NORMAL /HPF (ref 0–5)

## 2022-11-29 ENCOUNTER — OFFICE VISIT (OUTPATIENT)
Dept: INTERNAL MEDICINE | Facility: CLINIC | Age: 52
End: 2022-11-29

## 2022-11-29 VITALS
TEMPERATURE: 98.2 F | SYSTOLIC BLOOD PRESSURE: 108 MMHG | HEIGHT: 71 IN | HEART RATE: 95 BPM | WEIGHT: 229.1 LBS | DIASTOLIC BLOOD PRESSURE: 78 MMHG | BODY MASS INDEX: 32.07 KG/M2 | OXYGEN SATURATION: 99 %

## 2022-11-29 DIAGNOSIS — K21.9 GASTROESOPHAGEAL REFLUX DISEASE WITHOUT ESOPHAGITIS: ICD-10-CM

## 2022-11-29 DIAGNOSIS — K22.719 BARRETT'S ESOPHAGUS WITH DYSPLASIA: ICD-10-CM

## 2022-11-29 DIAGNOSIS — E83.110 HEREDITARY HEMOCHROMATOSIS: ICD-10-CM

## 2022-11-29 DIAGNOSIS — K76.0 FATTY LIVER: ICD-10-CM

## 2022-11-29 DIAGNOSIS — R73.01 IFG (IMPAIRED FASTING GLUCOSE): ICD-10-CM

## 2022-11-29 DIAGNOSIS — Z00.01 ENCOUNTER FOR GENERAL ADULT MEDICAL EXAMINATION WITH ABNORMAL FINDINGS: Primary | ICD-10-CM

## 2022-11-29 DIAGNOSIS — E55.9 AVITAMINOSIS D: ICD-10-CM

## 2022-11-29 DIAGNOSIS — E78.5 DYSLIPIDEMIA: ICD-10-CM

## 2022-11-29 DIAGNOSIS — K57.30 DIVERTICULOSIS OF LARGE INTESTINE WITHOUT HEMORRHAGE: ICD-10-CM

## 2022-11-29 DIAGNOSIS — E66.9 OBESITY (BMI 30-39.9): ICD-10-CM

## 2022-11-29 DIAGNOSIS — C44.92 SQUAMOUS CELL CARCINOMA OF SKIN: ICD-10-CM

## 2022-11-29 DIAGNOSIS — E88.81 METABOLIC SYNDROME: ICD-10-CM

## 2022-11-29 DIAGNOSIS — L71.9 ROSACEA: ICD-10-CM

## 2022-11-29 DIAGNOSIS — I10 ESSENTIAL HYPERTENSION: ICD-10-CM

## 2022-11-29 PROCEDURE — 99396 PREV VISIT EST AGE 40-64: CPT | Performed by: INTERNAL MEDICINE

## 2022-11-29 NOTE — PROGRESS NOTES
Annual Exam (CPE. Review of Medical Issues)      HPI  Matias Hughes is a 52 y.o. male RTC in yearly CPE, review of medical issues:  Hs been doing well.  Had a gastroenteritis a few weeks ago, has largely resolved.  Mostly diarrhea based sx, only initial vomiting. Seen in ED  And had CT scan. Was not SBO, worried well, and ED ruled out.   1. HTN - new dx in 2019, was controlled telmisartan 80mg daily. Has been working on reducing sweets/ weight loss.  Back volleyball once weekly.   2. IFG/ Obesity, new Dx metabolic syndrome - weight down a bit. Walking some and playing volleyball one weekly. Really working on cutting down on sweets. Is mindful of diet, 'we are trying to eat healthier'.  Eating less red meat.   3. Dyslipidemia, low HDL; Metabolic Syndrome - not tolerated Atorvastatin due muscle aching and myalgias. Tolerating Livalo well, 'fairly benign' with med. Good tolerance. Curious about lab results on new med.   4. Hx of SBO related to adhesions from gallbladder surgery adhesions; 2013; 2015; 2016 with recent inpt stay at [facility] for recurrent SBO 4/13-4/16/21 - s/p eval at  for eval and CT enteroclysis, results reviewed today with mild enteroperitoneal adhesions in R lower and L midabdomen. S/p eval with surgery and no intervention advised.  Resolved recent gastroeneritis.  Was not SBO on CT.   5. Hemachromatosis - Heme eval ~4x/year.  Often will get phlebotomy, but last time did not need one. Goes next week.     6. Reflux esophagitis/ Denis's Esophagus repeat  EGD 11/2018 --> repeat 3 years, appt upcoming - sx controlled on 40 mg Omeprazole.   7. Vitamin D deficiency -  on 1000 I.U. Vitamin D3 OTC some day. 'I need to get back that'.   8. Squamous Cell CA of skin ~2014 - sees derm q 6 months, seen last 8/2022. No new lesions.   9. Rosacea - off minocycline for years now. Really no issues right now. Not sure last time needed to use metro gel.     Review of Systems   Constitutional: Positive for weight loss  (a few pounds, intentional). Negative for chills, fever and malaise/fatigue.   HENT: Negative for congestion, hearing loss, odynophagia and sore throat.    Eyes: Negative for discharge, double vision, pain and redness.        Last eye exam ~11/2022; wears contacts     Cardiovascular: Negative for chest pain, dyspnea on exertion, irregular heartbeat, leg swelling, near-syncope, palpitations and syncope.   Respiratory: Negative for cough, shortness of breath, sleep disturbances due to breathing and snoring.    Endocrine: Negative for polydipsia, polyphagia and polyuria.   Hematologic/Lymphatic: Negative for bleeding problem. Does not bruise/bleed easily.   Skin: Negative for rash and suspicious lesions.   Musculoskeletal: Negative for arthritis, joint pain, joint swelling, muscle cramps, muscle weakness and myalgias.   Gastrointestinal: Negative for constipation, diarrhea (resolved), dysphagia, heartburn, nausea and vomiting.   Genitourinary: Negative for dysuria, frequency, hematuria, hesitancy and incomplete emptying.   Neurological: Negative for excessive daytime sleepiness, dizziness, headaches and light-headedness.   Psychiatric/Behavioral: Negative for depression. The patient does not have insomnia and is not nervous/anxious.    Allergic/Immunologic: Negative for environmental allergies and persistent infections.       Problem List:    Patient Active Problem List   Diagnosis   • Hemochromatosis   • Squamous cell carcinoma of skin   • Avitaminosis D   • Rosacea   • Dyslipidemia   • IFG (impaired fasting glucose)   • Obesity (BMI 30-39.9)   • Fatty liver   • Ventral hernia without obstruction or gangrene   • Denis's esophagus with dysplasia   • Peptic ulcer disease   • Diverticulosis of large intestine without hemorrhage   • Upper back pain, chronic   • Essential hypertension   • Gastroesophageal reflux disease   • Metabolic syndrome       Medical History:    Past Medical History:   Diagnosis Date   • Acute  prostatitis 10/11/2018    10/2018; on background of chronic prostatitis    • Diverticulosis    • Diverticulosis of large intestine without hemorrhage 11/2/2018   • GERD (gastroesophageal reflux disease)    • H/O hypogonadism    • Hemochromatosis     heterozygous for [id number] mutation hx elevated LFT's Ferritin elevated at dx   • History of cholelithiasis    • History of chronic prostatitis     2010 tx'd by Dr Cooper   • History of Clostridium difficile colitis     Hospitalized June 2013; with SBO   • History of small bowel obstruction     related to adhesions from gallbladder surgery adhesions; 2013; 2015; 2016   • Overweight    • Prehypertension    • Rosacea 6/2/2016   • Squamous cell carcinoma of skin     2012 sees derm q 6 months (Dr. Mitchell)   • Vitamin D deficiency         Social History:    Social History     Socioeconomic History   • Marital status:      Spouse name: Shante   • Number of children: 2   • Years of education: College   Tobacco Use   • Smoking status: Never   • Smokeless tobacco: Never   Vaping Use   • Vaping Use: Never used   Substance and Sexual Activity   • Alcohol use: Yes     Alcohol/week: 5.0 standard drinks     Types: 5 Cans of beer per week     Comment: 2-5 drinks/  week   • Drug use: No   • Sexual activity: Yes     Partners: Female     Birth control/protection: None     Comment: wife only; no hx STD's       Family History:   Family History   Problem Relation Age of Onset   • Hypothyroidism Mother    • Rheum arthritis Maternal Aunt    • Breast cancer Maternal Aunt    • Glaucoma Paternal Grandmother    • Breast cancer Maternal Grandmother    • Migraines Daughter    • Seizures Daughter         Epilepsy   • Migraines Son    • Colon cancer Neg Hx    • Colon polyps Neg Hx    • Malig Hyperthermia Neg Hx        Surgical History:   Past Surgical History:   Procedure Laterality Date   • CHOLECYSTECTOMY  2013   • ENDOSCOPY  2018   • ENDOSCOPY N/A 10/29/2021    Procedure:  ESOPHAGOGASTRODUODENOSCOPY WITH BIOPSY;  Surgeon: Stveen Meehan MD;  [place] [facility] [place];  Service: Gastroenterology;  Laterality: N/A;   • ENDOSCOPY AND COLONOSCOPY N/A 2017   • LYSIS OF ABDOMINAL ADHESIONS  07/10/2014    after SBO   • SINUS SURGERY  2007    persistent congestion ?? turbinate reduction   • TONSILLECTOMY      1978         Current Outpatient Medications:   •  cholecalciferol (VITAMIN D3) 1000 units tablet, Take 1 tablet by mouth Daily., Disp: 30 tablet, Rfl: 5  •  omeprazole (priLOSEC) 40 MG capsule, Take 1 capsule by mouth Daily., Disp: 90 capsule, Rfl: 3  •  pitavastatin calcium (Livalo) 2 MG tablet tablet, Take 1 tablet by mouth Every Night., Disp: 90 tablet, Rfl: 2  •  psyllium (METAMUCIL) 58.6 % powder, Take  by mouth Daily., Disp: , Rfl: 12  •  telmisartan (MICARDIS) 80 MG tablet, Take 1 tablet by mouth Daily., Disp: 90 tablet, Rfl: 2  •  metroNIDAZOLE (Metrogel) 1 % gel, Apply  topically to the appropriate area as directed Daily., Disp: 60 g, Rfl: 2    Vitals:    11/29/22 1454   BP: 108/78   Pulse: 95   Temp: 98.2 °F (36.8 °C)   SpO2: 99%     Body mass index is 31.95 kg/m².    Physical Exam  Vitals reviewed.   Constitutional:       General: He is not in acute distress.     Appearance: Normal appearance. He is well-developed. He is not ill-appearing or toxic-appearing.   HENT:      Head: Normocephalic and atraumatic.      Right Ear: Hearing normal.      Left Ear: Hearing normal.      Nose: Nose normal.      Mouth/Throat:      Mouth: Mucous membranes are moist. No oral lesions.      Tongue: No lesions.      Pharynx: Oropharynx is clear. Uvula midline. No pharyngeal swelling, oropharyngeal exudate, posterior oropharyngeal erythema or uvula swelling.   Eyes:      General: Lids are normal. No scleral icterus.        Right eye: No discharge.         Left eye: No discharge.      Extraocular Movements: Extraocular movements intact.      Conjunctiva/sclera: Conjunctivae normal.      Pupils:  Pupils are equal, round, and reactive to light.   Neck:      Thyroid: No thyroid mass or thyromegaly.      Vascular: No carotid bruit.   Cardiovascular:      Rate and Rhythm: Normal rate and regular rhythm.      Pulses:           Radial pulses are 2+ on the right side and 2+ on the left side.        Dorsalis pedis pulses are 2+ on the right side and 2+ on the left side.        Posterior tibial pulses are 2+ on the right side and 2+ on the left side.      Heart sounds: Normal heart sounds, S1 normal and S2 normal. No murmur heard.    No friction rub. No gallop.   Pulmonary:      Effort: Pulmonary effort is normal. No respiratory distress.      Breath sounds: Normal breath sounds. No wheezing, rhonchi or rales.   Abdominal:      General: Bowel sounds are normal. There is no distension.      Palpations: Abdomen is soft. There is no mass.      Tenderness: There is no abdominal tenderness. There is no guarding or rebound.   Genitourinary:     Prostate: Normal. Not enlarged and not tender.      Rectum: Normal. No external hemorrhoid. Normal anal tone.   Musculoskeletal:         General: No deformity. Normal range of motion.      Cervical back: Full passive range of motion without pain, normal range of motion and neck supple.      Right lower leg: No edema.      Left lower leg: No edema.   Lymphadenopathy:      Cervical: No cervical adenopathy.      Right cervical: No superficial, deep or posterior cervical adenopathy.     Left cervical: No superficial, deep or posterior cervical adenopathy.      Upper Body:      Right upper body: No supraclavicular, axillary or pectoral adenopathy.      Left upper body: No supraclavicular, axillary or pectoral adenopathy.   Skin:     General: Skin is warm and dry.      Findings: No rash.   Neurological:      Mental Status: He is alert and oriented to person, place, and time.      Cranial Nerves: No cranial nerve deficit.      Sensory: No sensory deficit.      Motor: No weakness, tremor,  atrophy or abnormal muscle tone.      Gait: Gait normal.      Deep Tendon Reflexes: Reflexes are normal and symmetric.      Reflex Scores:       Patellar reflexes are 2+ on the right side and 2+ on the left side.       Achilles reflexes are 2+ on the right side and 2+ on the left side.  Psychiatric:         Attention and Perception: Attention normal.         Mood and Affect: Mood normal.         Speech: Speech normal.         Behavior: Behavior normal. Behavior is cooperative.         Thought Content: Thought content normal.         Assessment/ Plan  Diagnoses and all orders for this visit:    Encounter for general adult medical examination with abnormal findings    Obesity (BMI 30-39.9)    Metabolic syndrome    IFG (impaired fasting glucose)    Hereditary hemochromatosis (HCC)    Gastroesophageal reflux disease without esophagitis    Fatty liver    Essential hypertension    Dyslipidemia    Denis's esophagus with dysplasia    Diverticulosis of large intestine without hemorrhage    Squamous cell carcinoma of skin    Rosacea    Avitaminosis D        Return in about 6 months (around 5/29/2023) for Recheck.      Discussion:  Matais Hughes is a 52 y.o. male RTC in yearly CPE, review of medical issues:  1. HTN - new dx in 2019, controlled telmisartan 80mg daily.BMP OK.  2. IFG/ Obesity,  Dx metabolic syndrome - exercise trends im proved with addition of volleyball weekly.   FBS mild elevation, but better trend over time.  A1C normalized. Further exercise augmentation advised.   3. Dyslipidemia, low HDL; Metabolic Syndrome and past high average hsCRP - did not tolerate Atorvastatin due muscle aching and myalgias. Tolerating Livalo well, LDL at goal. CV exercise increase for HDL improvement, counseled.   4. Hx of SBO related to adhesions from gallbladder surgery adhesions; 2013; 2015; 2016 with recent inpt stay at [facility] for recurrent SBO 4/13-4/16/21 - s/p eval at  for eval and CT enteroclysis, mild enteroperitoneal  adhesions in R lower and L midabdomen. S/p eval with surgery and no intervention advised.  Resolved recent gastroeneritis (CT with ? Partial SBO, no transition point 11/2022 in ED).   5. Hemachromatosis - phlebotomy ~ 4x/ year in past with goal ferritin < 50 per Dr. Noonan. Appt next week.    6. Reflux esophagitis/ Denis's Esophagus repeat  EGD 11/2018 --> 10/2021 no dysplasia - repeat 3 years EGD. C/W 40 mg Omeprazole daily.   7. Vitamin D deficiency - on 1000 I.U. Vitamin D3 OTC, restart daily use. Trend levels.   8. Squamous Cell CA of skin ~2014 - sees derm q 6 months, UTD 8/2022.   9. Rosacea - off minocycline for years now. MetroGel PRN flares.  10. HM - labs d/w pt; Flu/ Tdap/ Hep A/ Shingrix/ COVID - UTD;  C-scope UTD 2011 --> 10/2017 --> 10 years; NAGI OK, PSA next labs and annually; Hep C Ab (-) 8/2020; add more exercise.     RTC 6 months, F labs prior (CMP, A1C, HDL, VitD, PSA)

## 2022-12-06 ENCOUNTER — INFUSION (OUTPATIENT)
Dept: ONCOLOGY | Facility: HOSPITAL | Age: 52
End: 2022-12-06

## 2022-12-06 ENCOUNTER — OFFICE VISIT (OUTPATIENT)
Dept: ONCOLOGY | Facility: CLINIC | Age: 52
End: 2022-12-06

## 2022-12-06 ENCOUNTER — LAB (OUTPATIENT)
Dept: OTHER | Facility: HOSPITAL | Age: 52
End: 2022-12-06

## 2022-12-06 VITALS
HEART RATE: 80 BPM | BODY MASS INDEX: 32.58 KG/M2 | OXYGEN SATURATION: 97 % | TEMPERATURE: 98 F | SYSTOLIC BLOOD PRESSURE: 116 MMHG | HEIGHT: 71 IN | DIASTOLIC BLOOD PRESSURE: 75 MMHG | WEIGHT: 232.7 LBS | RESPIRATION RATE: 18 BRPM

## 2022-12-06 VITALS — SYSTOLIC BLOOD PRESSURE: 104 MMHG | DIASTOLIC BLOOD PRESSURE: 67 MMHG

## 2022-12-06 DIAGNOSIS — E83.110 HEREDITARY HEMOCHROMATOSIS: Primary | ICD-10-CM

## 2022-12-06 DIAGNOSIS — E83.110 HEREDITARY HEMOCHROMATOSIS: ICD-10-CM

## 2022-12-06 LAB
ALBUMIN SERPL-MCNC: 4.6 G/DL (ref 3.5–5.2)
ALBUMIN/GLOB SERPL: 1.6 G/DL
ALP SERPL-CCNC: 74 U/L (ref 39–117)
ALT SERPL W P-5'-P-CCNC: 42 U/L (ref 1–41)
ANION GAP SERPL CALCULATED.3IONS-SCNC: 10.1 MMOL/L (ref 5–15)
AST SERPL-CCNC: 26 U/L (ref 1–40)
BASOPHILS # BLD AUTO: 0.02 10*3/MM3 (ref 0–0.2)
BASOPHILS NFR BLD AUTO: 0.3 % (ref 0–1.5)
BILIRUB SERPL-MCNC: 0.8 MG/DL (ref 0–1.2)
BUN SERPL-MCNC: 14 MG/DL (ref 6–20)
BUN/CREAT SERPL: 13.5 (ref 7–25)
CALCIUM SPEC-SCNC: 9.6 MG/DL (ref 8.6–10.5)
CHLORIDE SERPL-SCNC: 101 MMOL/L (ref 98–107)
CO2 SERPL-SCNC: 26.9 MMOL/L (ref 22–29)
CREAT SERPL-MCNC: 1.04 MG/DL (ref 0.76–1.27)
DEPRECATED RDW RBC AUTO: 46.2 FL (ref 37–54)
EGFRCR SERPLBLD CKD-EPI 2021: 85.3 ML/MIN/1.73
EOSINOPHIL # BLD AUTO: 0.05 10*3/MM3 (ref 0–0.4)
EOSINOPHIL NFR BLD AUTO: 0.8 % (ref 0.3–6.2)
ERYTHROCYTE [DISTWIDTH] IN BLOOD BY AUTOMATED COUNT: 14.4 % (ref 12.3–15.4)
FERRITIN SERPL-MCNC: 61.2 NG/ML (ref 30–400)
GLOBULIN UR ELPH-MCNC: 2.9 GM/DL
GLUCOSE SERPL-MCNC: 142 MG/DL (ref 65–99)
HCT VFR BLD AUTO: 42.1 % (ref 37.5–51)
HGB BLD-MCNC: 14.1 G/DL (ref 13–17.7)
IMM GRANULOCYTES # BLD AUTO: 0.01 10*3/MM3 (ref 0–0.05)
IMM GRANULOCYTES NFR BLD AUTO: 0.2 % (ref 0–0.5)
LYMPHOCYTES # BLD AUTO: 2.1 10*3/MM3 (ref 0.7–3.1)
LYMPHOCYTES NFR BLD AUTO: 34.2 % (ref 19.6–45.3)
MCH RBC QN AUTO: 29.6 PG (ref 26.6–33)
MCHC RBC AUTO-ENTMCNC: 33.5 G/DL (ref 31.5–35.7)
MCV RBC AUTO: 88.4 FL (ref 79–97)
MONOCYTES # BLD AUTO: 0.41 10*3/MM3 (ref 0.1–0.9)
MONOCYTES NFR BLD AUTO: 6.7 % (ref 5–12)
NEUTROPHILS NFR BLD AUTO: 3.55 10*3/MM3 (ref 1.7–7)
NEUTROPHILS NFR BLD AUTO: 57.8 % (ref 42.7–76)
NRBC BLD AUTO-RTO: 0 /100 WBC (ref 0–0.2)
PLATELET # BLD AUTO: 199 10*3/MM3 (ref 140–450)
PMV BLD AUTO: 10 FL (ref 6–12)
POTASSIUM SERPL-SCNC: 4.3 MMOL/L (ref 3.5–5.2)
PROT SERPL-MCNC: 7.5 G/DL (ref 6–8.5)
RBC # BLD AUTO: 4.76 10*6/MM3 (ref 4.14–5.8)
SODIUM SERPL-SCNC: 138 MMOL/L (ref 136–145)
WBC NRBC COR # BLD: 6.14 10*3/MM3 (ref 3.4–10.8)

## 2022-12-06 PROCEDURE — 80053 COMPREHEN METABOLIC PANEL: CPT | Performed by: INTERNAL MEDICINE

## 2022-12-06 PROCEDURE — 82728 ASSAY OF FERRITIN: CPT | Performed by: INTERNAL MEDICINE

## 2022-12-06 PROCEDURE — 99195 PHLEBOTOMY: CPT

## 2022-12-06 PROCEDURE — 99213 OFFICE O/P EST LOW 20 MIN: CPT | Performed by: INTERNAL MEDICINE

## 2022-12-06 PROCEDURE — 36415 COLL VENOUS BLD VENIPUNCTURE: CPT

## 2022-12-06 PROCEDURE — 85025 COMPLETE CBC W/AUTO DIFF WBC: CPT | Performed by: INTERNAL MEDICINE

## 2022-12-06 RX ORDER — SODIUM CHLORIDE 9 MG/ML
250 INJECTION, SOLUTION INTRAVENOUS ONCE
Status: COMPLETED | OUTPATIENT
Start: 2022-12-06 | End: 2022-12-06

## 2022-12-06 RX ORDER — SODIUM CHLORIDE 9 MG/ML
250 INJECTION, SOLUTION INTRAVENOUS ONCE
OUTPATIENT
Start: 2022-12-13

## 2022-12-06 RX ADMIN — SODIUM CHLORIDE 250 ML: 9 INJECTION, SOLUTION INTRAVENOUS at 15:30

## 2022-12-06 NOTE — PROGRESS NOTES
Subjective   REASON FOR FOLLOWUP: Hereditary hemochromatosis, undergoing phlebotomy with the goal of getting his ferritin around 50.     HISTORY OF PRESENT ILLNESS:   Mr. Hughes is a pleasant 52 y.o. gentleman with hereditary hemochromatosis, undergoing therapeutic phlebotomy to try to keep his ferritin below 100 and closer to 50, if possible. He has been having his ferritin drawn every 3 months.    He looks great in the office today.  His ferritin level from today was 61 therefore we will proceed with phlebotomy in the office today.    At this point I think we can stretch his phlebotomies out to her every 4 months over the next year.    He reports he is otherwise feeling well. He had a viral gastroenteritis for a few days but feels that he is completely over it at this point.    History of Present Illness      Past Medical History, Past Surgical History, Social History, Family History have been reviewed and are without significant changes except as mentioned.    Review of Systems   Constitutional: Negative for activity change, appetite change, fatigue, fever and unexpected weight change.   HENT: Negative for hearing loss, nosebleeds, trouble swallowing and voice change.    Eyes: Negative for visual disturbance.   Respiratory: Negative for cough, shortness of breath and wheezing.    Cardiovascular: Negative for chest pain and palpitations.   Gastrointestinal: Negative for abdominal pain, diarrhea, nausea and vomiting.   Genitourinary: Negative for difficulty urinating, frequency, hematuria and urgency.   Musculoskeletal: Negative for back pain and neck pain.   Skin: Negative for rash.   Neurological: Negative for dizziness, seizures, syncope and headaches.   Hematological: Negative for adenopathy. Does not bruise/bleed easily.   Psychiatric/Behavioral: Negative for behavioral problems. The patient is not nervous/anxious.       A comprehensive 14 point review of systems was performed and was negative except as  "mentioned.    Medications:  The current medication list was reviewed in the EMR    ALLERGIES:    Allergies   Allergen Reactions   • Cefdinir Hives       Objective      Vitals:    12/06/22 1436   BP: 116/75   Pulse: 80   Resp: 18   Temp: 98 °F (36.7 °C)   TempSrc: Temporal   SpO2: 97%   Weight: 106 kg (232 lb 11.2 oz)   Height: 180.3 cm (70.98\")   PainSc: 0-No pain     Current Status 12/6/2022   ECOG score 0       Physical Exam   Constitutional: He is oriented to person, place, and time. He appears well-developed. No distress.   HENT:   Head: Normocephalic.   Eyes: Pupils are equal, round, and reactive to light. Conjunctivae are normal. No scleral icterus.   Neck: No JVD present. No thyromegaly present.   Cardiovascular: Normal rate and regular rhythm. Exam reveals no gallop and no friction rub.   No murmur heard.  Pulmonary/Chest: Effort normal and breath sounds normal. He has no wheezes. He has no rales.   Abdominal: Soft. He exhibits no distension and no mass. There is no abdominal tenderness.   Musculoskeletal: Normal range of motion. No deformity.   Lymphadenopathy:     He has no cervical adenopathy.   Neurological: He is alert and oriented to person, place, and time. He has normal reflexes. No cranial nerve deficit.   Skin: Skin is warm and dry. No rash noted. No erythema.   Psychiatric: His behavior is normal. Judgment normal.         RECENT LABS:  Hematology WBC   Date Value Ref Range Status   12/06/2022 6.14 3.40 - 10.80 10*3/mm3 Final   11/22/2022 5.51 3.40 - 10.80 10*3/mm3 Final   04/16/2021 4.48 (L) 4.5 - 11.0 10*3/uL Final     RBC   Date Value Ref Range Status   12/06/2022 4.76 4.14 - 5.80 10*6/mm3 Final   11/22/2022 4.62 4.14 - 5.80 10*6/mm3 Final   04/16/2021 4.33 (L) 4.5 - 5.9 10*6/uL Final     Hemoglobin   Date Value Ref Range Status   12/06/2022 14.1 13.0 - 17.7 g/dL Final   04/16/2021 12.8 (L) 13.5 - 17.5 g/dL Final     Hematocrit   Date Value Ref Range Status   12/06/2022 42.1 37.5 - 51.0 % Final "   04/16/2021 37.1 (L) 41.0 - 53.0 % Final     Platelets   Date Value Ref Range Status   12/06/2022 199 140 - 450 10*3/mm3 Final   04/16/2021 148 140 - 440 10*3/uL Final            Lab Results   Component Value Date    FERRITIN 61.20 12/06/2022       Assessment & Plan     ASSESSMENT:   1. Hemochromatosis, undergoing therapeutic phlebotomy with the goal of keeping his ferritin around 50.   2. Intermittent mild thrombocytopenia. Platelets today were normal at 199,000.          PLAN:   1. Mr. Hughes will undergo phlebotomy 500 cc in the office today along with 250 cc saline hydration.   2. He will be returning every 4 months for lab with R.N. review and phlebotomy p.r.n. if his ferritin is greater than 50.  3. He will see a physician again in the office in 12 months.                 12/6/2022      CC:

## 2023-03-28 ENCOUNTER — LAB (OUTPATIENT)
Dept: OTHER | Facility: HOSPITAL | Age: 53
End: 2023-03-28
Payer: COMMERCIAL

## 2023-03-28 ENCOUNTER — INFUSION (OUTPATIENT)
Dept: ONCOLOGY | Facility: HOSPITAL | Age: 53
End: 2023-03-28
Payer: COMMERCIAL

## 2023-03-28 VITALS
DIASTOLIC BLOOD PRESSURE: 87 MMHG | RESPIRATION RATE: 18 BRPM | HEIGHT: 71 IN | HEART RATE: 83 BPM | BODY MASS INDEX: 33.04 KG/M2 | OXYGEN SATURATION: 99 % | TEMPERATURE: 97.2 F | WEIGHT: 236 LBS | SYSTOLIC BLOOD PRESSURE: 126 MMHG

## 2023-03-28 DIAGNOSIS — E83.110 HEREDITARY HEMOCHROMATOSIS: ICD-10-CM

## 2023-03-28 LAB
BASOPHILS # BLD AUTO: 0.02 10*3/MM3 (ref 0–0.2)
BASOPHILS NFR BLD AUTO: 0.4 % (ref 0–1.5)
DEPRECATED RDW RBC AUTO: 45.5 FL (ref 37–54)
EOSINOPHIL # BLD AUTO: 0.05 10*3/MM3 (ref 0–0.4)
EOSINOPHIL NFR BLD AUTO: 1 % (ref 0.3–6.2)
ERYTHROCYTE [DISTWIDTH] IN BLOOD BY AUTOMATED COUNT: 14.4 % (ref 12.3–15.4)
FERRITIN SERPL-MCNC: 34.4 NG/ML (ref 30–400)
HCT VFR BLD AUTO: 40.5 % (ref 37.5–51)
HGB BLD-MCNC: 13.4 G/DL (ref 13–17.7)
IMM GRANULOCYTES # BLD AUTO: 0.01 10*3/MM3 (ref 0–0.05)
IMM GRANULOCYTES NFR BLD AUTO: 0.2 % (ref 0–0.5)
LYMPHOCYTES # BLD AUTO: 1.67 10*3/MM3 (ref 0.7–3.1)
LYMPHOCYTES NFR BLD AUTO: 32.4 % (ref 19.6–45.3)
MCH RBC QN AUTO: 28.4 PG (ref 26.6–33)
MCHC RBC AUTO-ENTMCNC: 33.1 G/DL (ref 31.5–35.7)
MCV RBC AUTO: 85.8 FL (ref 79–97)
MONOCYTES # BLD AUTO: 0.29 10*3/MM3 (ref 0.1–0.9)
MONOCYTES NFR BLD AUTO: 5.6 % (ref 5–12)
NEUTROPHILS NFR BLD AUTO: 3.11 10*3/MM3 (ref 1.7–7)
NEUTROPHILS NFR BLD AUTO: 60.4 % (ref 42.7–76)
NRBC BLD AUTO-RTO: 0 /100 WBC (ref 0–0.2)
PLATELET # BLD AUTO: 178 10*3/MM3 (ref 140–450)
PMV BLD AUTO: 9.8 FL (ref 6–12)
RBC # BLD AUTO: 4.72 10*6/MM3 (ref 4.14–5.8)
WBC NRBC COR # BLD: 5.15 10*3/MM3 (ref 3.4–10.8)

## 2023-03-28 PROCEDURE — 85025 COMPLETE CBC W/AUTO DIFF WBC: CPT | Performed by: INTERNAL MEDICINE

## 2023-03-28 PROCEDURE — 82728 ASSAY OF FERRITIN: CPT | Performed by: INTERNAL MEDICINE

## 2023-03-28 PROCEDURE — 36415 COLL VENOUS BLD VENIPUNCTURE: CPT

## 2023-03-28 PROCEDURE — G0463 HOSPITAL OUTPT CLINIC VISIT: HCPCS

## 2023-03-28 RX ORDER — SODIUM CHLORIDE 9 MG/ML
250 INJECTION, SOLUTION INTRAVENOUS ONCE
OUTPATIENT
Start: 2023-04-04

## 2023-03-28 NOTE — NURSING NOTE
Ferritin level 34.4 no phlebo indicated per treatment plan parameters. Copy of lab given to pt. No complaints voiced, F/U appts reviewed and instructed to call the office for any concerns prior to next appt. Pt vu and discharged in stable condition.

## 2023-05-25 DIAGNOSIS — E78.5 DYSLIPIDEMIA: Primary | ICD-10-CM

## 2023-05-25 DIAGNOSIS — R73.01 IFG (IMPAIRED FASTING GLUCOSE): ICD-10-CM

## 2023-05-25 DIAGNOSIS — I10 ESSENTIAL HYPERTENSION: ICD-10-CM

## 2023-05-25 DIAGNOSIS — Z12.5 SCREENING FOR PROSTATE CANCER: ICD-10-CM

## 2023-05-25 DIAGNOSIS — E55.9 AVITAMINOSIS D: ICD-10-CM

## 2023-05-25 LAB
25(OH)D3+25(OH)D2 SERPL-MCNC: 22.9 NG/ML (ref 30–100)
ALBUMIN SERPL-MCNC: 4.7 G/DL (ref 3.5–5.2)
ALBUMIN/GLOB SERPL: 1.9 G/DL
ALP SERPL-CCNC: 61 U/L (ref 39–117)
ALT SERPL-CCNC: 41 U/L (ref 1–41)
AST SERPL-CCNC: 24 U/L (ref 1–40)
BILIRUB SERPL-MCNC: 0.8 MG/DL (ref 0–1.2)
BUN SERPL-MCNC: 14 MG/DL (ref 6–20)
BUN/CREAT SERPL: 12 (ref 7–25)
CALCIUM SERPL-MCNC: 10.1 MG/DL (ref 8.6–10.5)
CHLORIDE SERPL-SCNC: 104 MMOL/L (ref 98–107)
CO2 SERPL-SCNC: 27.1 MMOL/L (ref 22–29)
CREAT SERPL-MCNC: 1.17 MG/DL (ref 0.76–1.27)
EGFRCR SERPLBLD CKD-EPI 2021: 74.1 ML/MIN/1.73
GLOBULIN SER CALC-MCNC: 2.5 GM/DL
GLUCOSE SERPL-MCNC: 127 MG/DL (ref 65–99)
HBA1C MFR BLD: 5.3 % (ref 4.8–5.6)
HDLC SERPL-MCNC: 36 MG/DL (ref 40–60)
POTASSIUM SERPL-SCNC: 4.6 MMOL/L (ref 3.5–5.2)
PROT SERPL-MCNC: 7.2 G/DL (ref 6–8.5)
PSA SERPL-MCNC: 0.43 NG/ML (ref 0–4)
SODIUM SERPL-SCNC: 141 MMOL/L (ref 136–145)

## 2023-06-01 ENCOUNTER — OFFICE VISIT (OUTPATIENT)
Dept: INTERNAL MEDICINE | Facility: CLINIC | Age: 53
End: 2023-06-01

## 2023-06-01 VITALS
HEART RATE: 96 BPM | OXYGEN SATURATION: 99 % | HEIGHT: 71 IN | DIASTOLIC BLOOD PRESSURE: 82 MMHG | WEIGHT: 226.6 LBS | BODY MASS INDEX: 31.72 KG/M2 | SYSTOLIC BLOOD PRESSURE: 116 MMHG | TEMPERATURE: 98.2 F

## 2023-06-01 DIAGNOSIS — E66.9 OBESITY (BMI 30-39.9): ICD-10-CM

## 2023-06-01 DIAGNOSIS — I10 ESSENTIAL HYPERTENSION: Primary | ICD-10-CM

## 2023-06-01 DIAGNOSIS — E83.110 HEREDITARY HEMOCHROMATOSIS: ICD-10-CM

## 2023-06-01 DIAGNOSIS — R73.01 IFG (IMPAIRED FASTING GLUCOSE): ICD-10-CM

## 2023-06-01 DIAGNOSIS — E78.5 DYSLIPIDEMIA: ICD-10-CM

## 2023-06-01 DIAGNOSIS — E88.81 METABOLIC SYNDROME: ICD-10-CM

## 2023-06-01 PROCEDURE — 99214 OFFICE O/P EST MOD 30 MIN: CPT | Performed by: INTERNAL MEDICINE

## 2023-06-01 NOTE — PROGRESS NOTES
Hypertension (6 month f/u)      HPI  Matias Hughes is a 52 y.o. male RTC in f/u:  Has been doing well.  Health has been good.   Tweaked knee around easter and rested/ did PT and largely resolved. Back on golf course with knee.   1. HTN, new dx in 2019 - controlled telmisartan 80mg daily. Rare checks at home, similar to today's.   2. IFG/ Obesity,  Dx metabolic syndrome - weight down a bit, lost with GI bug about 6 months ago and then trying 'to keep it off'.  Diet is better, 'easing off on red meat. Few more vegetables'.  Off exercise more, now back to walking and golf with knee improved.   3. Dyslipidemia, low HDL; Metabolic Syndrome and past high average hsCRP - did not tolerate Atorvastatin due muscle aching and myalgias, doing well on Livalo daily.   No issues.  Has been off exercise due to knee issues, now better.   4. Hemachromatosis - phlebotomy ~ 4x/ year in past with goal ferritin < 50 per Dr. Noonan. Last time saw him, did not need phlebotomy, 'I was under 50'.    5. Vitamin D deficiency - off 1000 I.U. Vitamin D3 OTC 'a while'.      Review of Systems   Constitutional: Positive for weight loss (intentionally kept weight off after some initial loss ~6 months ago. ). Negative for chills and fever.   Cardiovascular: Negative for chest pain, dyspnea on exertion, irregular heartbeat, near-syncope, palpitations and syncope.   Respiratory: Negative for shortness of breath.    Skin: Positive for suspicious lesions ('knot' noted on L posterior neck, not sure what is. No TTP. no skin changes overlying. ). Negative for poor wound healing (has bug bite on back of L leg, present a few weeks, scabbed over. No TTP).   Neurological: Negative for dizziness and light-headedness.       The following portions of the patient's history were reviewed and updated as appropriate: allergies, current medications, past medical history, past social history and problem list.      Current Outpatient Medications:   •  cholecalciferol  "(VITAMIN D3) 1000 units tablet, Take 1 tablet by mouth Daily., Disp: 30 tablet, Rfl: 5  •  metroNIDAZOLE (Metrogel) 1 % gel, Apply  topically to the appropriate area as directed Daily., Disp: 60 g, Rfl: 2  •  omeprazole (priLOSEC) 40 MG capsule, Take 1 capsule by mouth Daily., Disp: 90 capsule, Rfl: 3  •  pitavastatin calcium (Livalo) 2 MG tablet tablet, Take 1 tablet by mouth Every Night., Disp: 90 tablet, Rfl: 2  •  psyllium (METAMUCIL) 58.6 % powder, Take  by mouth Daily., Disp: , Rfl: 12  •  telmisartan (MICARDIS) 80 MG tablet, Take 1 tablet by mouth Daily., Disp: 90 tablet, Rfl: 2    Vitals:    06/01/23 1550   BP: 116/82   BP Location: Left arm   Patient Position: Sitting   Cuff Size: Adult   Pulse: 96   Temp: 98.2 °F (36.8 °C)   TempSrc: Oral   SpO2: 99%   Weight: 103 kg (226 lb 9.6 oz)   Height: 180.3 cm (71\")     Body mass index is 31.6 kg/m².      Physical Exam  Vitals reviewed.   Constitutional:       General: He is not in acute distress.     Appearance: He is well-developed. He is obese. He is not ill-appearing or toxic-appearing.   HENT:      Head: Normocephalic and atraumatic.      Mouth/Throat:      Mouth: No oral lesions.      Tongue: No lesions.      Pharynx: No pharyngeal swelling or uvula swelling.   Eyes:      General: No scleral icterus.     Conjunctiva/sclera: Conjunctivae normal.      Pupils: Pupils are equal, round, and reactive to light.   Neck:      Vascular: No carotid bruit.   Cardiovascular:      Rate and Rhythm: Normal rate and regular rhythm.      Pulses:           Carotid pulses are 2+ on the right side and 2+ on the left side.       Radial pulses are 2+ on the right side and 2+ on the left side.      Heart sounds: Normal heart sounds.   Pulmonary:      Effort: Pulmonary effort is normal. No respiratory distress.      Breath sounds: Normal breath sounds. No wheezing, rhonchi or rales.   Musculoskeletal:      Cervical back: Normal range of motion and neck supple. No edema or erythema. " No pain with movement or muscular tenderness.      Right lower leg: No edema.      Left lower leg: No edema.   Lymphadenopathy:      Cervical: No cervical adenopathy.   Skin:         Neurological:      Mental Status: He is alert and oriented to person, place, and time.      Cranial Nerves: No cranial nerve deficit.      Gait: Gait normal.   Psychiatric:         Attention and Perception: Attention normal.         Mood and Affect: Mood and affect normal.         Behavior: Behavior normal.         Thought Content: Thought content normal.         Assessment/ Plan  Diagnoses and all orders for this visit:    Essential hypertension    IFG (impaired fasting glucose)    Obesity (BMI 30-39.9)    Metabolic syndrome    Dyslipidemia    Hereditary hemochromatosis        Return in about 6 months (around 12/1/2023) for Annual physical.      Discussion:  Matias Hughes is a 52 y.o. male RTC in f/u:    1. HTN, new dx in 2019 - controlled telmisartan 80mg daily. BMP OK.  2. IFG/ Obesity,  Dx metabolic syndrome - weight loss modest.  A1C remains normal with discordant elevated FBS.   TLC diet mods and aggressive exercise addition advised for goal of weight loss. Trend.   3. Dyslipidemia, low HDL; Metabolic Syndrome and past high average hsCRP - did not tolerate Atorvastatin due muscle aching and myalgias. Tolerating Livalo well, LDL at goal last labs. HDL remains low, augment CV exercise.   4. Hemachromatosis - phlebotomy ~ 4x/ year in past with goal ferritin < 50 per Dr. Noonan - appt upcoming.    5. Vitamin D deficiency - progressive off 1000 I.U. Vitamin D3 OTC. Restart daily Vit D.   6. HM - PSA OK on labs, will check annually    RTC 6 months CPE, F labs prior (Include PSA)

## 2023-06-06 DIAGNOSIS — I10 ESSENTIAL HYPERTENSION: ICD-10-CM

## 2023-06-06 RX ORDER — TELMISARTAN 80 MG/1
TABLET ORAL
Qty: 90 TABLET | Refills: 2 | Status: SHIPPED | OUTPATIENT
Start: 2023-06-06

## 2023-06-17 DIAGNOSIS — K22.719 BARRETT'S ESOPHAGUS WITH DYSPLASIA: ICD-10-CM

## 2023-06-18 RX ORDER — OMEPRAZOLE 40 MG/1
CAPSULE, DELAYED RELEASE ORAL
Qty: 90 CAPSULE | Refills: 3 | Status: SHIPPED | OUTPATIENT
Start: 2023-06-18

## 2023-06-18 RX ORDER — PITAVASTATIN CALCIUM 2.09 MG/1
TABLET, FILM COATED ORAL
Qty: 90 TABLET | Refills: 2 | Status: SHIPPED | OUTPATIENT
Start: 2023-06-18

## 2023-11-07 ENCOUNTER — LAB (OUTPATIENT)
Dept: OTHER | Facility: HOSPITAL | Age: 53
End: 2023-11-07
Payer: COMMERCIAL

## 2023-11-07 ENCOUNTER — INFUSION (OUTPATIENT)
Dept: ONCOLOGY | Facility: HOSPITAL | Age: 53
End: 2023-11-07
Payer: COMMERCIAL

## 2023-11-07 ENCOUNTER — OFFICE VISIT (OUTPATIENT)
Dept: ONCOLOGY | Facility: CLINIC | Age: 53
End: 2023-11-07
Payer: COMMERCIAL

## 2023-11-07 VITALS
BODY MASS INDEX: 32.41 KG/M2 | SYSTOLIC BLOOD PRESSURE: 112 MMHG | RESPIRATION RATE: 16 BRPM | DIASTOLIC BLOOD PRESSURE: 78 MMHG | OXYGEN SATURATION: 98 % | WEIGHT: 231.5 LBS | TEMPERATURE: 97.8 F | HEIGHT: 71 IN | HEART RATE: 82 BPM

## 2023-11-07 VITALS — DIASTOLIC BLOOD PRESSURE: 76 MMHG | HEART RATE: 87 BPM | SYSTOLIC BLOOD PRESSURE: 113 MMHG

## 2023-11-07 DIAGNOSIS — E83.110 HEREDITARY HEMOCHROMATOSIS: ICD-10-CM

## 2023-11-07 DIAGNOSIS — E83.110 HEREDITARY HEMOCHROMATOSIS: Primary | ICD-10-CM

## 2023-11-07 LAB
BASOPHILS # BLD AUTO: 0.02 10*3/MM3 (ref 0–0.2)
BASOPHILS NFR BLD AUTO: 0.3 % (ref 0–1.5)
DEPRECATED RDW RBC AUTO: 45.9 FL (ref 37–54)
EOSINOPHIL # BLD AUTO: 0.18 10*3/MM3 (ref 0–0.4)
EOSINOPHIL NFR BLD AUTO: 2.9 % (ref 0.3–6.2)
ERYTHROCYTE [DISTWIDTH] IN BLOOD BY AUTOMATED COUNT: 14 % (ref 12.3–15.4)
FERRITIN SERPL-MCNC: 111.6 NG/ML (ref 30–400)
HCT VFR BLD AUTO: 39.2 % (ref 37.5–51)
HGB BLD-MCNC: 13.3 G/DL (ref 13–17.7)
IMM GRANULOCYTES # BLD AUTO: 0.01 10*3/MM3 (ref 0–0.05)
IMM GRANULOCYTES NFR BLD AUTO: 0.2 % (ref 0–0.5)
LYMPHOCYTES # BLD AUTO: 1.77 10*3/MM3 (ref 0.7–3.1)
LYMPHOCYTES NFR BLD AUTO: 28.1 % (ref 19.6–45.3)
MCH RBC QN AUTO: 30.4 PG (ref 26.6–33)
MCHC RBC AUTO-ENTMCNC: 33.9 G/DL (ref 31.5–35.7)
MCV RBC AUTO: 89.7 FL (ref 79–97)
MONOCYTES # BLD AUTO: 0.43 10*3/MM3 (ref 0.1–0.9)
MONOCYTES NFR BLD AUTO: 6.8 % (ref 5–12)
NEUTROPHILS NFR BLD AUTO: 3.88 10*3/MM3 (ref 1.7–7)
NEUTROPHILS NFR BLD AUTO: 61.7 % (ref 42.7–76)
NRBC BLD AUTO-RTO: 0 /100 WBC (ref 0–0.2)
PLATELET # BLD AUTO: 170 10*3/MM3 (ref 140–450)
PMV BLD AUTO: 9.4 FL (ref 6–12)
RBC # BLD AUTO: 4.37 10*6/MM3 (ref 4.14–5.8)
WBC NRBC COR # BLD: 6.29 10*3/MM3 (ref 3.4–10.8)

## 2023-11-07 PROCEDURE — 82728 ASSAY OF FERRITIN: CPT | Performed by: INTERNAL MEDICINE

## 2023-11-07 PROCEDURE — 36415 COLL VENOUS BLD VENIPUNCTURE: CPT

## 2023-11-07 PROCEDURE — 85025 COMPLETE CBC W/AUTO DIFF WBC: CPT | Performed by: INTERNAL MEDICINE

## 2023-11-07 PROCEDURE — 99195 PHLEBOTOMY: CPT

## 2023-11-07 RX ORDER — SODIUM CHLORIDE 9 MG/ML
250 INJECTION, SOLUTION INTRAVENOUS ONCE
OUTPATIENT
Start: 2023-11-14

## 2023-11-07 NOTE — PROGRESS NOTES
Subjective   REASON FOR FOLLOWUP: Hereditary hemochromatosis, undergoing phlebotomy with the goal of keeping his ferritin around 50.     HISTORY OF PRESENT ILLNESS:   Mr. Hughes is a pleasant 53 y.o. gentleman with hereditary hemochromatosis, undergoing therapeutic phlebotomy to try to keep his ferritin below 100 and closer to 50, if possible. He has been having his ferritin drawn every 3 months.    His ferritin level from today was 111 therefore we will proceed with phlebotomy in the office today.    He has been having labs checked every 4 months with phlebotomies as needed to maintain his ferritin at 50 or less.  The last previous phlebotomy was December 2022.    He does report he is currently battling a cold but he reports he is otherwise feeling well.     History of Present Illness      Past Medical History, Past Surgical History, Social History, Family History have been reviewed and are without significant changes except as mentioned.    Review of Systems   Constitutional:  Negative for activity change, appetite change, fatigue, fever and unexpected weight change.   HENT:  Negative for hearing loss, nosebleeds, trouble swallowing and voice change.    Eyes:  Negative for visual disturbance.   Respiratory:  Negative for cough, shortness of breath and wheezing.    Cardiovascular:  Negative for chest pain and palpitations.   Gastrointestinal:  Negative for abdominal pain, diarrhea, nausea and vomiting.   Genitourinary:  Negative for difficulty urinating, frequency, hematuria and urgency.   Musculoskeletal:  Negative for back pain and neck pain.   Skin:  Negative for rash.   Neurological:  Negative for dizziness, seizures, syncope and headaches.   Hematological:  Negative for adenopathy. Does not bruise/bleed easily.   Psychiatric/Behavioral:  Negative for behavioral problems. The patient is not nervous/anxious.       A comprehensive 14 point review of systems was performed and was negative except as  "mentioned.    Medications:  The current medication list was reviewed in the EMR    ALLERGIES:    Allergies   Allergen Reactions    Cefdinir Hives       Objective      Vitals:    11/07/23 1543   BP: 112/78   Pulse: 82   Resp: 16   Temp: 97.8 °F (36.6 °C)   TempSrc: Temporal   SpO2: 98%   Weight: 105 kg (231 lb 8 oz)   Height: 180 cm (70.87\")   PainSc: 0-No pain         11/7/2023     3:44 PM   Current Status   ECOG score 0       Physical Exam   Constitutional: He is oriented to person, place, and time. He appears well-developed. No distress.   HENT:   Head: Normocephalic.   Eyes: Pupils are equal, round, and reactive to light. Conjunctivae are normal. No scleral icterus.   Neck: No JVD present. No thyromegaly present.   Cardiovascular: Normal rate and regular rhythm. Exam reveals no gallop and no friction rub.   No murmur heard.  Pulmonary/Chest: Effort normal and breath sounds normal. He has no wheezes. He has no rales.   Abdominal: Soft. He exhibits no distension and no mass. There is no abdominal tenderness.   Musculoskeletal: Normal range of motion. No deformity.   Lymphadenopathy:     He has no cervical adenopathy.   Neurological: He is alert and oriented to person, place, and time. He has normal reflexes. No cranial nerve deficit.   Skin: Skin is warm and dry. No rash noted. No erythema.   Psychiatric: His behavior is normal. Judgment normal.         RECENT LABS:  Hematology WBC   Date Value Ref Range Status   11/07/2023 6.29 3.40 - 10.80 10*3/mm3 Final   11/22/2022 5.51 3.40 - 10.80 10*3/mm3 Final   11/13/2022 7.33 4.5 - 11.0 10*3/uL Final     RBC   Date Value Ref Range Status   11/07/2023 4.37 4.14 - 5.80 10*6/mm3 Final   11/22/2022 4.62 4.14 - 5.80 10*6/mm3 Final   11/13/2022 5.27 4.5 - 5.9 10*6/uL Final     Hemoglobin   Date Value Ref Range Status   11/07/2023 13.3 13.0 - 17.7 g/dL Final   11/13/2022 15.7 13.5 - 17.5 g/dL Final     Hematocrit   Date Value Ref Range Status   11/07/2023 39.2 37.5 - 51.0 % " Final   11/13/2022 45.8 41.0 - 53.0 % Final     Platelets   Date Value Ref Range Status   11/07/2023 170 140 - 450 10*3/mm3 Final   11/13/2022 201 140 - 440 10*3/uL Final            Lab Results   Component Value Date    FERRITIN 111.60 11/07/2023       Assessment & Plan     ASSESSMENT:   Hemochromatosis, undergoing therapeutic phlebotomy with the goal of keeping his ferritin around 50.   Intermittent mild thrombocytopenia. Platelets today were normal at 170,000.          PLAN:   Mr. Hughes will undergo phlebotomy 500 cc in the office today along with 250 cc saline hydration.   He will be returning every 4 months for lab with R.N. review and phlebotomy p.r.n. if his ferritin is greater than 50.  He will see a physician again in the office in 12 months.                 11/7/2023      CC:

## 2023-11-08 ENCOUNTER — OFFICE VISIT (OUTPATIENT)
Dept: INTERNAL MEDICINE | Facility: CLINIC | Age: 53
End: 2023-11-08
Payer: COMMERCIAL

## 2023-11-08 VITALS
HEIGHT: 71 IN | TEMPERATURE: 98.4 F | OXYGEN SATURATION: 98 % | HEART RATE: 95 BPM | BODY MASS INDEX: 32.2 KG/M2 | WEIGHT: 230 LBS | SYSTOLIC BLOOD PRESSURE: 104 MMHG | DIASTOLIC BLOOD PRESSURE: 73 MMHG

## 2023-11-08 DIAGNOSIS — J40 BRONCHITIS: Primary | ICD-10-CM

## 2023-11-08 PROCEDURE — 99213 OFFICE O/P EST LOW 20 MIN: CPT | Performed by: STUDENT IN AN ORGANIZED HEALTH CARE EDUCATION/TRAINING PROGRAM

## 2023-11-08 RX ORDER — FLUTICASONE PROPIONATE 50 MCG
2 SPRAY, SUSPENSION (ML) NASAL DAILY
Qty: 9.9 ML | Refills: 0 | Status: SHIPPED | OUTPATIENT
Start: 2023-11-08

## 2023-11-08 RX ORDER — GUAIFENESIN AND CODEINE PHOSPHATE 100; 10 MG/5ML; MG/5ML
5 SOLUTION ORAL 3 TIMES DAILY PRN
Qty: 118 ML | Refills: 0 | Status: SHIPPED | OUTPATIENT
Start: 2023-11-08

## 2023-11-08 RX ORDER — BENZONATATE 100 MG/1
100 CAPSULE ORAL 3 TIMES DAILY PRN
Qty: 30 CAPSULE | Refills: 2 | Status: SHIPPED | OUTPATIENT
Start: 2023-11-08

## 2023-11-08 NOTE — PROGRESS NOTES
Hi Carnes M.D.  Internal Medicine  [facility]  [address], [address]  [place] [address]  [phone]      Chief Complaint  Cough (Cough x 3 weeks now worse and worse at night /) and Nasal Congestion    SUBJECTIVE    History of Present Illness    Matias Hughes is a 53 y.o. male with hemochromatosis, GERD, dyslipidemia, fatty liver, Denis's esophagus, diverticulosis, hypertension who presents to the office today as an established patient of Dr Bernard Souza MD here today for an acute care visit.     Cold for 3 weeks. It initially got better but now is worse. Cough and congestion for 4 days. Coughing up yellow sputum. No shortness of breath. Sleepping poorly due to cough. Taking Robitussin for cough. Took a COVID test a few weeks ago and waws negative. No fevers, chills.     He went to his hematologist yesterday for phlebotomy    Review of Systems    Allergies   Allergen Reactions    Cefdinir Hives        Outpatient Medications Marked as Taking for the 11/8/23 encounter (Office Visit) with Hi Carnes MD   Medication Sig Dispense Refill    cholecalciferol (VITAMIN D3) 1000 units tablet Take 1 tablet by mouth Daily. 30 tablet 5    Livalo 2 MG tablet tablet TAKE ONE TABLET BY MOUTH ONCE NIGHTLY 90 tablet 2    metroNIDAZOLE (Metrogel) 1 % gel Apply  topically to the appropriate area as directed Daily. 60 g 2    omeprazole (priLOSEC) 40 MG capsule TAKE ONE CAPSULE BY MOUTH DAILY 90 capsule 3    psyllium (METAMUCIL) 58.6 % powder Take  by mouth Daily.  12    telmisartan (MICARDIS) 80 MG tablet TAKE ONE TABLET BY MOUTH DAILY 90 tablet 2        Past Medical History:   Diagnosis Date    Acute prostatitis 10/11/2018    10/2018; on background of chronic prostatitis     Diverticulosis     Diverticulosis of large intestine without hemorrhage 11/2/2018    GERD (gastroesophageal reflux disease)     H/O hypogonadism     Hemochromatosis     heterozygous for C282Y mutation hx elevated LFT's  "Ferritin elevated at dx    History of cholelithiasis     History of chronic prostatitis     2010 tx'd by Dr Cooper    History of Clostridium difficile colitis     Hospitalized June 2013; with SBO    History of small bowel obstruction     related to adhesions from gallbladder surgery adhesions; 2013; 2015; 2016    Overweight     Prehypertension     Rosacea 6/2/2016    Squamous cell carcinoma of skin     2012 sees derm q 6 months (Dr. Mitchell)    Vitamin D deficiency      Past Surgical History:   Procedure Laterality Date    CHOLECYSTECTOMY  2013    ENDOSCOPY  2018    ENDOSCOPY N/A 10/29/2021    Procedure: ESOPHAGOGASTRODUODENOSCOPY WITH BIOPSY;  Surgeon: Steven Meehan MD;  Location: [facility] MAIN OR;  Service: Gastroenterology;  Laterality: N/A;    ENDOSCOPY AND COLONOSCOPY N/A 2017    LYSIS OF ABDOMINAL ADHESIONS  07/10/2014    after SBO    SINUS SURGERY  2007    persistent congestion ?? turbinate reduction    TONSILLECTOMY      1978     Family History   Problem Relation Age of Onset    Hypothyroidism Mother     Rheum arthritis Maternal Aunt     Breast cancer Maternal Aunt     Glaucoma Paternal Grandmother     Breast cancer Maternal Grandmother     Migraines Daughter     Seizures Daughter         Epilepsy    Migraines Son     Colon cancer Neg Hx     Colon polyps Neg Hx     Malig Hyperthermia Neg Hx     reports that he has never smoked. He has never used smokeless tobacco. He reports current alcohol use of about 5.0 standard drinks of alcohol per week. He reports that he does not use drugs.    OBJECTIVE    Vital Signs:   /73   Pulse 95   Temp 98.4 °F (36.9 °C)   Ht 180 cm (70.87\")   Wt 104 kg (230 lb)   SpO2 98%   BMI 32.20 kg/m²     Physical Exam  Constitutional:       Appearance: Normal appearance. He is normal weight.   HENT:      Right Ear: Tympanic membrane normal.      Left Ear: Tympanic membrane normal.      Mouth/Throat:      Mouth: Mucous membranes are moist.      Pharynx: Oropharynx is clear. " Posterior oropharyngeal erythema present.   Cardiovascular:      Rate and Rhythm: Normal rate and regular rhythm.      Heart sounds: Normal heart sounds. No murmur heard.  Pulmonary:      Effort: Pulmonary effort is normal.      Breath sounds: Normal breath sounds.   Musculoskeletal:      Right lower leg: No edema.      Left lower leg: No edema.   Lymphadenopathy:      Cervical: No cervical adenopathy.   Skin:     General: Skin is warm and dry.   Neurological:      Mental Status: He is alert.   Psychiatric:         Mood and Affect: Mood normal.         Behavior: Behavior normal.         Thought Content: Thought content normal.            The following data was reviewed by: Hi Carnes MD on 11/08/2023:  CMP          11/22/2022    09:18 12/6/2022    14:16 5/25/2023    08:56   CMP   Glucose 105  142  127    BUN 15  14  14    Creatinine 1.10  1.04  1.17    EGFR  85.3     Sodium 138  138  141    Potassium 4.5  4.3  4.6    Chloride 100  101  104    Calcium 9.6  9.6  10.1    Total Protein 7.0   7.2    Total Protein  7.5     Albumin 4.90  4.60  4.7    Globulin 2.1   2.5    Globulin  2.9     Total Bilirubin 0.5  0.8  0.8    Alkaline Phosphatase 76  74  61    AST (SGOT) 23  26  24    ALT (SGPT) 45  42  41    Albumin/Globulin Ratio  1.6     BUN/Creatinine Ratio 13.6  13.5  12.0    Anion Gap  10.1       CBC w/diff          3/28/2023    14:52 7/18/2023    14:41 11/7/2023    15:40   CBC w/Diff   WBC 5.15  4.15  6.29    RBC 4.72  4.66  4.37    Hemoglobin 13.4  14.0  13.3    Hematocrit 40.5  41.6  39.2    MCV 85.8  89.3  89.7    MCH 28.4  30.0  30.4    MCHC 33.1  33.7  33.9    RDW 14.4  14.3  14.0    Platelets 178  164  170    Neutrophil Rel % 60.4  53.3  61.7    Immature Granulocyte Rel % 0.2  0.2  0.2    Lymphocyte Rel % 32.4  35.9  28.1    Monocyte Rel % 5.6  6.5  6.8    Eosinophil Rel % 1.0  3.6  2.9    Basophil Rel % 0.4  0.5  0.3      Lipid Panel          11/22/2022    09:18 5/25/2023    08:56   Lipid Panel   Total  Cholesterol 94     Triglycerides 115     HDL Cholesterol 33  36    VLDL Cholesterol 21     LDL Cholesterol  40       TSH          11/22/2022    09:18   TSH   TSH 2.310      Data reviewed : Previous PCP note              ASSESSMENT & PLAN     Diagnoses and all orders for this visit:    1. Bronchitis (Primary)  -     benzonatate (Tessalon Perles) 100 MG capsule; Take 1 capsule by mouth 3 (Three) Times a Day As Needed for Cough.  Dispense: 30 capsule; Refill: 2  -     fluticasone (FLONASE) 50 MCG/ACT nasal spray; 2 sprays into the nostril(s) as directed by provider Daily.  Dispense: 9.9 mL; Refill: 0  -     guaiFENesin-codeine (GUAIFENESIN AC) 100-10 MG/5ML liquid; Take 5 mL by mouth 3 (Three) Times a Day As Needed for Cough.  Dispense: 118 mL; Refill: 0      Explained lack of efficacy of antibiotics in viral disease.  Antitussives per medication orders.  Trial of nasal steroid spray  Call if shortness of breath worsens, blood in sputum, change in character of cough, development of fever or chills, inability to maintain nutrition and hydration. Avoid exposure to tobacco smoke and fumes.    Health Maintenance Due   Topic Date Due    Pneumococcal Vaccine 0-64 (1 - PCV) Never done    BMI FOLLOWUP  08/09/2022    INFLUENZA VACCINE  08/01/2023    COVID-19 Vaccine (6 - 2023-24 season) 09/01/2023        Follow Up  No follow-ups on file.    Patient/family had no further questions at this time and verbalized understanding of the plan discussed today.

## 2023-11-10 ENCOUNTER — TELEPHONE (OUTPATIENT)
Dept: INTERNAL MEDICINE | Facility: CLINIC | Age: 53
End: 2023-11-10
Payer: COMMERCIAL

## 2023-11-10 NOTE — TELEPHONE ENCOUNTER
Caller: Matias Hughes    Relationship: Self    Best call back number: [phone]     What was the call regarding: PATIENT NEEDS A PRIOR AUTHORIZATION FOR LIVALO, PLEASE ADVISE ONCE STARTED

## 2023-11-17 NOTE — TELEPHONE ENCOUNTER
Caller: Matias Hughes    Relationship: Self    Best call back number: [phone]     What was the call regarding: PATIENT WAS CALLING TO CHECK ON THIS REQUEST, PLEASE ADVISE.

## 2023-11-22 DIAGNOSIS — R73.01 IFG (IMPAIRED FASTING GLUCOSE): Primary | ICD-10-CM

## 2023-11-22 DIAGNOSIS — I10 ESSENTIAL HYPERTENSION: ICD-10-CM

## 2023-11-22 DIAGNOSIS — Z00.00 ANNUAL PHYSICAL EXAM: ICD-10-CM

## 2023-11-22 DIAGNOSIS — E78.5 DYSLIPIDEMIA: ICD-10-CM

## 2023-11-28 ENCOUNTER — TELEPHONE (OUTPATIENT)
Dept: INTERNAL MEDICINE | Facility: CLINIC | Age: 53
End: 2023-11-28
Payer: COMMERCIAL

## 2023-11-28 NOTE — TELEPHONE ENCOUNTER
Patient came into office for labs and was discussing issues getting his Livalo 2MG.     Patient has been out of this medication for 2 weeks.     He is wanting to see if he can switch to Zypitamag.    Please call patient at [phone]

## 2023-12-01 ENCOUNTER — OFFICE VISIT (OUTPATIENT)
Dept: INTERNAL MEDICINE | Facility: CLINIC | Age: 53
End: 2023-12-01
Payer: COMMERCIAL

## 2023-12-01 DIAGNOSIS — R73.01 IFG (IMPAIRED FASTING GLUCOSE): ICD-10-CM

## 2023-12-01 DIAGNOSIS — I10 ESSENTIAL HYPERTENSION: ICD-10-CM

## 2023-12-01 DIAGNOSIS — E83.110 HEREDITARY HEMOCHROMATOSIS: ICD-10-CM

## 2023-12-01 DIAGNOSIS — C44.92 SQUAMOUS CELL CARCINOMA OF SKIN: ICD-10-CM

## 2023-12-01 DIAGNOSIS — Z00.01 ENCOUNTER FOR GENERAL ADULT MEDICAL EXAMINATION WITH ABNORMAL FINDINGS: Primary | ICD-10-CM

## 2023-12-01 DIAGNOSIS — K27.9 PEPTIC ULCER DISEASE: ICD-10-CM

## 2023-12-01 DIAGNOSIS — K76.0 FATTY LIVER: ICD-10-CM

## 2023-12-01 DIAGNOSIS — L71.9 ROSACEA: ICD-10-CM

## 2023-12-01 DIAGNOSIS — E55.9 AVITAMINOSIS D: ICD-10-CM

## 2023-12-01 DIAGNOSIS — K22.719 BARRETT'S ESOPHAGUS WITH DYSPLASIA: ICD-10-CM

## 2023-12-01 DIAGNOSIS — K57.30 DIVERTICULOSIS OF LARGE INTESTINE WITHOUT HEMORRHAGE: ICD-10-CM

## 2023-12-01 DIAGNOSIS — E78.5 DYSLIPIDEMIA: ICD-10-CM

## 2023-12-01 DIAGNOSIS — K21.9 GASTROESOPHAGEAL REFLUX DISEASE WITHOUT ESOPHAGITIS: ICD-10-CM

## 2023-12-01 DIAGNOSIS — E88.810 METABOLIC SYNDROME: ICD-10-CM

## 2023-12-01 DIAGNOSIS — E66.9 OBESITY (BMI 30-39.9): ICD-10-CM

## 2023-12-01 PROCEDURE — 99396 PREV VISIT EST AGE 40-64: CPT | Performed by: INTERNAL MEDICINE

## 2023-12-01 NOTE — PROGRESS NOTES
Annual Exam      HPI  Matias Hughes is a 53 y.o. male RTC in [facility] [place], review of medical issues: Been doing well. Root canal last week.   1. HTN, new dx in 2019 - controlled telmisartan 80mg daily. Rare home log.   2. IFG/ Obesity,  Dx metabolic syndrome - has been 'a little less active'.  Not asmuch golf over year.  Diet has been 'pretty good'.  Weight has been stable on home assessment.   3. Dyslipidemia, low HDL; Metabolic Syndrome and past high average hsCRP - on Livalo, but now off due to unable to get that filled.  Insurance has denied it and then did go up in price when filled. Wonders if can use Zypitamag for cost savings.   4. Hemachromatosis - phlebotomy ~ 4x/ year in past with goal ferritin < 50 per Dr. Noonan.  No change over year.  last phelbotomy was 11/2023.    5. Vitamin D deficiency -on 1000 I.U. Vitamin D3 OTC.   6. Reflux esophagitis/ Denis's Esophagus repeat  EGD 11/2018 --> 10/2021 no dysplasia - on 40 mg Omeprazole daily. Rare breakthrough sx. Swallwoing OK.   7. Squamous Cell CA of skin ~2014 - sees derm q 6 months, last was 8/2023. NO new CA lesion, had bx that 'came back fine'.   8. Rosacea - off minocycline for years now. Rare recurrence.     Review of Systems   Constitutional: Negative for chills, fever, malaise/fatigue, weight gain and weight loss.   HENT:  Negative for congestion, hearing loss, odynophagia and sore throat.    Eyes:  Negative for discharge, double vision, pain and redness.   Cardiovascular:  Negative for chest pain, dyspnea on exertion, irregular heartbeat, leg swelling, near-syncope, palpitations and syncope.   Respiratory:  Negative for cough and shortness of breath.    Endocrine: Negative for polydipsia, polyphagia and polyuria.   Hematologic/Lymphatic: Negative for bleeding problem. Does not bruise/bleed easily.   Skin:  Negative for rash and suspicious lesions.   Musculoskeletal:  Positive for joint pain (tennis elbow on R). Negative for joint swelling, muscle cramps,  muscle weakness and myalgias.   Gastrointestinal:  Negative for constipation, diarrhea, dysphagia, heartburn, nausea and vomiting.   Genitourinary:  Negative for dysuria, frequency and hematuria.   Neurological:  Negative for dizziness, headaches and light-headedness.   Psychiatric/Behavioral:  Negative for depression. The patient does not have insomnia and is not nervous/anxious.    Allergic/Immunologic: Negative for persistent infections.       Problem List:    Patient Active Problem List   Diagnosis    Hemochromatosis    Squamous cell carcinoma of skin    Avitaminosis D    Rosacea    Dyslipidemia    IFG (impaired fasting glucose)    Obesity (BMI 30-39.9)    Fatty liver    Ventral hernia without obstruction or gangrene    Denis's esophagus with dysplasia    Peptic ulcer disease    Diverticulosis of large intestine without hemorrhage    Upper back pain, chronic    Essential hypertension    Gastroesophageal reflux disease    Metabolic syndrome       Medical History:    Past Medical History:   Diagnosis Date    Acute prostatitis 10/11/2018    10/2018; on background of chronic prostatitis     Diverticulosis     Diverticulosis of large intestine without hemorrhage 11/2/2018    GERD (gastroesophageal reflux disease)     H/O hypogonadism     Hemochromatosis     heterozygous for C282Y mutation hx elevated LFT's Ferritin elevated at dx    History of cholelithiasis     History of chronic prostatitis     2010 tx'd by Dr Cooper    History of Clostridium difficile colitis     Hospitalized June 2013; with SBO    History of small bowel obstruction     related to adhesions from gallbladder surgery adhesions; 2013; 2015; 2016    Overweight     Prehypertension     Rosacea 6/2/2016    Squamous cell carcinoma of skin     2012 sees derm q 6 months (Dr. Mitchell)    Vitamin D deficiency         Social History:    Social History     Socioeconomic History    Marital status:      Spouse name: Shante    Number of children: 2    Years  of education: College   Tobacco Use    Smoking status: Never    Smokeless tobacco: Never   Vaping Use    Vaping Use: Never used   Substance and Sexual Activity    Alcohol use: Yes     Alcohol/week: 5.0 standard drinks of alcohol     Types: 5 Cans of beer per week     Comment: 2-5 drinks/ week    Drug use: No    Sexual activity: Yes     Partners: Female     Birth control/protection: None     Comment: wife only; no hx STD's       Family History:   Family History   Problem Relation Age of Onset    Hypothyroidism Mother     Rheum arthritis Maternal Aunt     Breast cancer Maternal Aunt     Glaucoma Paternal Grandmother     Breast cancer Maternal Grandmother     Migraines Daughter     Seizures Daughter         Epilepsy    Migraines Son     Colon cancer Neg Hx     Colon polyps Neg Hx     Malig Hyperthermia Neg Hx        Surgical History:   Past Surgical History:   Procedure Laterality Date    CHOLECYSTECTOMY  2013    ENDOSCOPY  2018    ENDOSCOPY N/A 10/29/2021    Procedure: ESOPHAGOGASTRODUODENOSCOPY WITH BIOPSY;  Surgeon: Steven Meehan MD;  [place] [facility] [place];  Service: Gastroenterology;  Laterality: N/A;    ENDOSCOPY AND COLONOSCOPY N/A 2017    LYSIS OF ABDOMINAL ADHESIONS  07/10/2014    after SBO    SINUS SURGERY  2007    persistent congestion ?? turbinate reduction    TONSILLECTOMY      1978         Current Outpatient Medications:     cholecalciferol (VITAMIN D3) 1000 units tablet, Take 1 tablet by mouth Daily., Disp: 30 tablet, Rfl: 5    metroNIDAZOLE (Metrogel) 1 % gel, Apply  topically to the appropriate area as directed Daily., Disp: 60 g, Rfl: 2    omeprazole (priLOSEC) 40 MG capsule, TAKE ONE CAPSULE BY MOUTH DAILY, Disp: 90 capsule, Rfl: 3    Pitavastatin Magnesium (Zypitamag) 2 MG tablet, Take 1 tablet by mouth Daily., Disp: 90 tablet, Rfl: 2    psyllium (METAMUCIL) 58.6 % powder, Take  by mouth Daily., Disp: , Rfl: 12    telmisartan (MICARDIS) 80 MG tablet, TAKE ONE TABLET BY MOUTH DAILY, Disp: 90  tablet, Rfl: 2    There were no vitals filed for this visit.  There is no height or weight on file to calculate BMI.    Physical Exam  Vitals reviewed.   Constitutional:       General: He is not in acute distress.     Appearance: Normal appearance. He is well-developed. He is obese. He is not ill-appearing or toxic-appearing.   HENT:      Head: Normocephalic and atraumatic.      Right Ear: Hearing, tympanic membrane, ear canal and external ear normal. There is no impacted cerumen.      Left Ear: Hearing, tympanic membrane, ear canal and external ear normal. There is no impacted cerumen.      Nose: Nose normal.      Mouth/Throat:      Mouth: Mucous membranes are moist. No oral lesions.      Tongue: No lesions.      Pharynx: Oropharynx is clear. Uvula midline. No pharyngeal swelling, oropharyngeal exudate, posterior oropharyngeal erythema or uvula swelling.   Eyes:      General: Lids are normal. No scleral icterus.        Right eye: No discharge.         Left eye: No discharge.      Extraocular Movements: Extraocular movements intact.      Conjunctiva/sclera: Conjunctivae normal.      Pupils: Pupils are equal, round, and reactive to light.   Neck:      Thyroid: No thyroid mass or thyromegaly.      Vascular: No carotid bruit.   Cardiovascular:      Rate and Rhythm: Normal rate and regular rhythm.      Pulses:           Radial pulses are 2+ on the right side and 2+ on the left side.        Dorsalis pedis pulses are 2+ on the right side and 2+ on the left side.        Posterior tibial pulses are 2+ on the right side and 2+ on the left side.      Heart sounds: Normal heart sounds, S1 normal and S2 normal. No murmur heard.     No friction rub. No gallop.   Pulmonary:      Effort: Pulmonary effort is normal. No respiratory distress.      Breath sounds: Normal breath sounds. No wheezing, rhonchi or rales.   Abdominal:      General: Bowel sounds are normal. There is no distension.      Palpations: Abdomen is soft. There is no  mass.      Tenderness: There is no abdominal tenderness. There is no guarding or rebound.   Genitourinary:     Prostate: Normal. Not enlarged and not tender.      Rectum: Normal. No external hemorrhoid. Normal anal tone.   Musculoskeletal:         General: No deformity. Normal range of motion.      Cervical back: Full passive range of motion without pain, normal range of motion and neck supple.      Right lower leg: No edema.      Left lower leg: No edema.   Lymphadenopathy:      Cervical: No cervical adenopathy.      Right cervical: No superficial, deep or posterior cervical adenopathy.     Left cervical: No superficial, deep or posterior cervical adenopathy.      Upper Body:      Right upper body: No supraclavicular, axillary or pectoral adenopathy.      Left upper body: No supraclavicular, axillary or pectoral adenopathy.   Skin:     General: Skin is warm and dry.      Findings: No rash.   Neurological:      Mental Status: He is alert and oriented to person, place, and time.      Cranial Nerves: No cranial nerve deficit.      Sensory: No sensory deficit.      Motor: No weakness, tremor, atrophy or abnormal muscle tone.      Gait: Gait normal.      Deep Tendon Reflexes: Reflexes are normal and symmetric.      Reflex Scores:       Patellar reflexes are 2+ on the right side and 2+ on the left side.       Achilles reflexes are 2+ on the right side and 2+ on the left side.  Psychiatric:         Attention and Perception: Attention normal.         Mood and Affect: Mood normal.         Speech: Speech normal.         Behavior: Behavior normal. Behavior is cooperative.         Thought Content: Thought content normal.         Assessment/ Plan  Diagnoses and all orders for this visit:    Encounter for general adult medical examination with abnormal findings    Essential hypertension    Denis's esophagus with dysplasia    Diverticulosis of large intestine without hemorrhage    Dyslipidemia    Fatty liver    Gastroesophageal  reflux disease without esophagitis    Avitaminosis D    Hereditary hemochromatosis    IFG (impaired fasting glucose)    Metabolic syndrome    Obesity (BMI 30-39.9)    Squamous cell carcinoma of skin    Peptic ulcer disease    Rosacea        Return in about 6 months (around 6/1/2024) for Recheck.      Discussion:  Matias Hughes is a 53 y.o. male RTC in [facility] [place], [place], review of medical issues:   1. HTN, new dx in 2019 - controlled telmisartan 80mg daily. BMP OK.  2. IFG/ Obesity,  Dx metabolic syndrome -  overall stable, A1C remains normal with discordant elevated FBS.   TLC diet mods and aggressive exercise addition advised for goal of weight loss. Trend.   3. Dyslipidemia, low HDL; Metabolic Syndrome and past high average hsCRP; did not tolerate Atorvastatin due muscle aching and myalgias - on Livalo, insurance issues. Will trial Zypitamag for affordability, ERx'd.  Trend lipids back on statin.   4. Hemachromatosis - phlebotomy ~ 3-4x/ year in past with goal ferritin < 50 per Dr. Noonan.  UTD  [id number].  5. Vitamin D deficiency - on 1000 I.U. Vitamin D3 OTC.   6. Hx of SBO related to adhesions from gallbladder surgery adhesions; 2013; 2015; 2016 with recent inpt stay at [place] [facility] for recurrent SBO 4/13-4/16/21 - s/p eval at  for eval and CT enteroclysis, mild enteroperitoneal adhesions in R lower and L midabdomen. S/p eval with surgery and no intervention advised.  ROBERT.     7. Reflux esophagitis/ Denis's Esophagus repeat  EGD 11/2018 --> 10/2021 no dysplasia - controlled on 40 mg Omeprazole daily.   Repeat 3 years EGD, ~2024.  8. Squamous Cell CA of skin ~2014 - sees derm q 6 months, UTD 8/2023.   9. Rosacea, off minocycline for years - Rare recurrence. MetroGel PRN flares.  10. HM - labs d/w pt; Flu/ Tdap/ Hep A/ Shingrix/ COVID - UTD;  C-scope UTD 2011 --> 10/2017 --> 10 years; NAGI OK, PSA next labs; Hep C Ab (-) 8/2020; add more exercise.     RTC 6 months, F labs prior (CMP, FLP, A1C, PSA)

## 2023-12-13 ENCOUNTER — TELEPHONE (OUTPATIENT)
Dept: INTERNAL MEDICINE | Facility: CLINIC | Age: 53
End: 2023-12-13

## 2023-12-13 ENCOUNTER — TELEPHONE (OUTPATIENT)
Dept: INTERNAL MEDICINE | Facility: CLINIC | Age: 53
End: 2023-12-13
Payer: COMMERCIAL

## 2023-12-13 RX ORDER — PITAVASTATIN MAGNESIUM 2 MG/1
2 TABLET, FILM COATED ORAL DAILY
Qty: 90 TABLET | Refills: 2 | Status: SHIPPED | OUTPATIENT
Start: 2023-12-13 | End: 2023-12-13

## 2023-12-13 RX ORDER — PITAVASTATIN 2 MG/1
2 TABLET, FILM COATED ORAL NIGHTLY
Qty: 90 TABLET | Refills: 2 | Status: SHIPPED | OUTPATIENT
Start: 2023-12-13

## 2023-12-13 NOTE — TELEPHONE ENCOUNTER
Caller: Matias Hughes    Relationship: Self    Best call back number: [phone]     What medication are you requesting: PITAVASTATIN OR LOVALO    If a prescription is needed, what is your preferred pharmacy and phone number: [facility] - [place], [place] - [address] [facility] - [phone] [facility] [phone]      Additional notes: PATIENT STATES THAT THE ZYPITAMAG NAME BRAND WAS TOO EXPENSIVE, BUT THE GENERIC OR LOVALO BRAND WOULD BE CHEAPER THROUGH THE PHARMACY. REQUESTS UPDATED PRESCRIPTION SENT TO [facility]

## 2024-02-14 ENCOUNTER — OFFICE VISIT (OUTPATIENT)
Dept: GASTROENTEROLOGY | Facility: CLINIC | Age: 54
End: 2024-02-14
Payer: COMMERCIAL

## 2024-02-14 ENCOUNTER — HOSPITAL ENCOUNTER (OUTPATIENT)
Dept: GENERAL RADIOLOGY | Facility: HOSPITAL | Age: 54
Discharge: HOME | End: 2024-02-14
Payer: COMMERCIAL

## 2024-02-14 VITALS
SYSTOLIC BLOOD PRESSURE: 110 MMHG | BODY MASS INDEX: 30.14 KG/M2 | WEIGHT: 215.3 LBS | HEIGHT: 71 IN | DIASTOLIC BLOOD PRESSURE: 70 MMHG | OXYGEN SATURATION: 100 % | HEART RATE: 99 BPM | TEMPERATURE: 97.1 F

## 2024-02-14 DIAGNOSIS — K21.9 GASTROESOPHAGEAL REFLUX DISEASE WITHOUT ESOPHAGITIS: ICD-10-CM

## 2024-02-14 DIAGNOSIS — K56.609 SMALL BOWEL OBSTRUCTION: ICD-10-CM

## 2024-02-14 DIAGNOSIS — R11.0 NAUSEA: ICD-10-CM

## 2024-02-14 DIAGNOSIS — R14.0 ABDOMINAL BLOATING: ICD-10-CM

## 2024-02-14 DIAGNOSIS — K56.609 SMALL BOWEL OBSTRUCTION: Primary | ICD-10-CM

## 2024-02-14 PROCEDURE — 74018 RADEX ABDOMEN 1 VIEW: CPT

## 2024-02-14 RX ORDER — ONDANSETRON 4 MG/1
4 TABLET, FILM COATED ORAL EVERY 8 HOURS PRN
Qty: 20 TABLET | Refills: 0 | Status: SHIPPED | OUTPATIENT
Start: 2024-02-14 | End: 2024-02-29

## 2024-02-15 ENCOUNTER — TELEPHONE (OUTPATIENT)
Dept: GASTROENTEROLOGY | Facility: CLINIC | Age: 54
End: 2024-02-15
Payer: COMMERCIAL

## 2024-02-15 ENCOUNTER — HOSPITAL ENCOUNTER (OUTPATIENT)
Facility: HOSPITAL | Age: 54
Setting detail: OBSERVATION
LOS: 2 days | Discharge: HOME | End: 2024-02-17
Attending: INTERNAL MEDICINE | Admitting: INTERNAL MEDICINE
Payer: COMMERCIAL

## 2024-02-15 PROBLEM — Z87.19 HISTORY OF SMALL BOWEL OBSTRUCTION: Chronic | Status: ACTIVE | Noted: 2024-02-15

## 2024-02-15 PROBLEM — Z87.19 HISTORY OF SMALL BOWEL OBSTRUCTION: Status: ACTIVE | Noted: 2024-02-15

## 2024-02-15 PROBLEM — K56.609 SBO (SMALL BOWEL OBSTRUCTION): Status: ACTIVE | Noted: 2024-02-15

## 2024-02-15 PROBLEM — K66.0 ABDOMINAL ADHESIONS: Chronic | Status: ACTIVE | Noted: 2024-02-15

## 2024-02-15 PROBLEM — Z98.890 HISTORY OF ABDOMINAL SURGERY: Chronic | Status: ACTIVE | Noted: 2024-02-15

## 2024-02-15 LAB
ALBUMIN SERPL-MCNC: 5.2 G/DL (ref 3.5–5.2)
ALBUMIN/GLOB SERPL: 2.1 G/DL
ALP SERPL-CCNC: 77 U/L (ref 39–117)
ALT SERPL W P-5'-P-CCNC: 51 U/L (ref 1–41)
ANION GAP SERPL CALCULATED.3IONS-SCNC: 17 MMOL/L (ref 5–15)
AST SERPL-CCNC: 27 U/L (ref 1–40)
BASOPHILS # BLD AUTO: 0.02 10*3/MM3 (ref 0–0.2)
BASOPHILS NFR BLD AUTO: 0.3 % (ref 0–1.5)
BILIRUB SERPL-MCNC: 2 MG/DL (ref 0–1.2)
BUN SERPL-MCNC: 46 MG/DL (ref 6–20)
BUN/CREAT SERPL: 25.4 (ref 7–25)
CALCIUM SPEC-SCNC: 10.1 MG/DL (ref 8.6–10.5)
CHLORIDE SERPL-SCNC: 98 MMOL/L (ref 98–107)
CO2 SERPL-SCNC: 20 MMOL/L (ref 22–29)
CREAT SERPL-MCNC: 1.81 MG/DL (ref 0.76–1.27)
D-LACTATE SERPL-SCNC: 1.6 MMOL/L (ref 0.5–2)
DEPRECATED RDW RBC AUTO: 41.9 FL (ref 37–54)
EGFRCR SERPLBLD CKD-EPI 2021: 43.6 ML/MIN/1.73
EOSINOPHIL # BLD AUTO: 0.03 10*3/MM3 (ref 0–0.4)
EOSINOPHIL NFR BLD AUTO: 0.4 % (ref 0.3–6.2)
ERYTHROCYTE [DISTWIDTH] IN BLOOD BY AUTOMATED COUNT: 13.5 % (ref 12.3–15.4)
GLOBULIN UR ELPH-MCNC: 2.5 GM/DL
GLUCOSE SERPL-MCNC: 104 MG/DL (ref 65–99)
HCT VFR BLD AUTO: 48.4 % (ref 37.5–51)
HGB BLD-MCNC: 16.3 G/DL (ref 13–17.7)
IMM GRANULOCYTES # BLD AUTO: 0.02 10*3/MM3 (ref 0–0.05)
IMM GRANULOCYTES NFR BLD AUTO: 0.3 % (ref 0–0.5)
INR PPP: 1.1 (ref 0.9–1.1)
LYMPHOCYTES # BLD AUTO: 1.62 10*3/MM3 (ref 0.7–3.1)
LYMPHOCYTES NFR BLD AUTO: 22.3 % (ref 19.6–45.3)
MAGNESIUM SERPL-MCNC: 2.5 MG/DL (ref 1.6–2.6)
MCH RBC QN AUTO: 29.1 PG (ref 26.6–33)
MCHC RBC AUTO-ENTMCNC: 33.7 G/DL (ref 31.5–35.7)
MCV RBC AUTO: 86.4 FL (ref 79–97)
MONOCYTES # BLD AUTO: 0.64 10*3/MM3 (ref 0.1–0.9)
MONOCYTES NFR BLD AUTO: 8.8 % (ref 5–12)
NEUTROPHILS NFR BLD AUTO: 4.95 10*3/MM3 (ref 1.7–7)
NEUTROPHILS NFR BLD AUTO: 67.9 % (ref 42.7–76)
NRBC BLD AUTO-RTO: 0 /100 WBC (ref 0–0.2)
PHOSPHATE SERPL-MCNC: 4.9 MG/DL (ref 2.5–4.5)
PLATELET # BLD AUTO: 204 10*3/MM3 (ref 140–450)
PMV BLD AUTO: 10 FL (ref 6–12)
POTASSIUM SERPL-SCNC: 4.1 MMOL/L (ref 3.5–5.2)
PROT SERPL-MCNC: 7.7 G/DL (ref 6–8.5)
PROTHROMBIN TIME: 14.3 SECONDS (ref 11.7–14.2)
QT INTERVAL: 394 MS
QTC INTERVAL: 422 MS
RBC # BLD AUTO: 5.6 10*6/MM3 (ref 4.14–5.8)
SODIUM SERPL-SCNC: 135 MMOL/L (ref 136–145)
WBC NRBC COR # BLD AUTO: 7.28 10*3/MM3 (ref 3.4–10.8)

## 2024-02-15 PROCEDURE — 80053 COMPREHEN METABOLIC PANEL: CPT | Performed by: INTERNAL MEDICINE

## 2024-02-15 PROCEDURE — 85610 PROTHROMBIN TIME: CPT | Performed by: INTERNAL MEDICINE

## 2024-02-15 PROCEDURE — 85025 COMPLETE CBC W/AUTO DIFF WBC: CPT | Performed by: INTERNAL MEDICINE

## 2024-02-15 PROCEDURE — 93010 ELECTROCARDIOGRAM REPORT: CPT | Performed by: INTERNAL MEDICINE

## 2024-02-15 PROCEDURE — 99213 OFFICE O/P EST LOW 20 MIN: CPT | Performed by: SURGERY

## 2024-02-15 PROCEDURE — 25810000003 SODIUM CHLORIDE 0.9 % SOLUTION: Performed by: INTERNAL MEDICINE

## 2024-02-15 PROCEDURE — 93005 ELECTROCARDIOGRAM TRACING: CPT | Performed by: INTERNAL MEDICINE

## 2024-02-15 PROCEDURE — 83605 ASSAY OF LACTIC ACID: CPT | Performed by: INTERNAL MEDICINE

## 2024-02-15 PROCEDURE — 96374 THER/PROPH/DIAG INJ IV PUSH: CPT

## 2024-02-15 PROCEDURE — 83735 ASSAY OF MAGNESIUM: CPT | Performed by: INTERNAL MEDICINE

## 2024-02-15 PROCEDURE — 84100 ASSAY OF PHOSPHORUS: CPT | Performed by: INTERNAL MEDICINE

## 2024-02-15 RX ORDER — ACETAMINOPHEN 650 MG/1
650 SUPPOSITORY RECTAL EVERY 4 HOURS PRN
Status: DISCONTINUED | OUTPATIENT
Start: 2024-02-15 | End: 2024-02-17 | Stop reason: HOSPADM

## 2024-02-15 RX ORDER — SODIUM CHLORIDE 0.9 % (FLUSH) 0.9 %
10 SYRINGE (ML) INJECTION AS NEEDED
Status: DISCONTINUED | OUTPATIENT
Start: 2024-02-15 | End: 2024-02-17 | Stop reason: HOSPADM

## 2024-02-15 RX ORDER — NALOXONE HCL 0.4 MG/ML
0.4 VIAL (ML) INJECTION
Status: DISCONTINUED | OUTPATIENT
Start: 2024-02-15 | End: 2024-02-17 | Stop reason: HOSPADM

## 2024-02-15 RX ORDER — BISACODYL 5 MG/1
5 TABLET, DELAYED RELEASE ORAL DAILY PRN
Status: DISCONTINUED | OUTPATIENT
Start: 2024-02-15 | End: 2024-02-17 | Stop reason: HOSPADM

## 2024-02-15 RX ORDER — SODIUM CHLORIDE 9 MG/ML
125 INJECTION, SOLUTION INTRAVENOUS CONTINUOUS
Status: DISCONTINUED | OUTPATIENT
Start: 2024-02-15 | End: 2024-02-15

## 2024-02-15 RX ORDER — POLYETHYLENE GLYCOL 3350 17 G/17G
17 POWDER, FOR SOLUTION ORAL DAILY PRN
Status: DISCONTINUED | OUTPATIENT
Start: 2024-02-15 | End: 2024-02-17 | Stop reason: HOSPADM

## 2024-02-15 RX ORDER — AMOXICILLIN 250 MG
2 CAPSULE ORAL 2 TIMES DAILY PRN
Status: DISCONTINUED | OUTPATIENT
Start: 2024-02-15 | End: 2024-02-17 | Stop reason: HOSPADM

## 2024-02-15 RX ORDER — BISACODYL 10 MG
10 SUPPOSITORY, RECTAL RECTAL DAILY PRN
Status: DISCONTINUED | OUTPATIENT
Start: 2024-02-15 | End: 2024-02-17 | Stop reason: HOSPADM

## 2024-02-15 RX ORDER — ACETAMINOPHEN 325 MG/1
650 TABLET ORAL EVERY 4 HOURS PRN
Status: DISCONTINUED | OUTPATIENT
Start: 2024-02-15 | End: 2024-02-17 | Stop reason: HOSPADM

## 2024-02-15 RX ORDER — HYDROMORPHONE HYDROCHLORIDE 1 MG/ML
0.5 INJECTION, SOLUTION INTRAMUSCULAR; INTRAVENOUS; SUBCUTANEOUS
Status: DISCONTINUED | OUTPATIENT
Start: 2024-02-15 | End: 2024-02-17 | Stop reason: HOSPADM

## 2024-02-15 RX ORDER — PANTOPRAZOLE SODIUM 40 MG/10ML
40 INJECTION, POWDER, LYOPHILIZED, FOR SOLUTION INTRAVENOUS EVERY 12 HOURS SCHEDULED
Status: DISCONTINUED | OUTPATIENT
Start: 2024-02-15 | End: 2024-02-17 | Stop reason: HOSPADM

## 2024-02-15 RX ORDER — ONDANSETRON 2 MG/ML
4 INJECTION INTRAMUSCULAR; INTRAVENOUS EVERY 6 HOURS PRN
Status: DISCONTINUED | OUTPATIENT
Start: 2024-02-15 | End: 2024-02-17 | Stop reason: HOSPADM

## 2024-02-15 RX ORDER — HYDROCODONE BITARTRATE AND ACETAMINOPHEN 7.5; 325 MG/1; MG/1
1 TABLET ORAL EVERY 4 HOURS PRN
Status: DISCONTINUED | OUTPATIENT
Start: 2024-02-15 | End: 2024-02-17 | Stop reason: HOSPADM

## 2024-02-15 RX ORDER — ACETAMINOPHEN 160 MG/5ML
650 SOLUTION ORAL EVERY 4 HOURS PRN
Status: DISCONTINUED | OUTPATIENT
Start: 2024-02-15 | End: 2024-02-17 | Stop reason: HOSPADM

## 2024-02-15 RX ORDER — SODIUM CHLORIDE 0.9 % (FLUSH) 0.9 %
10 SYRINGE (ML) INJECTION EVERY 12 HOURS SCHEDULED
Status: DISCONTINUED | OUTPATIENT
Start: 2024-02-15 | End: 2024-02-17 | Stop reason: HOSPADM

## 2024-02-15 RX ORDER — ONDANSETRON 4 MG/1
4 TABLET, ORALLY DISINTEGRATING ORAL EVERY 6 HOURS PRN
Status: DISCONTINUED | OUTPATIENT
Start: 2024-02-15 | End: 2024-02-17 | Stop reason: HOSPADM

## 2024-02-15 RX ORDER — KETOROLAC TROMETHAMINE 15 MG/ML
15 INJECTION, SOLUTION INTRAMUSCULAR; INTRAVENOUS EVERY 6 HOURS PRN
Status: DISCONTINUED | OUTPATIENT
Start: 2024-02-15 | End: 2024-02-17 | Stop reason: HOSPADM

## 2024-02-15 RX ORDER — CALCIUM CARBONATE 500 MG/1
2 TABLET, CHEWABLE ORAL 2 TIMES DAILY PRN
Status: DISCONTINUED | OUTPATIENT
Start: 2024-02-15 | End: 2024-02-17 | Stop reason: HOSPADM

## 2024-02-15 RX ORDER — DEXTROSE AND SODIUM CHLORIDE 5; .45 G/100ML; G/100ML
125 INJECTION, SOLUTION INTRAVENOUS CONTINUOUS
Status: DISCONTINUED | OUTPATIENT
Start: 2024-02-16 | End: 2024-02-16

## 2024-02-15 RX ORDER — SODIUM CHLORIDE 9 MG/ML
40 INJECTION, SOLUTION INTRAVENOUS AS NEEDED
Status: DISCONTINUED | OUTPATIENT
Start: 2024-02-15 | End: 2024-02-17 | Stop reason: HOSPADM

## 2024-02-15 RX ADMIN — Medication 10 ML: at 21:03

## 2024-02-15 RX ADMIN — PANTOPRAZOLE SODIUM 40 MG: 40 INJECTION, POWDER, FOR SOLUTION INTRAVENOUS at 21:03

## 2024-02-15 RX ADMIN — SODIUM CHLORIDE 125 ML/HR: 9 INJECTION, SOLUTION INTRAVENOUS at 17:07

## 2024-02-15 NOTE — H&P
PCP: Bernard Souza MD    Chief complaint small bowel obstruction    HPI  Patient is a 53 y.o. male presents with history of small bowel obstructions x 4-5, with a history of lysis of adhesions back in 2014 and cholecystectomy in 2013 who presents to primary care doctor's office with abdominal pain nausea and vomiting.  Patient was just at [facility] 3 weeks ago for small bowel obstruction.  He improved and was doing okay for about 2 weeks and then about 4 days ago he ate a bowl of Cheerios and started having abdominal pain with nausea vomiting.  He put himself on clears and he tried some pudding and Ensure but that caused him to vomit.  Followed up with primary care doctor and patient has seen surgery as an outpatient before and they thought that he may need lysis of adhesions again however they want to wait till patient became symptomatic.   Patient also says he has had diarrhea 7-10 times a day he normally goes 1-2 times a day but denies any fevers or chills.    PAST MEDICAL HISTORY  Past Medical History:   Diagnosis Date    Acute prostatitis 10/11/2018    10/2018; on background of chronic prostatitis     Denis's esophagus with dysplasia 10/30/2017    On EGD 10/2017; repeat EGD in 10 weeks improved --> EGD 11/2018 per Dr. Meehan --> 10/2021 with no IM in esophagus but present in antrum --> 3 years    Diverticulosis     Diverticulosis of large intestine without hemorrhage 11/02/2018    Fatty liver 06/22/2017    Noted on 6/2017 CT and again on 6/2021 MRI abdomen 'liver demonstrates minimal loss of signal on the out of phase T1-weighted images most consistent with steatosis'    GERD (gastroesophageal reflux disease)     H/O hypogonadism     Hemochromatosis     heterozygous for C282Y mutation hx elevated LFT's Ferritin elevated at dx    History of cholelithiasis     History of chronic prostatitis     2010 tx'd by Dr Cooper    History of Clostridium difficile colitis     Hospitalized June 2013; with SBO    History of  small bowel obstruction     related to adhesions from gallbladder surgery adhesions; 2013; 2015; 2016    IFG (impaired fasting glucose) 06/09/2017    Normal A1C    Metabolic syndrome 09/03/2021    IFG, truncal obesity, low HDL, HTN, and fatty liver meets criteria for metabolic syndrome    Overweight     Peptic ulcer disease 11/17/2017    Prehypertension     Rosacea 06/02/2016    Squamous cell carcinoma of skin     2012 sees derm q 6 months (Dr. Mitchell)    Upper back pain, chronic 07/23/2019    Ventral hernia without obstruction or gangrene 06/22/2017    Tiny, on CT 6/2017    Vitamin D deficiency        PAST SURGICAL HISTORY  Past Surgical History:   Procedure Laterality Date    CHOLECYSTECTOMY  2013    ENDOSCOPY  2018    ENDOSCOPY N/A 10/29/2021    Procedure: ESOPHAGOGASTRODUODENOSCOPY WITH BIOPSY;  Surgeon: Steven Meehan MD;  Location: [facility] MAIN OR;  Service: Gastroenterology;  Laterality: N/A;    ENDOSCOPY AND COLONOSCOPY N/A 2017    LYSIS OF ABDOMINAL ADHESIONS  07/10/2014    after SBO    SINUS SURGERY  2007    persistent congestion ?? turbinate reduction    TONSILLECTOMY      1978       FAMILY HISTORY  Family History   Problem Relation Age of Onset    Hypothyroidism Mother     Rheum arthritis Maternal Aunt     Breast cancer Maternal Aunt     Breast cancer Maternal Grandmother     Glaucoma Paternal Grandmother     Migraines Daughter     Seizures Daughter         Epilepsy    Migraines Son     Colon cancer Neg Hx     Colon polyps Neg Hx     Malig Hyperthermia Neg Hx     Crohn's disease Neg Hx     Irritable bowel syndrome Neg Hx     Ulcerative colitis Neg Hx        SOCIAL HISTORY  Social History     Tobacco Use    Smoking status: Never    Smokeless tobacco: Never   Vaping Use    Vaping Use: Never used   Substance Use Topics    Alcohol use: Yes     Alcohol/week: 5.0 standard drinks of alcohol     Types: 5 Cans of beer per week     Comment: 2-5 drinks/ week    Drug use: No       MEDICATIONS:  Medications Prior  to Admission   Medication Sig Dispense Refill Last Dose    omeprazole (priLOSEC) 40 MG capsule TAKE ONE CAPSULE BY MOUTH DAILY 90 capsule 3 2/14/2024    pitavastatin calcium (LIVALO) 2 MG tablet tablet Take 1 tablet by mouth Every Night. 90 tablet 2 Past Week    psyllium (METAMUCIL) 58.6 % powder Take  by mouth Daily.  12 Past Week    telmisartan (MICARDIS) 80 MG tablet TAKE ONE TABLET BY MOUTH DAILY 90 tablet 2 Past Week    cholecalciferol (VITAMIN D3) 1000 units tablet Take 1 tablet by mouth Daily. 30 tablet 5 Unknown    metroNIDAZOLE (Metrogel) 1 % gel Apply  topically to the appropriate area as directed Daily. 60 g 2     ondansetron (ZOFRAN) 4 MG tablet Take 1 tablet by mouth Every 8 (Eight) Hours As Needed for Nausea or Vomiting for up to 15 days. 20 tablet 0 Unknown    riFAXIMin (Xifaxan) 550 MG tablet Take 1 tablet by mouth 3 (Three) Times a Day for 42 days. 42 tablet 2 Unknown       Allergies:  Cefdinir    Review of Systems:  fatigue  diarrhea, nausea, vomiting, pain    Negative for following (except as per HPI):  Constitution: chills, fevers,   Eyes: change of vision, loss of vision and discharge  ENT: ear drainage, ear ringing and facial trauma  Respiratory: cough, pleuritic pain, shortness of air  Cardiovascular: chest pressure, pain, lower extremity edema, palpitations  Gastrointestinal: constipation,   Integument: rash and wound  Hematologic / Lymphatic: excessive bleeding and easy bruising  Musculoskeletal: joint pain, joint stiffness, joint swelling and muscle pain  Neurological: headaches, numbness, seizures and tremors  Behavioral / Psych: anxiety, depression and hallucinations         Vital Signs  Temp:  [97.5 °F (36.4 °C)] 97.5 °F (36.4 °C)  Heart Rate:  [74] 74  Resp:  [14] 14  BP: (97)/(65) 97/65     There is no height or weight on file to calculate BMI.    Physical Exam:  General Appearance:    Alert, cooperative, in no acute distress but uncomfortable appearing   Head:    Normocephalic,  without obvious abnormality, atraumatic   Eyes:         conjunctivae and sclerae normal, no icterus, PERRLA   ENT:    Ears grossly intact, oral mucosa moist,   Neck:   No adenopathy, supple, trachea midline,        Lungs:     Clear to auscultation,respirations regular, even and                   unlabored    Heart:    Regular rhythm and normal rate,  no murmur, normal S1 and S2,   Abdomen:   Very few bowel sounds, no masses,  soft mildly tender to palpation diffusely mildly distended,    Extremities:   Moves all extremities well, no cyanosis, no edema,             Pulses:   Pulses palpable and equal bilaterally   Skin:   No bleeding, rash, bruising    Neurologic:    Psych:   Cranial nerves 2 - 12 grossly intact, sensation grossly intact,     Moves all extremities well, equal bilateral strength    Alert and Oriented x 3, Normal Affect    I washed/sanitized my hands before entering the room and immediately upon leaving the room.    LABS:  Admission on 02/15/2024   Component Date Value Ref Range Status    WBC 02/15/2024 7.28  3.40 - 10.80 10*3/mm3 Final    RBC 02/15/2024 5.60  4.14 - 5.80 10*6/mm3 Final    Hemoglobin 02/15/2024 16.3  13.0 - 17.7 g/dL Final    Hematocrit 02/15/2024 48.4  37.5 - 51.0 % Final    MCV 02/15/2024 86.4  79.0 - 97.0 fL Final    MCH 02/15/2024 29.1  26.6 - 33.0 pg Final    MCHC 02/15/2024 33.7  31.5 - 35.7 g/dL Final    RDW 02/15/2024 13.5  12.3 - 15.4 % Final    RDW-SD 02/15/2024 41.9  37.0 - 54.0 fl Final    MPV 02/15/2024 10.0  6.0 - 12.0 fL Final    Platelets 02/15/2024 204  140 - 450 10*3/mm3 Final    Neutrophil % 02/15/2024 67.9  42.7 - 76.0 % Final    Lymphocyte % 02/15/2024 22.3  19.6 - 45.3 % Final    Monocyte % 02/15/2024 8.8  5.0 - 12.0 % Final    Eosinophil % 02/15/2024 0.4  0.3 - 6.2 % Final    Basophil % 02/15/2024 0.3  0.0 - 1.5 % Final    Immature Grans % 02/15/2024 0.3  0.0 - 0.5 % Final    Neutrophils, Absolute 02/15/2024 4.95  1.70 - 7.00 10*3/mm3 Final    Lymphocytes,  Absolute 02/15/2024 1.62  0.70 - 3.10 10*3/mm3 Final    Monocytes, Absolute 02/15/2024 0.64  0.10 - 0.90 10*3/mm3 Final    Eosinophils, Absolute 02/15/2024 0.03  0.00 - 0.40 10*3/mm3 Final    Basophils, Absolute 02/15/2024 0.02  0.00 - 0.20 10*3/mm3 Final    Immature Grans, Absolute 02/15/2024 0.02  0.00 - 0.05 10*3/mm3 Final    nRBC 02/15/2024 0.0  0.0 - 0.2 /100 WBC Final    Glucose 02/15/2024 104 (H)  65 - 99 mg/dL Final    BUN 02/15/2024 46 (H)  6 - 20 mg/dL Final    Creatinine 02/15/2024 1.81 (H)  0.76 - 1.27 mg/dL Final    Sodium 02/15/2024 135 (L)  136 - 145 mmol/L Final    Potassium 02/15/2024 4.1  3.5 - 5.2 mmol/L Final    Chloride 02/15/2024 98  98 - 107 mmol/L Final    CO2 02/15/2024 20.0 (L)  22.0 - 29.0 mmol/L Final    Calcium 02/15/2024 10.1  8.6 - 10.5 mg/dL Final    Total Protein 02/15/2024 7.7  6.0 - 8.5 g/dL Final    Albumin 02/15/2024 5.2  3.5 - 5.2 g/dL Final    ALT (SGPT) 02/15/2024 51 (H)  1 - 41 U/L Final    AST (SGOT) 02/15/2024 27  1 - 40 U/L Final    Alkaline Phosphatase 02/15/2024 77  39 - 117 U/L Final    Total Bilirubin 02/15/2024 2.0 (H)  0.0 - 1.2 mg/dL Final    Globulin 02/15/2024 2.5  gm/dL Final    A/G Ratio 02/15/2024 2.1  g/dL Final    BUN/Creatinine Ratio 02/15/2024 25.4 (H)  7.0 - 25.0 Final    Anion Gap 02/15/2024 17.0 (H)  5.0 - 15.0 mmol/L Final    eGFR 02/15/2024 43.6 (L)  >60.0 mL/min/1.73 Final    Magnesium 02/15/2024 2.5  1.6 - 2.6 mg/dL Final    Protime 02/15/2024 14.3 (H)  11.7 - 14.2 Seconds Final    INR 02/15/2024 1.10  0.90 - 1.10 Final    Phosphorus 02/15/2024 4.9 (H)  2.5 - 4.5 mg/dL Final    Lactate 02/15/2024 1.6  0.5 - 2.0 mmol/L Final    QT Interval 02/15/2024 394  ms Final    QTC Interval 02/15/2024 422  ms Final         DIAGNOSTICS:  XR Abdomen KUB    Addendum Date: 2/15/2024    COMPARISON made to previous outside CT of the abdomen dated 1/24/2024 and KUB 2/14/2024  FINDINGS: The amount of gas as well as degree of distention of the small bowel is similar to  or slightly greater compared to the previous KUB from 1/25/2024. The colon gas pattern is normal and similar to the prior examination.  This report was finalized on 2/15/2024 10:21 AM by Dr. Donn Ocasio M.D on Workstation: [id number]            Results Review:   I reviewed the patient's new clinical results.  Discussed with ER physician  Old records reviewed / Medical Decision Making High Complexity  I personally viewed and interpreted the patient's EKG/Telemetry data- sinus rhythm    Creatinine at [facility] on January 24 was 0.95    ASSESSMENT AND PLAN    SBO (small bowel obstruction)    Hemochromatosis    IFG (impaired fasting glucose)    Obesity (BMI 30-39.9)    Fatty liver    Denis's esophagus with dysplasia    Peptic ulcer disease    Essential hypertension    Metabolic syndrome    History of abdominal surgery (VINOD mcguire 2014)    Abdominal adhesions    History of small bowel obstruction 2013; 2015; 2016, 2023    Nausea vomiting and abdominal pain with possibly slightly worse gas distention of small bowel on KUB   History of abdominal surgery (VINOD mcguire 2014)   History of small bowel obstruction 2013; 2015; 2016, 2023  -N.p.o.  -No NG tube at this time  -Consult surgery  -Patient is out of window as an outpatient and they were going to try Xifaxan but patient has not even filled yet.     Pelon with anion gap metabolic acidosis  -Patient has not had this in the past.  Does not appear to have been on NSAIDs.  -Not on nephrotoxic drugs at home  -Has had some decreased oral intake but still was doing fluids  -Will get a urinalysis and check for a FENA  -Will get a renal ultrasound.-Given the small bowel obstruction may be had been hydronephrosis  -Lactate slightly elevated.  May be starvation ketosis.  Will IV fluid resuscitate with 2 L normal saline and then do D5 half-normal        Denis's esophagus with dysplasia /   Peptic ulcer disease  -Continue PPI twice daily      Essential hypertension  -Stable, continue  medical management for now     H/o Hemochromatosis  - Heterozygous for C282Y mutation; hx elevated LFT's; Ferritin elevated at dx; goal ferritin < 50           Chronic medical conditions being monitored- stable, continue medical management  -  Metabolic syndrome    +DVT proph:    scd  + CODE STATUS: Full       I discussed the patient's findings and my recommendations with the patient and/or family.  Please reference all orders placed.    Romina Hernadez MD  02/15/24  16:36 EST      This document is intended for medical expert use only. Reading of this document by patients and/or patient's family without participating in medical staff guidance may result in misinterpretation and unintended morbidity. Any interpretation of such data is the responsibility of the patient and/or family member responsible for the patient in concert with their primary or specialist providers, and NOT to be left for sources of online searches such as [facility], [facility] or similar queries. Relying on these approaches to knowledge may result in misinterpretation, misguided goals of care and even death should patients or family members try recommendations outside of the realm of professional medical care in a supervised way.    Dictated utilizing Voice dictation:  Parts of this note may be an electronic transcription/translation of spoke language to printed text using Dragon dictation system.

## 2024-02-16 ENCOUNTER — APPOINTMENT (OUTPATIENT)
Dept: ULTRASOUND IMAGING | Facility: HOSPITAL | Age: 54
End: 2024-02-16
Payer: COMMERCIAL

## 2024-02-16 ENCOUNTER — APPOINTMENT (OUTPATIENT)
Dept: GENERAL RADIOLOGY | Facility: HOSPITAL | Age: 54
End: 2024-02-16
Payer: COMMERCIAL

## 2024-02-16 LAB
ALBUMIN SERPL-MCNC: 3.8 G/DL (ref 3.5–5.2)
ALBUMIN/GLOB SERPL: 1.5 G/DL
ALP SERPL-CCNC: 64 U/L (ref 39–117)
ALT SERPL W P-5'-P-CCNC: 42 U/L (ref 1–41)
ANION GAP SERPL CALCULATED.3IONS-SCNC: 15.2 MMOL/L (ref 5–15)
APAP SERPL-MCNC: <5 MCG/ML (ref 0–30)
AST SERPL-CCNC: 24 U/L (ref 1–40)
BASOPHILS # BLD AUTO: 0.01 10*3/MM3 (ref 0–0.2)
BASOPHILS NFR BLD AUTO: 0.1 % (ref 0–1.5)
BILIRUB SERPL-MCNC: 1.3 MG/DL (ref 0–1.2)
BILIRUB UR QL STRIP: NEGATIVE
BUN SERPL-MCNC: 34 MG/DL (ref 6–20)
BUN/CREAT SERPL: 32.1 (ref 7–25)
CALCIUM SPEC-SCNC: 7.9 MG/DL (ref 8.6–10.5)
CHLORIDE SERPL-SCNC: 106 MMOL/L (ref 98–107)
CLARITY UR: CLEAR
CO2 SERPL-SCNC: 13.8 MMOL/L (ref 22–29)
COLOR UR: YELLOW
CREAT SERPL-MCNC: 1.06 MG/DL (ref 0.76–1.27)
CREAT UR-MCNC: 215.7 MG/DL
DEPRECATED RDW RBC AUTO: 40.8 FL (ref 37–54)
EGFRCR SERPLBLD CKD-EPI 2021: 82.9 ML/MIN/1.73
EOSINOPHIL # BLD AUTO: 0.05 10*3/MM3 (ref 0–0.4)
EOSINOPHIL NFR BLD AUTO: 0.7 % (ref 0.3–6.2)
ERYTHROCYTE [DISTWIDTH] IN BLOOD BY AUTOMATED COUNT: 13 % (ref 12.3–15.4)
GLOBULIN UR ELPH-MCNC: 2.6 GM/DL
GLUCOSE SERPL-MCNC: 153 MG/DL (ref 65–99)
GLUCOSE UR STRIP-MCNC: NEGATIVE MG/DL
HCT VFR BLD AUTO: 40.6 % (ref 37.5–51)
HGB BLD-MCNC: 13.9 G/DL (ref 13–17.7)
HGB UR QL STRIP.AUTO: NEGATIVE
IMM GRANULOCYTES # BLD AUTO: 0.02 10*3/MM3 (ref 0–0.05)
IMM GRANULOCYTES NFR BLD AUTO: 0.3 % (ref 0–0.5)
KETONES UR QL STRIP: ABNORMAL
LEUKOCYTE ESTERASE UR QL STRIP.AUTO: NEGATIVE
LYMPHOCYTES # BLD AUTO: 1.09 10*3/MM3 (ref 0.7–3.1)
LYMPHOCYTES NFR BLD AUTO: 16.1 % (ref 19.6–45.3)
MCH RBC QN AUTO: 29.3 PG (ref 26.6–33)
MCHC RBC AUTO-ENTMCNC: 34.2 G/DL (ref 31.5–35.7)
MCV RBC AUTO: 85.7 FL (ref 79–97)
MONOCYTES # BLD AUTO: 0.56 10*3/MM3 (ref 0.1–0.9)
MONOCYTES NFR BLD AUTO: 8.3 % (ref 5–12)
NEUTROPHILS NFR BLD AUTO: 5.04 10*3/MM3 (ref 1.7–7)
NEUTROPHILS NFR BLD AUTO: 74.5 % (ref 42.7–76)
NITRITE UR QL STRIP: NEGATIVE
NRBC BLD AUTO-RTO: 0 /100 WBC (ref 0–0.2)
PH UR STRIP.AUTO: <=5 [PH] (ref 5–8)
PLATELET # BLD AUTO: 158 10*3/MM3 (ref 140–450)
PMV BLD AUTO: 9.8 FL (ref 6–12)
POTASSIUM SERPL-SCNC: 3.8 MMOL/L (ref 3.5–5.2)
PROT SERPL-MCNC: 6.4 G/DL (ref 6–8.5)
PROT UR QL STRIP: NEGATIVE
RBC # BLD AUTO: 4.74 10*6/MM3 (ref 4.14–5.8)
SALICYLATES SERPL-MCNC: <0.3 MG/DL
SODIUM SERPL-SCNC: 135 MMOL/L (ref 136–145)
SODIUM UR-SCNC: 107 MMOL/L
SP GR UR STRIP: >=1.03 (ref 1–1.03)
UROBILINOGEN UR QL STRIP: ABNORMAL
WBC NRBC COR # BLD AUTO: 6.77 10*3/MM3 (ref 3.4–10.8)

## 2024-02-16 PROCEDURE — 84300 ASSAY OF URINE SODIUM: CPT | Performed by: INTERNAL MEDICINE

## 2024-02-16 PROCEDURE — 99214 OFFICE O/P EST MOD 30 MIN: CPT | Performed by: STUDENT IN AN ORGANIZED HEALTH CARE EDUCATION/TRAINING PROGRAM

## 2024-02-16 PROCEDURE — 81003 URINALYSIS AUTO W/O SCOPE: CPT | Performed by: INTERNAL MEDICINE

## 2024-02-16 PROCEDURE — 96375 TX/PRO/DX INJ NEW DRUG ADDON: CPT

## 2024-02-16 PROCEDURE — 76775 US EXAM ABDO BACK WALL LIM: CPT

## 2024-02-16 PROCEDURE — 80179 DRUG ASSAY SALICYLATE: CPT | Performed by: STUDENT IN AN ORGANIZED HEALTH CARE EDUCATION/TRAINING PROGRAM

## 2024-02-16 PROCEDURE — 25810000003 LACTATED RINGERS PER 1000 ML: Performed by: STUDENT IN AN ORGANIZED HEALTH CARE EDUCATION/TRAINING PROGRAM

## 2024-02-16 PROCEDURE — 25010000002 HYDROMORPHONE PER 4 MG: Performed by: INTERNAL MEDICINE

## 2024-02-16 PROCEDURE — 82570 ASSAY OF URINE CREATININE: CPT | Performed by: INTERNAL MEDICINE

## 2024-02-16 PROCEDURE — 74250 X-RAY XM SM INT 1CNTRST STD: CPT

## 2024-02-16 PROCEDURE — 99213 OFFICE O/P EST LOW 20 MIN: CPT | Performed by: PHYSICIAN ASSISTANT

## 2024-02-16 PROCEDURE — 80143 DRUG ASSAY ACETAMINOPHEN: CPT | Performed by: STUDENT IN AN ORGANIZED HEALTH CARE EDUCATION/TRAINING PROGRAM

## 2024-02-16 PROCEDURE — 0 DIATRIZOATE MEGLUMINE & SODIUM PER 1 ML: Performed by: STUDENT IN AN ORGANIZED HEALTH CARE EDUCATION/TRAINING PROGRAM

## 2024-02-16 PROCEDURE — 25810000003 SODIUM CHLORIDE 0.9 % SOLUTION: Performed by: INTERNAL MEDICINE

## 2024-02-16 PROCEDURE — 96376 TX/PRO/DX INJ SAME DRUG ADON: CPT

## 2024-02-16 PROCEDURE — 80053 COMPREHEN METABOLIC PANEL: CPT | Performed by: INTERNAL MEDICINE

## 2024-02-16 PROCEDURE — 85025 COMPLETE CBC W/AUTO DIFF WBC: CPT | Performed by: INTERNAL MEDICINE

## 2024-02-16 RX ORDER — SODIUM CHLORIDE, SODIUM LACTATE, POTASSIUM CHLORIDE, CALCIUM CHLORIDE 600; 310; 30; 20 MG/100ML; MG/100ML; MG/100ML; MG/100ML
100 INJECTION, SOLUTION INTRAVENOUS CONTINUOUS
Status: DISCONTINUED | OUTPATIENT
Start: 2024-02-16 | End: 2024-02-17 | Stop reason: HOSPADM

## 2024-02-16 RX ADMIN — PANTOPRAZOLE SODIUM 40 MG: 40 INJECTION, POWDER, FOR SOLUTION INTRAVENOUS at 08:42

## 2024-02-16 RX ADMIN — Medication 10 ML: at 08:37

## 2024-02-16 RX ADMIN — SODIUM CHLORIDE 2000 ML: 9 INJECTION, SOLUTION INTRAVENOUS at 02:00

## 2024-02-16 RX ADMIN — PANTOPRAZOLE SODIUM 40 MG: 40 INJECTION, POWDER, FOR SOLUTION INTRAVENOUS at 20:21

## 2024-02-16 RX ADMIN — DEXTROSE AND SODIUM CHLORIDE 125 ML/HR: 5; 450 INJECTION, SOLUTION INTRAVENOUS at 03:55

## 2024-02-16 RX ADMIN — DIATRIZOATE MEGLUMINE AND DIATRIZOATE SODIUM 360 ML: 660; 100 LIQUID ORAL; RECTAL at 14:30

## 2024-02-16 RX ADMIN — HYDROMORPHONE HYDROCHLORIDE 0.5 MG: 1 INJECTION, SOLUTION INTRAMUSCULAR; INTRAVENOUS; SUBCUTANEOUS at 05:40

## 2024-02-16 RX ADMIN — HYDROCODONE BITARTRATE AND ACETAMINOPHEN 1 TABLET: 7.5; 325 TABLET ORAL at 04:35

## 2024-02-16 RX ADMIN — SODIUM CHLORIDE, POTASSIUM CHLORIDE, SODIUM LACTATE AND CALCIUM CHLORIDE 100 ML/HR: 600; 310; 30; 20 INJECTION, SOLUTION INTRAVENOUS at 13:31

## 2024-02-16 NOTE — PROGRESS NOTES
Name: Matias Hughes ADMIT: 2/15/2024   : 1970  PCP: Bernard Souza MD    MRN: [id number] LOS: 1 days   AGE/SEX: 53 y.o. male  ROOM: [facility]     Subjective   Subjective   Patient seen and examined this morning.  Hospital day 1.  He is awake and alert, resting comfortably in bed.  He states that he did have bowel movements overnight, abdominal pain improved at present.       Objective   Objective   Vital Signs  Temp:  [97.5 °F (36.4 °C)-98.5 °F (36.9 °C)] 98.3 °F (36.8 °C)  Heart Rate:  [74-87] 87  Resp:  [14-17] 17  BP: ()/(62-82) 142/82  SpO2:  [92 %-98 %] 92 %  on   ;   Device (Oxygen Therapy): room air  There is no height or weight on file to calculate BMI.  Physical Exam  Vitals and nursing note reviewed.   Constitutional:       General: He is awake. He is not in acute distress.     Appearance: He is overweight.   Cardiovascular:      Rate and Rhythm: Normal rate and regular rhythm.      Pulses: Normal pulses.      Heart sounds: Normal heart sounds.   Pulmonary:      Effort: Pulmonary effort is normal. No respiratory distress.      Breath sounds: Normal breath sounds.   Abdominal:      Palpations: Abdomen is soft.      Tenderness: There is no abdominal tenderness.   Skin:     General: Skin is warm and dry.   Neurological:      Mental Status: He is alert.   Psychiatric:         Behavior: Behavior is cooperative.       Results Review     I reviewed the patient's new clinical results.  Results from last 7 days   Lab Units 24  0608 02/15/24  1721   WBC 10*3/mm3 6.77 7.28   HEMOGLOBIN g/dL 13.9 16.3   PLATELETS 10*3/mm3 158 204     Results from last 7 days   Lab Units 24  0608 02/15/24  1721   SODIUM mmol/L 135* 135*   POTASSIUM mmol/L 3.8 4.1   CHLORIDE mmol/L 106 98   CO2 mmol/L 13.8* 20.0*   BUN mg/dL 34* 46*   CREATININE mg/dL 1.06 1.81*   GLUCOSE mg/dL 153* 104*   EGFR mL/min/1.73 82.9 43.6*     Results from last 7 days   Lab Units 24  0608 02/15/24  1721   ALBUMIN g/dL 3.8  "5.2   BILIRUBIN mg/dL 1.3* 2.0*   ALK PHOS U/L 64 77   AST (SGOT) U/L 24 27   ALT (SGPT) U/L 42* 51*     Results from last 7 days   Lab Units 02/16/24  0608 02/15/24  1721   CALCIUM mg/dL 7.9* 10.1   ALBUMIN g/dL 3.8 5.2   MAGNESIUM mg/dL  --  2.5   PHOSPHORUS mg/dL  --  4.9*     Results from last 7 days   Lab Units 02/15/24  1721   LACTATE mmol/L 1.6     No results found for: \"HGBA1C\", \"POCGLU\"    US Renal Bilateral    Result Date: 2/16/2024  Electronically signed by Drake Edward M.D. on 02-16-24 at 0142    XR Abdomen KUB    Addendum Date: 2/15/2024    COMPARISON made to previous outside CT of the abdomen dated 1/24/2024 and KUB 2/14/2024  FINDINGS: The amount of gas as well as degree of distention of the small bowel is similar to or slightly greater compared to the previous KUB from 1/25/2024. The colon gas pattern is normal and similar to the prior examination.  This report was finalized on 2/15/2024 10:21 AM by Dr. Donn Ocasio M.D on Workstation: [id number]       I have personally reviewed all medications:  Scheduled Medications  pantoprazole, 40 mg, Intravenous, Q12H  sodium chloride, 10 mL, Intravenous, Q12H    Infusions  dextrose 5 % and sodium chloride 0.45 %, 125 mL/hr, Last Rate: 125 mL/hr (02/16/24 0355)    Diet  NPO Diet NPO Type: Sips with Meds    I have personally reviewed:  [x]  Laboratory   [x]  Microbiology   [x]  Radiology   [x]  EKG/Telemetry  [x]  Cardiology/Vascular   []  Pathology    []  Records       Assessment/Plan     Active Hospital Problems    Diagnosis  POA    **SBO (small bowel obstruction) [K56.609]  Yes    History of abdominal surgery (maynor,VINOD 2014) [Z98.890]  Not Applicable    Abdominal adhesions [K66.0]  Yes    History of small bowel obstruction 2013; 2015; 2016, 2023 [Z87.19]  Not Applicable    Metabolic syndrome [E88.810]  Yes    Essential hypertension [I10]  Yes    Peptic ulcer disease [K27.9]  Yes    Denis's esophagus with dysplasia [K22.719]  Yes    Fatty liver [K76.0]  " Yes    Obesity (BMI 30-39.9) [E66.9]  Yes    IFG (impaired fasting glucose) [R73.01]  Yes    Hemochromatosis [E83.119]  Yes      Resolved Hospital Problems   No resolved problems to display.       53 y.o. male admitted with SBO (small bowel obstruction).    Small bowel obstruction  - Noted on imaging following arrival.  - NPO, bowel rest, IVF, anti-emetics PRN  - General Surgery consulted and following. Will continue to follow their plans/recommendations. Greatly appreciate their help.    Acute kidney injury  Dehydration  - Noted on arrival, likely pre-renal, creatinine improving with IVF. Dehydration supported by DIYA and elevated BUN/Cr ratio. Renal US negative for obstruction or acute findings. Continue IVF as prescribed, monitor with daily labs.    Hepatic steatosis  Transaminitis  - LFT elevated on most recent labs, improved from prior. No indications to warrant acute intervention for now, however, will continue to closely monitor. If no improvement and/or worsens, can consider further workup as indicated.  - Order repeat CMP in AM for reassessment.    Hypertension  - Blood pressure appears stable and acceptable acutely at this time. Holding home ARB due to dehydration/DIYA noted on arrival, can resume when appropriate.   - Trend BP to guide ongoing management decisions.    Metabolic acidosis  - Noted on arrival, with elevated anion gap, UA with ketones, could be result of starvation ketosis from decreased intake. Also, patient has been on IVF with NS which could be playing a role.  - Order lactic acid, Tylenol level, salicylate level, change IVF to LR and closely monitor to guide management decisions.    Peptic ulcer disease/Denis's esophagus  - Continue PPI as prescribed.     Obesity  - BMI 30.14 kg/m2. Complicating all medical problems.        SCDs for DVT prophylaxis.  Full code.  Discussed with patient and nursing staff.  Anticipate discharge  TBD  timing yet to be determined.      Leroy Meyer,    [facility]  02/16/24  07:27 EST

## 2024-02-16 NOTE — PAYOR COMM NOTE
"Berenice Hughes (53 y.o. Male)          U/D FOR [id number]     CONTACT NAYA GARCIA  P# [phone]  F# [phone]         Date of Birth   1970    Social Security Number       Address   [address] [place], [place] [address]    Home Phone   [phone]    MRN   [id number]       Druze   Anglican    Marital Status                               Admission Date   2/15/24    Admission Type   Urgent    Admitting Provider   Romina Hernadez MD    Attending Provider   Leroy Meyer DO    Department, Room/Bed   [address], P380/1       Discharge Date       Discharge Disposition       Discharge Destination                                 Attending Provider: Leroy Meyer DO    Allergies: Cefdinir    Isolation: None   Infection: None   Code Status: CPR    Ht: 180 cm (70.87\")   Wt: 97.7 kg (215 lb 4.8 oz)    Admission Cmt: None   Principal Problem: SBO (small bowel obstruction) [K56.609]                   Active Insurance as of 2/15/2024       Primary Coverage       Payor Plan Insurance Group Employer/Plan Group    [employer] BLUE CROSS AN[employer]M BLUE CROSS BLUE SHIELD PPO [id number]       Payor Plan Address Payor Plan Phone Number Payor Plan Fax Number Effective Dates    [address] [phone]  12/1/2020 - None Entered    [place] [address]         Subscriber Name Subscriber Birth Date Member ID       BERENICE HUGHES 1970 [id number]                     Emergency Contacts        (Rel.) Home Phone Work Phone Mobile Phone    Shante Thacker (Spouse) -- -- [phone]                 History & Physical        Romina Hernadez MD at 02/15/24 1636          PCP: Bernard Souza MD    Chief complaint small bowel obstruction    HPI  Patient is a 53 y.o. male presents with history of small bowel obstructions x 4-5, with a history of lysis of adhesions back in 2014 and cholecystectomy in 2013 who presents to primary care doctor's office with abdominal pain " nausea and vomiting.  Patient was just at [facility] 3 weeks ago for small bowel obstruction.  He improved and was doing okay for about 2 weeks and then about 4 days ago he ate a bowl of Cheerios and started having abdominal pain with nausea vomiting.  He put himself on clears and he tried some pudding and Ensure but that caused him to vomit.  Followed up with primary care doctor and patient has seen surgery as an outpatient before and they thought that he may need lysis of adhesions again however they want to wait till patient became symptomatic.   Patient also says he has had diarrhea 7-10 times a day he normally goes 1-2 times a day but denies any fevers or chills.    PAST MEDICAL HISTORY  Past Medical History:   Diagnosis Date    Acute prostatitis 10/11/2018    10/2018; on background of chronic prostatitis     Denis's esophagus with dysplasia 10/30/2017    On EGD 10/2017; repeat EGD in 10 weeks improved --> EGD 11/2018 per Dr. Meehan --> 10/2021 with no IM in esophagus but present in antrum --> 3 years    Diverticulosis     Diverticulosis of large intestine without hemorrhage 11/02/2018    Fatty liver 06/22/2017    Noted on 6/2017 CT and again on 6/2021 MRI abdomen 'liver demonstrates minimal loss of signal on the out of phase T1-weighted images most consistent with steatosis'    GERD (gastroesophageal reflux disease)     H/O hypogonadism     Hemochromatosis     heterozygous for C282Y mutation hx elevated LFT's Ferritin elevated at dx    History of cholelithiasis     History of chronic prostatitis     2010 tx'd by Dr Cooper    History of Clostridium difficile colitis     Hospitalized June 2013; with SBO    History of small bowel obstruction     related to adhesions from gallbladder surgery adhesions; 2013; 2015; 2016    IFG (impaired fasting glucose) 06/09/2017    Normal A1C    Metabolic syndrome 09/03/2021    IFG, truncal obesity, low HDL, HTN, and fatty liver meets criteria for metabolic syndrome    Overweight      Peptic ulcer disease 11/17/2017    Prehypertension     Rosacea 06/02/2016    Squamous cell carcinoma of skin     2012 sees derm q 6 months (Dr. Mitchell)    Upper back pain, chronic 07/23/2019    Ventral hernia without obstruction or gangrene 06/22/2017    Tiny, on CT 6/2017    Vitamin D deficiency        PAST SURGICAL HISTORY  Past Surgical History:   Procedure Laterality Date    CHOLECYSTECTOMY  2013    ENDOSCOPY  2018    ENDOSCOPY N/A 10/29/2021    Procedure: ESOPHAGOGASTRODUODENOSCOPY WITH BIOPSY;  Surgeon: Steven Meehan MD;  Location: [facility] MAIN OR;  Service: Gastroenterology;  Laterality: N/A;    ENDOSCOPY AND COLONOSCOPY N/A 2017    LYSIS OF ABDOMINAL ADHESIONS  07/10/2014    after SBO    SINUS SURGERY  2007    persistent congestion ?? turbinate reduction    TONSILLECTOMY      1978       FAMILY HISTORY  Family History   Problem Relation Age of Onset    Hypothyroidism Mother     Rheum arthritis Maternal Aunt     Breast cancer Maternal Aunt     Breast cancer Maternal Grandmother     Glaucoma Paternal Grandmother     Migraines Daughter     Seizures Daughter         Epilepsy    Migraines Son     Colon cancer Neg Hx     Colon polyps Neg Hx     Malig Hyperthermia Neg Hx     Crohn's disease Neg Hx     Irritable bowel syndrome Neg Hx     Ulcerative colitis Neg Hx        SOCIAL HISTORY  Social History     Tobacco Use    Smoking status: Never    Smokeless tobacco: Never   Vaping Use    Vaping Use: Never used   Substance Use Topics    Alcohol use: Yes     Alcohol/week: 5.0 standard drinks of alcohol     Types: 5 Cans of beer per week     Comment: 2-5 drinks/ week    Drug use: No       MEDICATIONS:  Medications Prior to Admission   Medication Sig Dispense Refill Last Dose    omeprazole (priLOSEC) 40 MG capsule TAKE ONE CAPSULE BY MOUTH DAILY 90 capsule 3 2/14/2024    pitavastatin calcium (LIVALO) 2 MG tablet tablet Take 1 tablet by mouth Every Night. 90 tablet 2 Past Week    psyllium (METAMUCIL) 58.6 % powder Take   by mouth Daily.  12 Past Week    telmisartan (MICARDIS) 80 MG tablet TAKE ONE TABLET BY MOUTH DAILY 90 tablet 2 Past Week    cholecalciferol (VITAMIN D3) 1000 units tablet Take 1 tablet by mouth Daily. 30 tablet 5 Unknown    metroNIDAZOLE (Metrogel) 1 % gel Apply  topically to the appropriate area as directed Daily. 60 g 2     ondansetron (ZOFRAN) 4 MG tablet Take 1 tablet by mouth Every 8 (Eight) Hours As Needed for Nausea or Vomiting for up to 15 days. 20 tablet 0 Unknown    riFAXIMin (Xifaxan) 550 MG tablet Take 1 tablet by mouth 3 (Three) Times a Day for 42 days. 42 tablet 2 Unknown       Allergies:  Cefdinir    Review of Systems:  fatigue  diarrhea, nausea, vomiting, pain    Negative for following (except as per HPI):  Constitution: chills, fevers,   Eyes: change of vision, loss of vision and discharge  ENT: ear drainage, ear ringing and facial trauma  Respiratory: cough, pleuritic pain, shortness of air  Cardiovascular: chest pressure, pain, lower extremity edema, palpitations  Gastrointestinal: constipation,   Integument: rash and wound  Hematologic / Lymphatic: excessive bleeding and easy bruising  Musculoskeletal: joint pain, joint stiffness, joint swelling and muscle pain  Neurological: headaches, numbness, seizures and tremors  Behavioral / Psych: anxiety, depression and hallucinations         Vital Signs  Temp:  [97.5 °F (36.4 °C)] 97.5 °F (36.4 °C)  Heart Rate:  [74] 74  Resp:  [14] 14  BP: (97)/(65) 97/65     There is no height or weight on file to calculate BMI.    Physical Exam:  General Appearance:    Alert, cooperative, in no acute distress but uncomfortable appearing   Head:    Normocephalic, without obvious abnormality, atraumatic   Eyes:         conjunctivae and sclerae normal, no icterus, PERRLA   ENT:    Ears grossly intact, oral mucosa moist,   Neck:   No adenopathy, supple, trachea midline,        Lungs:     Clear to auscultation,respirations regular, even and                   unlabored     Heart:    Regular rhythm and normal rate,  no murmur, normal S1 and S2,   Abdomen:   Very few bowel sounds, no masses,  soft mildly tender to palpation diffusely mildly distended,    Extremities:   Moves all extremities well, no cyanosis, no edema,             Pulses:   Pulses palpable and equal bilaterally   Skin:   No bleeding, rash, bruising    Neurologic:    Psych:   Cranial nerves 2 - 12 grossly intact, sensation grossly intact,     Moves all extremities well, equal bilateral strength    Alert and Oriented x 3, Normal Affect    I washed/sanitized my hands before entering the room and immediately upon leaving the room.    LABS:  Admission on 02/15/2024   Component Date Value Ref Range Status    WBC 02/15/2024 7.28  3.40 - 10.80 10*3/mm3 Final    RBC 02/15/2024 5.60  4.14 - 5.80 10*6/mm3 Final    Hemoglobin 02/15/2024 16.3  13.0 - 17.7 g/dL Final    Hematocrit 02/15/2024 48.4  37.5 - 51.0 % Final    MCV 02/15/2024 86.4  79.0 - 97.0 fL Final    MCH 02/15/2024 29.1  26.6 - 33.0 pg Final    MCHC 02/15/2024 33.7  31.5 - 35.7 g/dL Final    RDW 02/15/2024 13.5  12.3 - 15.4 % Final    RDW-SD 02/15/2024 41.9  37.0 - 54.0 fl Final    MPV 02/15/2024 10.0  6.0 - 12.0 fL Final    Platelets 02/15/2024 204  140 - 450 10*3/mm3 Final    Neutrophil % 02/15/2024 67.9  42.7 - 76.0 % Final    Lymphocyte % 02/15/2024 22.3  19.6 - 45.3 % Final    Monocyte % 02/15/2024 8.8  5.0 - 12.0 % Final    Eosinophil % 02/15/2024 0.4  0.3 - 6.2 % Final    Basophil % 02/15/2024 0.3  0.0 - 1.5 % Final    Immature Grans % 02/15/2024 0.3  0.0 - 0.5 % Final    Neutrophils, Absolute 02/15/2024 4.95  1.70 - 7.00 10*3/mm3 Final    Lymphocytes, Absolute 02/15/2024 1.62  0.70 - 3.10 10*3/mm3 Final    Monocytes, Absolute 02/15/2024 0.64  0.10 - 0.90 10*3/mm3 Final    Eosinophils, Absolute 02/15/2024 0.03  0.00 - 0.40 10*3/mm3 Final    Basophils, Absolute 02/15/2024 0.02  0.00 - 0.20 10*3/mm3 Final    Immature Grans, Absolute 02/15/2024 0.02  0.00 - 0.05  10*3/mm3 Final    nRBC 02/15/2024 0.0  0.0 - 0.2 /100 WBC Final    Glucose 02/15/2024 104 (H)  65 - 99 mg/dL Final    BUN 02/15/2024 46 (H)  6 - 20 mg/dL Final    Creatinine 02/15/2024 1.81 (H)  0.76 - 1.27 mg/dL Final    Sodium 02/15/2024 135 (L)  136 - 145 mmol/L Final    Potassium 02/15/2024 4.1  3.5 - 5.2 mmol/L Final    Chloride 02/15/2024 98  98 - 107 mmol/L Final    CO2 02/15/2024 20.0 (L)  22.0 - 29.0 mmol/L Final    Calcium 02/15/2024 10.1  8.6 - 10.5 mg/dL Final    Total Protein 02/15/2024 7.7  6.0 - 8.5 g/dL Final    Albumin 02/15/2024 5.2  3.5 - 5.2 g/dL Final    ALT (SGPT) 02/15/2024 51 (H)  1 - 41 U/L Final    AST (SGOT) 02/15/2024 27  1 - 40 U/L Final    Alkaline Phosphatase 02/15/2024 77  39 - 117 U/L Final    Total Bilirubin 02/15/2024 2.0 (H)  0.0 - 1.2 mg/dL Final    Globulin 02/15/2024 2.5  gm/dL Final    A/G Ratio 02/15/2024 2.1  g/dL Final    BUN/Creatinine Ratio 02/15/2024 25.4 (H)  7.0 - 25.0 Final    Anion Gap 02/15/2024 17.0 (H)  5.0 - 15.0 mmol/L Final    eGFR 02/15/2024 43.6 (L)  >60.0 mL/min/1.73 Final    Magnesium 02/15/2024 2.5  1.6 - 2.6 mg/dL Final    Protime 02/15/2024 14.3 (H)  11.7 - 14.2 Seconds Final    INR 02/15/2024 1.10  0.90 - 1.10 Final    Phosphorus 02/15/2024 4.9 (H)  2.5 - 4.5 mg/dL Final    Lactate 02/15/2024 1.6  0.5 - 2.0 mmol/L Final    QT Interval 02/15/2024 394  ms Final    QTC Interval 02/15/2024 422  ms Final         DIAGNOSTICS:  XR Abdomen KUB    Addendum Date: 2/15/2024    COMPARISON made to previous outside CT of the abdomen dated 1/24/2024 and KUB 2/14/2024  FINDINGS: The amount of gas as well as degree of distention of the small bowel is similar to or slightly greater compared to the previous KUB from 1/25/2024. The colon gas pattern is normal and similar to the prior examination.  This report was finalized on 2/15/2024 10:21 AM by Dr. Donn Ocasio M.D on Workstation: [id number]            Results Review:   I reviewed the patient's new clinical  results.  Discussed with ER physician  Old records reviewed / Medical Decision Making High Complexity  I personally viewed and interpreted the patient's EKG/Telemetry data- sinus rhythm    Creatinine at [facility] on January 24 was 0.95    ASSESSMENT AND PLAN    SBO (small bowel obstruction)    Hemochromatosis    IFG (impaired fasting glucose)    Obesity (BMI 30-39.9)    Fatty liver    Denis's esophagus with dysplasia    Peptic ulcer disease    Essential hypertension    Metabolic syndrome    History of abdominal surgery (VINOD mcguire 2014)    Abdominal adhesions    History of small bowel obstruction 2013; 2015; 2016, 2023    Nausea vomiting and abdominal pain with possibly slightly worse gas distention of small bowel on KUB   History of abdominal surgery (VINOD mcguire 2014)   History of small bowel obstruction 2013; 2015; 2016, 2023  -N.p.o.  -No NG tube at this time  -Consult surgery  -Patient is out of window as an outpatient and they were going to try Xifaxan but patient has not even filled yet.     Pelon with anion gap metabolic acidosis  -Patient has not had this in the past.  Does not appear to have been on NSAIDs.  -Not on nephrotoxic drugs at home  -Has had some decreased oral intake but still was doing fluids  -Will get a urinalysis and check for a FENA  -Will get a renal ultrasound.-Given the small bowel obstruction may be had been hydronephrosis  -Lactate slightly elevated.  May be starvation ketosis.  Will IV fluid resuscitate with 2 L normal saline and then do D5 half-normal        Denis's esophagus with dysplasia /   Peptic ulcer disease  -Continue PPI twice daily      Essential hypertension  -Stable, continue medical management for now     H/o Hemochromatosis  - Heterozygous for C282Y mutation; hx elevated LFT's; Ferritin elevated at dx; goal ferritin < 50           Chronic medical conditions being monitored- stable, continue medical management  -  Metabolic syndrome    +DVT proph:    scd  + CODE STATUS:  Full       I discussed the patient's findings and my recommendations with the patient and/or family.  Please reference all orders placed.    Romina Hernadez MD  02/15/24  16:36 EST      This document is intended for medical expert use only. Reading of this document by patients and/or patient's family without participating in medical staff guidance may result in misinterpretation and unintended morbidity. Any interpretation of such data is the responsibility of the patient and/or family member responsible for the patient in concert with their primary or specialist providers, and NOT to be left for sources of online searches such as [employer], [employer] or similar queries. Relying on these approaches to knowledge may result in misinterpretation, misguided goals of care and even death should patients or family members try recommendations outside of the realm of professional medical care in a supervised way.    Dictated utilizing Voice dictation:  Parts of this note may be an electronic transcription/translation of spoke language to printed text using Dragon dictation system.    Electronically signed by Romina Hernadez MD at 24 0001          Physician Progress Notes (last 48 hours)        Leroy Meyer DO at 24 0727              Name: Matias Hughes ADMIT: 2/15/2024   : 1970  PCP: Bernard Souza MD    MRN: [id number] LOS: 1 days   AGE/SEX: 53 y.o. male  ROOM: [facility]/     Subjective   Subjective   Patient seen and examined this morning.  Hospital day 1.  He is awake and alert, resting comfortably in bed.  He states that he did have bowel movements overnight, abdominal pain improved at present.      Objective   Objective   Vital Signs  Temp:  [97.5 °F (36.4 °C)-98.5 °F (36.9 °C)] 98.3 °F (36.8 °C)  Heart Rate:  [74-87] 87  Resp:  [14-17] 17  BP: ()/(62-82) 142/82  SpO2:  [92 %-98 %] 92 %  on   ;   Device (Oxygen Therapy): room air  There is no height or weight on file to calculate BMI.  Physical  "Exam  Vitals and nursing note reviewed.   Constitutional:       General: He is awake. He is not in acute distress.     Appearance: He is overweight.   Cardiovascular:      Rate and Rhythm: Normal rate and regular rhythm.      Pulses: Normal pulses.      Heart sounds: Normal heart sounds.   Pulmonary:      Effort: Pulmonary effort is normal. No respiratory distress.      Breath sounds: Normal breath sounds.   Abdominal:      Palpations: Abdomen is soft.      Tenderness: There is no abdominal tenderness.   Skin:     General: Skin is warm and dry.   Neurological:      Mental Status: He is alert.   Psychiatric:         Behavior: Behavior is cooperative.       Results Review     I reviewed the patient's new clinical results.  Results from last 7 days   Lab Units 02/16/24  0608 02/15/24  1721   WBC 10*3/mm3 6.77 7.28   HEMOGLOBIN g/dL 13.9 16.3   PLATELETS 10*3/mm3 158 204     Results from last 7 days   Lab Units 02/16/24  0608 02/15/24  1721   SODIUM mmol/L 135* 135*   POTASSIUM mmol/L 3.8 4.1   CHLORIDE mmol/L 106 98   CO2 mmol/L 13.8* 20.0*   BUN mg/dL 34* 46*   CREATININE mg/dL 1.06 1.81*   GLUCOSE mg/dL 153* 104*   EGFR mL/min/1.73 82.9 43.6*     Results from last 7 days   Lab Units 02/16/24  0608 02/15/24  1721   ALBUMIN g/dL 3.8 5.2   BILIRUBIN mg/dL 1.3* 2.0*   ALK PHOS U/L 64 77   AST (SGOT) U/L 24 27   ALT (SGPT) U/L 42* 51*     Results from last 7 days   Lab Units 02/16/24  0608 02/15/24  1721   CALCIUM mg/dL 7.9* 10.1   ALBUMIN g/dL 3.8 5.2   MAGNESIUM mg/dL  --  2.5   PHOSPHORUS mg/dL  --  4.9*     Results from last 7 days   Lab Units 02/15/24  1721   LACTATE mmol/L 1.6     No results found for: \"HGBA1C\", \"POCGLU\"    US Renal Bilateral    Result Date: 2/16/2024  Electronically signed by Drake Edward M.D. on 02-16-24 at 0142    XR Abdomen KUB    Addendum Date: 2/15/2024    COMPARISON made to previous outside CT of the abdomen dated 1/24/2024 and KUB 2/14/2024  FINDINGS: The amount of gas as well as degree of " distention of the small bowel is similar to or slightly greater compared to the previous KUB from 1/25/2024. The colon gas pattern is normal and similar to the prior examination.  This report was finalized on 2/15/2024 10:21 AM by Dr. Donn Ocasio M.D on Workstation: [id number]       I have personally reviewed all medications:  Scheduled Medications  pantoprazole, 40 mg, Intravenous, Q12H  sodium chloride, 10 mL, Intravenous, Q12H    Infusions  dextrose 5 % and sodium chloride 0.45 %, 125 mL/hr, Last Rate: 125 mL/hr (02/16/24 0355)    Diet  NPO Diet NPO Type: Sips with Meds    I have personally reviewed:  [x]  Laboratory   [x]  Microbiology   [x]  Radiology   [x]  EKG/Telemetry  [x]  Cardiology/Vascular   []  Pathology    []  Records      Assessment/Plan     Active Hospital Problems    Diagnosis  POA    **SBO (small bowel obstruction) [K56.609]  Yes    History of abdominal surgery (maynor,VINOD 2014) [Z98.890]  Not Applicable    Abdominal adhesions [K66.0]  Yes    History of small bowel obstruction 2013; 2015; 2016, 2023 [Z87.19]  Not Applicable    Metabolic syndrome [E88.810]  Yes    Essential hypertension [I10]  Yes    Peptic ulcer disease [K27.9]  Yes    Denis's esophagus with dysplasia [K22.719]  Yes    Fatty liver [K76.0]  Yes    Obesity (BMI 30-39.9) [E66.9]  Yes    IFG (impaired fasting glucose) [R73.01]  Yes    Hemochromatosis [E83.119]  Yes      Resolved Hospital Problems   No resolved problems to display.       53 y.o. male admitted with SBO (small bowel obstruction).    Small bowel obstruction  - Noted on imaging following arrival.  - NPO, bowel rest, IVF, anti-emetics PRN  - General Surgery consulted and following. Will continue to follow their plans/recommendations. Greatly appreciate their help.    Acute kidney injury  Dehydration  - Noted on arrival, likely pre-renal, creatinine improving with IVF. Dehydration supported by DIYA and elevated BUN/Cr ratio. Renal US negative for obstruction or acute  findings. Continue IVF as prescribed, monitor with daily labs.    Hepatic steatosis  Transaminitis  - LFT elevated on most recent labs, improved from prior. No indications to warrant acute intervention for now, however, will continue to closely monitor. If no improvement and/or worsens, can consider further workup as indicated.  - Order repeat CMP in AM for reassessment.    Hypertension  - Blood pressure appears stable and acceptable acutely at this time. Holding home ARB due to dehydration/DIYA noted on arrival, can resume when appropriate.   - Trend BP to guide ongoing management decisions.    Metabolic acidosis  - Noted on arrival, with elevated anion gap, UA with ketones, could be result of starvation ketosis from decreased intake. Also, patient has been on IVF with NS which could be playing a role.  - Order lactic acid, Tylenol level, salicylate level, change IVF to LR and closely monitor to guide management decisions.    Peptic ulcer disease/Denis's esophagus  - Continue PPI as prescribed.     Obesity  - BMI 30.14 kg/m2. Complicating all medical problems.        SCDs for DVT prophylaxis.  Full code.  Discussed with patient and nursing staff.  Anticipate discharge  TBD  timing yet to be determined.      Leroy Meyer DO  [facility]  02/16/24  07:27 EST        Electronically signed by Leroy Meyer DO at 02/16/24 0913          Consult Notes (last 48 hours)        Isabel Rubio PA-C at 02/16/24 0911        Consult Orders    1. Inpatient Gastroenterology Consult [[id number]] ordered by Romina Hernadez MD at 02/16/24 0002              Attestation signed by Kenton Paula MD at 02/16/24 1506    I have reviewed this documentation and agree.                  [facility]  Initial Inpatient Consult Note    Referring Provider: MD Satya    Reason for Consultation: SBO    Subjective     History of present illness:    53 y.o. male with a past medical history  of GERD, Denis's esophagus, hemochromatosis, recurrent small bowel obstructions with multiple prior abdominal surgeries which include cholecystectomy, exploratory laparotomy with adhesiolysis presented to the emergency room on 2/15/2023 for concern for small bowel obstruction.  Had multiple hospital admissions secondary to small bowel obstructions over the last few years.  Patient currently denies any abdominal pain.  He did report 1 episode of vomiting last night and felt it was secondary to IV fluids.  Patient reports that his bowels continue to move but primarily loose in nature.  No blood in the stool.  And is currently n.p.o. for small bowel follow-through with Gastrografin.    His last EGD was in 2021 with esophageal mucosal changes consistent with short segment Denis's esophagus, otherwise normal-appearing duodenum.  His last colonoscopy was in 2017 with evidence of diverticulosis otherwise normal.  Patient denies family history of IBD or colon cancer.    Past Medical History:  Past Medical History:   Diagnosis Date    Abdominal adhesions 02/15/2024    Acute prostatitis 10/11/2018    10/2018; on background of chronic prostatitis     Denis's esophagus with dysplasia 10/30/2017    On EGD 10/2017; repeat EGD in 10 weeks improved --> EGD 11/2018 per Dr. Meehan --> 10/2021 with no IM in esophagus but present in antrum --> 3 years    Diverticulosis     Diverticulosis of large intestine without hemorrhage 11/02/2018    Fatty liver 06/22/2017    Noted on 6/2017 CT and again on 6/2021 MRI abdomen 'liver demonstrates minimal loss of signal on the out of phase T1-weighted images most consistent with steatosis'    GERD (gastroesophageal reflux disease)     H/O hypogonadism     Hemochromatosis     heterozygous for C282Y mutation hx elevated LFT's Ferritin elevated at dx    History of abdominal surgery (maynor,VINOD 2014) 02/15/2024    History of cholelithiasis     History of chronic prostatitis     2010 tx'd by   [facility]    History of Clostridium difficile colitis     Hospitalized June 2013; with SBO    History of small bowel obstruction     related to adhesions from gallbladder surgery adhesions; 2013; 2015; 2016    History of small bowel obstruction 02/15/2024    related to adhesions from gallbladder surgery adhesions; 2013; 2015; 2016    IFG (impaired fasting glucose) 06/09/2017    Normal A1C    Metabolic syndrome 09/03/2021    IFG, truncal obesity, low HDL, HTN, and fatty liver meets criteria for metabolic syndrome    Overweight     Peptic ulcer disease 11/17/2017    Prehypertension     Rosacea 06/02/2016    Squamous cell carcinoma of skin     2012 sees derm q 6 months (Dr. Mitchell)    Upper back pain, chronic 07/23/2019    Ventral hernia without obstruction or gangrene 06/22/2017    Tiny, on CT 6/2017    Vitamin D deficiency      Past Surgical History:  Past Surgical History:   Procedure Laterality Date    CHOLECYSTECTOMY  2013    ENDOSCOPY  2018    ENDOSCOPY N/A 10/29/2021    Procedure: ESOPHAGOGASTRODUODENOSCOPY WITH BIOPSY;  Surgeon: Steven Meehan MD;  [place] [facility] [place];  Service: Gastroenterology;  Laterality: N/A;    ENDOSCOPY AND COLONOSCOPY N/A 2017    LYSIS OF ABDOMINAL ADHESIONS  07/10/2014    after SBO    SINUS SURGERY  2007    persistent congestion ?? turbinate reduction    TONSILLECTOMY      1978      Social History:   Social History     Tobacco Use    Smoking status: Never    Smokeless tobacco: Never   Substance Use Topics    Alcohol use: Yes     Alcohol/week: 5.0 standard drinks of alcohol     Types: 5 Cans of beer per week     Comment: 2-5 drinks/ week      Family History:  Family History   Problem Relation Age of Onset    Hypothyroidism Mother     Rheum arthritis Maternal Aunt     Breast cancer Maternal Aunt     Breast cancer Maternal Grandmother     Glaucoma Paternal Grandmother     Migraines Daughter     Seizures Daughter         Epilepsy    Migraines Son     Colon cancer Neg Hx     Colon  polyps Neg Hx     Malig Hyperthermia Neg Hx     Crohn's disease Neg Hx     Irritable bowel syndrome Neg Hx     Ulcerative colitis Neg Hx        Home Meds:  Medications Prior to Admission   Medication Sig Dispense Refill Last Dose    omeprazole (priLOSEC) 40 MG capsule TAKE ONE CAPSULE BY MOUTH DAILY 90 capsule 3 2/14/2024    pitavastatin calcium (LIVALO) 2 MG tablet tablet Take 1 tablet by mouth Every Night. 90 tablet 2 Past Week    psyllium (METAMUCIL) 58.6 % powder Take  by mouth Daily.  12 Past Week    telmisartan (MICARDIS) 80 MG tablet TAKE ONE TABLET BY MOUTH DAILY 90 tablet 2 Past Week    cholecalciferol (VITAMIN D3) 1000 units tablet Take 1 tablet by mouth Daily. 30 tablet 5 Unknown    metroNIDAZOLE (Metrogel) 1 % gel Apply  topically to the appropriate area as directed Daily. 60 g 2     ondansetron (ZOFRAN) 4 MG tablet Take 1 tablet by mouth Every 8 (Eight) Hours As Needed for Nausea or Vomiting for up to 15 days. 20 tablet 0 Unknown    riFAXIMin (Xifaxan) 550 MG tablet Take 1 tablet by mouth 3 (Three) Times a Day for 42 days. 42 tablet 2 Unknown     Current Meds:   pantoprazole, 40 mg, Intravenous, Q12H  sodium chloride, 10 mL, Intravenous, Q12H      Allergies:  Allergies   Allergen Reactions    Cefdinir Hives       Objective     Vital Signs  Temp:  [97.5 °F (36.4 °C)-98.5 °F (36.9 °C)] 98.1 °F (36.7 °C)  Heart Rate:  [74-87] 78  Resp:  [14-17] 16  BP: ()/(62-82) 110/77  Physical Exam:  General Appearance:     Alert, cooperative, in no acute distress   Abdomen:     Normal bowel sounds, no masses, no organomegaly, soft     nontender, nondistended, no guarding, no rebound                 tenderness   Rectal:     Deferred       Results Review:   I reviewed the patient's new clinical results.  I reviewed the patient's new imaging results and agree with the interpretation.    Results from last 7 days   Lab Units 02/16/24  0608 02/15/24  1721   WBC 10*3/mm3 6.77 7.28   HEMOGLOBIN g/dL 13.9 16.3    HEMATOCRIT % 40.6 48.4   PLATELETS 10*3/mm3 158 204     Results from last 7 days   Lab Units 02/16/24  0608 02/15/24  1721   SODIUM mmol/L 135* 135*   POTASSIUM mmol/L 3.8 4.1   CHLORIDE mmol/L 106 98   CO2 mmol/L 13.8* 20.0*   BUN mg/dL 34* 46*   CREATININE mg/dL 1.06 1.81*   CALCIUM mg/dL 7.9* 10.1   BILIRUBIN mg/dL 1.3* 2.0*   ALK PHOS U/L 64 77   ALT (SGPT) U/L 42* 51*   AST (SGOT) U/L 24 27   GLUCOSE mg/dL 153* 104*     Results from last 7 days   Lab Units 02/15/24  1721   INR  1.10     Lab Results   Lab Value Date/Time    LIPASE 10 01/24/2024 1015    LIPASE 12 11/13/2022 1044    LIPASE 37 04/13/2021 1851       Radiology:  US Renal Bilateral   Final Result         Electronically signed by Drake Edward M.D. on 02-16-24 at 0142      FL Small Bowel Follow Through Single-Contrast    (Results Pending)       Assessment & Plan   Assessment:   Possible small bowel obstruction  Recurrent small bowel obstructions with history of prior ex lap with adhesiolysis   GERD  Denis's esophagus    Plan:   Currently completing small bowel follow-through with Gastrografin  Patient passing gas and stool without any significant abdominal pain.  Depending on findings of small bowel follow-through we will plan to advance diet as tolerated.  General surgery following with no indication for surgical intervention at this time.  Continue pantoprazole with patient's history of GERD and Denis's esophagus    I discussed the patients findings and my recommendations with patient and family.    Dictated utilizing Dragon dictation.         Isabel Rubio PA-C   [facility]  [address] [place] [address]  Office: [phone]       Electronically signed by Kenton Paula MD at 02/16/24 9452       Nino Mueller MD at 02/15/24 6538        Consult Orders    1. Inpatient General Surgery Consult [[id number]] ordered by Romina Hernadez MD at 02/15/24 1636                 Cc: Concern  for small bowel obstruction    History of presenting illness:   This is a nice 53-year-old gentleman with a history of multiple admissions over the last 10 years or so for small bowel obstruction.  He describes undergoing what sounds ago rather unremarkable laparoscopic cholecystectomy in 2013 and subsequently an exploratory laparotomy and adhesiolysis in 2014.  From what the patient describes, it they may have found a band adhesion, but he does have a substantial midline scar.  Since that time over the course of the last 10 years or so he has had multiple admissions for bowel obstruction.  These have all resolved with conservative management.  He was admitted at [facility] just about 3 weeks ago with a small bowel obstruction and discharged home after a couple of days, seems to do well.  He did well until about 4 5 days ago when he began to have some increased pain.  It was not as severe as the episode that hospitalized him.  He states that he was feeling a bit better but saw his gastroenterologist who ordered a plain film of the abdomen which demonstrated some dilated small bowel loops and recommended admission to the hospital.  The patient had a bowel movement today and has passed gas but says he does not feel quite like normal.  He does say that his abdomen feels a bit distended.  He has no nausea currently.    Past Medical History: Hemochromatosis, hypertension, gastroesophageal reflux disease    Past Surgical History: Laparoscopic cholecystectomy 2013, open laparotomy with lysis of adhesions 2014, multiple endoscopies    Medications: Omeprazole, psyllium, losartan, Xifaxan    Allergies: Cefdinir    Social History: Non-smoker    Family History: Negative for colon or rectal cancer    Review of Systems:  Constitutional: Denies fever, chills, change in weight  Neck: no swollen glands or dysphagia or odynophagia  Respiratory: negative for SOB, cough, hemoptysis or wheezing  Cardiovascular: negative for chest  pain, palpitations or peripheral edema  Gastrointestinal: Positive for abdominal pain, denies nausea or vomiting currently      Physical Exam:  BMI: 30.1  Temperature 98.2 heart rate 75 blood pressure 110/67  General: alert and oriented, appropriate, no acute distress  Eyes: No scleral icterus, extraocular movements are intact  Neck: Supple without lymphadenopathy or thyromegaly, trachea is in the midline  Respiratory: There is good bilateral chest expansion, no use of accessory muscles is noted  Cardiovascular: No jugular venous distention or peripheral edema is seen  Gastrointestinal: Soft, subjectively distended per patient, not obviously distended by my evaluation.  No obvious tenderness.  No hernia felt.  No peritonitis.    Laboratory data: White blood cell count 7.2 with no left shift.  Hemoglobin 16.3.  Lactate 1.6.  Chemistries reviewed.  Creatinine elevated at 1.8 (baseline seems to be around 0.9),    Imaging data: CT of the abdomen and pelvis done at [facility] around 3 weeks ago reviewed and is consistent with small bowel obstruction.  Moderately dilated proximal small bowel loops and some distally decompressed loops.  Colon is decompressed.  Abdominal films done yesterday reviewed by me.  These demonstrate air distended small bowel loops.  There does appear to be some gas in the colon.      Assessment and plan:   -Possible partial small bowel obstruction.  Clinically the patient is passing gas and had a bowel movement and his abdominal exam is reassuring.  However the abdominal film is abnormal appearing.  No indication for surgical intervention at this time.  I have recommended a Gastrografin challenge.  If contrast passes to the colon and he can likely have his diet advanced.  -I do not see an indication for surgical intervention, but if the patient does not improve laparoscopy may be worthwhile given the fact that this is his second bowel obstruction in less than a month.      Nino Mueller,  MD, FACS  General, Minimally Invasive and Endoscopic Surgery  [facility]    [address], [address]  [place], [place], [address]  P: [phone]  F: [phone]            Electronically signed by Nino Mueller MD at 02/15/24 2014

## 2024-02-16 NOTE — CONSULTS
Cc: Concern for small bowel obstruction    History of presenting illness:   This is a nice 53-year-old gentleman with a history of multiple admissions over the last 10 years or so for small bowel obstruction.  He describes undergoing what sounds ago rather unremarkable laparoscopic cholecystectomy in 2013 and subsequently an exploratory laparotomy and adhesiolysis in 2014.  From what the patient describes, it they may have found a band adhesion, but he does have a substantial midline scar.  Since that time over the course of the last 10 years or so he has had multiple admissions for bowel obstruction.  These have all resolved with conservative management.  He was admitted at [facility] just about 3 weeks ago with a small bowel obstruction and discharged home after a couple of days, seems to do well.  He did well until about 4 5 days ago when he began to have some increased pain.  It was not as severe as the episode that hospitalized him.  He states that he was feeling a bit better but saw his gastroenterologist who ordered a plain film of the abdomen which demonstrated some dilated small bowel loops and recommended admission to the hospital.  The patient had a bowel movement today and has passed gas but says he does not feel quite like normal.  He does say that his abdomen feels a bit distended.  He has no nausea currently.    Past Medical History: Hemochromatosis, hypertension, gastroesophageal reflux disease    Past Surgical History: Laparoscopic cholecystectomy 2013, open laparotomy with lysis of adhesions 2014, multiple endoscopies    Medications: Omeprazole, psyllium, losartan, Xifaxan    Allergies: Cefdinir    Social History: Non-smoker    Family History: Negative for colon or rectal cancer    Review of Systems:  Constitutional: Denies fever, chills, change in weight  Neck: no swollen glands or dysphagia or odynophagia  Respiratory: negative for SOB, cough, hemoptysis or wheezing  Cardiovascular: negative  for chest pain, palpitations or peripheral edema  Gastrointestinal: Positive for abdominal pain, denies nausea or vomiting currently      Physical Exam:  BMI: 30.1  Temperature 98.2 heart rate 75 blood pressure 110/67  General: alert and oriented, appropriate, no acute distress  Eyes: No scleral icterus, extraocular movements are intact  Neck: Supple without lymphadenopathy or thyromegaly, trachea is in the midline  Respiratory: There is good bilateral chest expansion, no use of accessory muscles is noted  Cardiovascular: No jugular venous distention or peripheral edema is seen  Gastrointestinal: Soft, subjectively distended per patient, not obviously distended by my evaluation.  No obvious tenderness.  No hernia felt.  No peritonitis.    Laboratory data: White blood cell count 7.2 with no left shift.  Hemoglobin 16.3.  Lactate 1.6.  Chemistries reviewed.  Creatinine elevated at 1.8 (baseline seems to be around 0.9),    Imaging data: CT of the abdomen and pelvis done at [facility] around 3 weeks ago reviewed and is consistent with small bowel obstruction.  Moderately dilated proximal small bowel loops and some distally decompressed loops.  Colon is decompressed.  Abdominal films done yesterday reviewed by me.  These demonstrate air distended small bowel loops.  There does appear to be some gas in the colon.      Assessment and plan:   -Possible partial small bowel obstruction.  Clinically the patient is passing gas and had a bowel movement and his abdominal exam is reassuring.  However the abdominal film is abnormal appearing.  No indication for surgical intervention at this time.  I have recommended a Gastrografin challenge.  If contrast passes to the colon and he can likely have his diet advanced.  -I do not see an indication for surgical intervention, but if the patient does not improve laparoscopy may be worthwhile given the fact that this is his second bowel obstruction in less than a month.      Nino VIDAL  MD Ervin, FACS  General, Minimally Invasive and Endoscopic Surgery  [facility]    [address], [address]  [place], [place], [address]  P: [phone]  F: [phone]

## 2024-02-16 NOTE — CONSULTS
[facility]  Initial Inpatient Consult Note    Referring Provider: MD Satya    Reason for Consultation: SBO    Subjective     History of present illness:    53 y.o. male with a past medical history of GERD, Denis's esophagus, hemochromatosis, recurrent small bowel obstructions with multiple prior abdominal surgeries which include cholecystectomy, exploratory laparotomy with adhesiolysis presented to the emergency room on 2/15/2023 for concern for small bowel obstruction.  Had multiple hospital admissions secondary to small bowel obstructions over the last few years.  Patient currently denies any abdominal pain.  He did report 1 episode of vomiting last night and felt it was secondary to IV fluids.  Patient reports that his bowels continue to move but primarily loose in nature.  No blood in the stool.  And is currently n.p.o. for small bowel follow-through with Gastrografin.    His last EGD was in 2021 with esophageal mucosal changes consistent with short segment Denis's esophagus, otherwise normal-appearing duodenum.  His last colonoscopy was in 2017 with evidence of diverticulosis otherwise normal.  Patient denies family history of IBD or colon cancer.    Past Medical History:  Past Medical History:   Diagnosis Date    Abdominal adhesions 02/15/2024    Acute prostatitis 10/11/2018    10/2018; on background of chronic prostatitis     Denis's esophagus with dysplasia 10/30/2017    On EGD 10/2017; repeat EGD in 10 weeks improved --> EGD 11/2018 per Dr. Meehan --> 10/2021 with no IM in esophagus but present in antrum --> 3 years    Diverticulosis     Diverticulosis of large intestine without hemorrhage 11/02/2018    Fatty liver 06/22/2017    Noted on 6/2017 CT and again on 6/2021 MRI abdomen 'liver demonstrates minimal loss of signal on the out of phase T1-weighted images most consistent with steatosis'    GERD (gastroesophageal reflux disease)     H/O hypogonadism     Hemochromatosis      heterozygous for C282Y mutation hx elevated LFT's Ferritin elevated at dx    History of abdominal surgery (maynor,VINOD 2014) 02/15/2024    History of cholelithiasis     History of chronic prostatitis     2010 tx'd by Dr Cooper    History of Clostridium difficile colitis     Hospitalized June 2013; with SBO    History of small bowel obstruction     related to adhesions from gallbladder surgery adhesions; 2013; 2015; 2016    History of small bowel obstruction 02/15/2024    related to adhesions from gallbladder surgery adhesions; 2013; 2015; 2016    IFG (impaired fasting glucose) 06/09/2017    Normal A1C    Metabolic syndrome 09/03/2021    IFG, truncal obesity, low HDL, HTN, and fatty liver meets criteria for metabolic syndrome    Overweight     Peptic ulcer disease 11/17/2017    Prehypertension     Rosacea 06/02/2016    Squamous cell carcinoma of skin     2012 sees derm q 6 months (Dr. Mitchell)    Upper back pain, chronic 07/23/2019    Ventral hernia without obstruction or gangrene 06/22/2017    Tiny, on CT 6/2017    Vitamin D deficiency      Past Surgical History:  Past Surgical History:   Procedure Laterality Date    CHOLECYSTECTOMY  2013    ENDOSCOPY  2018    ENDOSCOPY N/A 10/29/2021    Procedure: ESOPHAGOGASTRODUODENOSCOPY WITH BIOPSY;  Surgeon: Steven Meehan MD;  [facility];  Service: Gastroenterology;  Laterality: N/A;    ENDOSCOPY AND COLONOSCOPY N/A 2017    LYSIS OF ABDOMINAL ADHESIONS  07/10/2014    after SBO    SINUS SURGERY  2007    persistent congestion ?? turbinate reduction    TONSILLECTOMY      1978      Social History:   Social History     Tobacco Use    Smoking status: Never    Smokeless tobacco: Never   Substance Use Topics    Alcohol use: Yes     Alcohol/week: 5.0 standard drinks of alcohol     Types: 5 Cans of beer per week     Comment: 2-5 drinks/ week      Family History:  Family History   Problem Relation Age of Onset    Hypothyroidism Mother     Rheum arthritis Maternal Aunt      Breast cancer Maternal Aunt     Breast cancer Maternal Grandmother     Glaucoma Paternal Grandmother     Migraines Daughter     Seizures Daughter         Epilepsy    Migraines Son     Colon cancer Neg Hx     Colon polyps Neg Hx     Malig Hyperthermia Neg Hx     Crohn's disease Neg Hx     Irritable bowel syndrome Neg Hx     Ulcerative colitis Neg Hx        Home Meds:  Medications Prior to Admission   Medication Sig Dispense Refill Last Dose    omeprazole (priLOSEC) 40 MG capsule TAKE ONE CAPSULE BY MOUTH DAILY 90 capsule 3 2/14/2024    pitavastatin calcium (LIVALO) 2 MG tablet tablet Take 1 tablet by mouth Every Night. 90 tablet 2 Past Week    psyllium (METAMUCIL) 58.6 % powder Take  by mouth Daily.  12 Past Week    telmisartan (MICARDIS) 80 MG tablet TAKE ONE TABLET BY MOUTH DAILY 90 tablet 2 Past Week    cholecalciferol (VITAMIN D3) 1000 units tablet Take 1 tablet by mouth Daily. 30 tablet 5 Unknown    metroNIDAZOLE (Metrogel) 1 % gel Apply  topically to the appropriate area as directed Daily. 60 g 2     ondansetron (ZOFRAN) 4 MG tablet Take 1 tablet by mouth Every 8 (Eight) Hours As Needed for Nausea or Vomiting for up to 15 days. 20 tablet 0 Unknown    riFAXIMin (Xifaxan) 550 MG tablet Take 1 tablet by mouth 3 (Three) Times a Day for 42 days. 42 tablet 2 Unknown     Current Meds:   pantoprazole, 40 mg, Intravenous, Q12H  sodium chloride, 10 mL, Intravenous, Q12H      Allergies:  Allergies   Allergen Reactions    Cefdinir Hives       Objective     Vital Signs  Temp:  [97.5 °F (36.4 °C)-98.5 °F (36.9 °C)] 98.1 °F (36.7 °C)  Heart Rate:  [74-87] 78  Resp:  [14-17] 16  BP: ()/(62-82) 110/77  Physical Exam:  General Appearance:     Alert, cooperative, in no acute distress   Abdomen:     Normal bowel sounds, no masses, no organomegaly, soft     nontender, nondistended, no guarding, no rebound                 tenderness   Rectal:     Deferred       Results Review:   I reviewed the patient's new clinical  results.  I reviewed the patient's new imaging results and agree with the interpretation.    Results from last 7 days   Lab Units 02/16/24  0608 02/15/24  1721   WBC 10*3/mm3 6.77 7.28   HEMOGLOBIN g/dL 13.9 16.3   HEMATOCRIT % 40.6 48.4   PLATELETS 10*3/mm3 158 204     Results from last 7 days   Lab Units 02/16/24  0608 02/15/24  1721   SODIUM mmol/L 135* 135*   POTASSIUM mmol/L 3.8 4.1   CHLORIDE mmol/L 106 98   CO2 mmol/L 13.8* 20.0*   BUN mg/dL 34* 46*   CREATININE mg/dL 1.06 1.81*   CALCIUM mg/dL 7.9* 10.1   BILIRUBIN mg/dL 1.3* 2.0*   ALK PHOS U/L 64 77   ALT (SGPT) U/L 42* 51*   AST (SGOT) U/L 24 27   GLUCOSE mg/dL 153* 104*     Results from last 7 days   Lab Units 02/15/24  1721   INR  1.10     Lab Results   Lab Value Date/Time    LIPASE 10 01/24/2024 1015    LIPASE 12 11/13/2022 1044    LIPASE 37 04/13/2021 1851       Radiology:  US Renal Bilateral   Final Result         Electronically signed by Drake Edward M.D. on 02-16-24 at 0142      FL Small Bowel Follow Through Single-Contrast    (Results Pending)       Assessment & Plan   Assessment:   Possible small bowel obstruction  Recurrent small bowel obstructions with history of prior ex lap with adhesiolysis   GERD  Denis's esophagus    Plan:   Currently completing small bowel follow-through with Gastrografin  Patient passing gas and stool without any significant abdominal pain.  Depending on findings of small bowel follow-through we will plan to advance diet as tolerated.  General surgery following with no indication for surgical intervention at this time.  Continue pantoprazole with patient's history of GERD and Denis's esophagus    I discussed the patients findings and my recommendations with patient and family.    Dictated utilizing Dragon dictation.         Isabel Rubio PA-C   [facility]  [address] [place] [address]  Office: [phone]

## 2024-02-16 NOTE — PLAN OF CARE
Goal Outcome Evaluation:  Plan of Care Reviewed With: patient        Progress: no change     Pt a direct admit this afternoon. AxOx4, calm and cooperative. NPO. IVF at 125 ml/hr. Surgery consulted. Significant other at bedside. Pt ambulating independently in room. Plan of care ongoing.

## 2024-02-16 NOTE — CONSULTS
Nutrition Services    Patient Name:  Matias Hughes  YOB: 1970  MRN: [id number]  Admit Date:  2/15/2024  Assessment Date:  02/16/24    Summary: LELO    Pt is a 53 y.o. male adm for SBO. H/o SBO x 4-5, with a history of lysis of adhesions back in 2014 and cholecystectomy in 2013. Nutrition assessment completed. Pt out of room at time of visit, spoke with pt wife. She endorses he has had two SBO within the same month. Pt has been experiencing decreased appetite, abdominal pain, N/V/D. Wt hx reviewed, 23 lb (9.7%) wt loss x 2 mo which is significant. Surgery plans to do a Gastrografin challenge. Currently NPO and awaiting diet advancement.   Labs: Na 135, Glu 153, BUN 34, ALT 42 , Total Bilirubin 1.3  Meds: IVFs @ 125 mL/hr, PPI    REC:  Advance diet as tolerated and once medically appropriate per MD    RD to follow per protocol.    CLINICAL NUTRITION ASSESSMENT      Reason for Assessment Nurse Admission Screen     Diagnosis/Problem   SBO    Medical/Surgical History Past Medical History:   Diagnosis Date    Abdominal adhesions 02/15/2024    Acute prostatitis 10/11/2018    10/2018; on background of chronic prostatitis     Denis's esophagus with dysplasia 10/30/2017    On EGD 10/2017; repeat EGD in 10 weeks improved --> EGD 11/2018 per Dr. Meehan --> 10/2021 with no IM in esophagus but present in antrum --> 3 years    Diverticulosis     Diverticulosis of large intestine without hemorrhage 11/02/2018    Fatty liver 06/22/2017    Noted on 6/2017 CT and again on 6/2021 MRI abdomen 'liver demonstrates minimal loss of signal on the out of phase T1-weighted images most consistent with steatosis'    GERD (gastroesophageal reflux disease)     H/O hypogonadism     Hemochromatosis     heterozygous for C282Y mutation hx elevated LFT's Ferritin elevated at dx    History of abdominal surgery (maynor,VINOD 2014) 02/15/2024    History of cholelithiasis     History of chronic prostatitis     2010 tx'd by Dr Cooper     "History of Clostridium difficile colitis     Hospitalized June 2013; with SBO    History of small bowel obstruction     related to adhesions from gallbladder surgery adhesions; 2013; 2015; 2016    History of small bowel obstruction 02/15/2024    related to adhesions from gallbladder surgery adhesions; 2013; 2015; 2016    IFG (impaired fasting glucose) 06/09/2017    Normal A1C    Metabolic syndrome 09/03/2021    IFG, truncal obesity, low HDL, HTN, and fatty liver meets criteria for metabolic syndrome    Overweight     Peptic ulcer disease 11/17/2017    Prehypertension     Rosacea 06/02/2016    Squamous cell carcinoma of skin     2012 sees derm q 6 months (Dr. Mitchell)    Upper back pain, chronic 07/23/2019    Ventral hernia without obstruction or gangrene 06/22/2017    Tiny, on CT 6/2017    Vitamin D deficiency        Past Surgical History:   Procedure Laterality Date    CHOLECYSTECTOMY  2013    ENDOSCOPY  2018    ENDOSCOPY N/A 10/29/2021    Procedure: ESOPHAGOGASTRODUODENOSCOPY WITH BIOPSY;  Surgeon: Steven Meehan MD;  Location: [facility] MAIN OR;  Service: Gastroenterology;  Laterality: N/A;    ENDOSCOPY AND COLONOSCOPY N/A 2017    LYSIS OF ABDOMINAL ADHESIONS  07/10/2014    after SBO    SINUS SURGERY  2007    persistent congestion ?? turbinate reduction    TONSILLECTOMY      1978        Anthropometrics        Current Height  Current Weight  BMI kg/m2  180 cm (70.87\")   97.7 kg (215 lb 4.8 oz)   30.14   Adjusted BMI (if applicable)    BMI Category Obese, Class I (30 - 34.9)   Ideal Body Weight (IBW) 75 kg    Usual Body Weight (UBW) 235 lbs per wt hx    Weight Trend Loss, Amount/Timeframe: 23 lb (9.7%) wt loss x 2 mo    Weight History Wt Readings from Last 30 Encounters:   02/14/24 1354 97.7 kg (215 lb 4.8 oz)   12/01/23 0927 108 kg (238 lb)   11/08/23 1141 104 kg (230 lb)   11/07/23 1543 105 kg (231 lb 8 oz)   06/01/23 1550 103 kg (226 lb 9.6 oz)   03/28/23 1505 107 kg (236 lb)   12/06/22 1436 106 kg (232 lb 11.2 " oz)   11/29/22 1454 104 kg (229 lb 1.6 oz)   09/13/22 1554 108 kg (237 lb 3.2 oz)   06/21/22 1526 107 kg (235 lb)   05/23/22 0810 106 kg (233 lb)   03/29/22 1546 108 kg (238 lb)   11/22/21 1537 104 kg (229 lb)   10/29/21 0855 105 kg (230 lb 12.8 oz)   10/26/21 1057 102 kg (225 lb)   10/12/21 1538 105 kg (231 lb 6.4 oz)   09/03/21 0904 104 kg (230 lb)   08/09/21 1044 106 kg (234 lb)   07/20/21 1555 106 kg (233 lb 3.2 oz)   05/19/21 1514 104 kg (228 lb 3.2 oz)   04/27/21 1529 102 kg (225 lb)   04/22/21 0944 101 kg (223 lb)   03/01/21 1426 106 kg (233 lb)   02/02/21 1509 107 kg (235 lb 1.6 oz)   11/03/20 1530 106 kg (233 lb 3.2 oz)   08/25/20 1249 103 kg (227 lb)   08/04/20 1528 106 kg (234 lb)   05/05/20 1534 106 kg (233 lb 3.2 oz)   02/07/20 1359 105 kg (232 lb)   02/04/20 1337 106 kg (234 lb 6.4 oz)   10/17/19 1543 105 kg (232 lb 3.2 oz)        Estimated Requirements         Weight used  97.7 kg     Calories  5214-7036 (20 kcal/kg, 22 kcal/kg)    Protein   (1.0 - 1.2 gm/kg)    Fluid  8489-6690 (1 mL/kcal)     Labs       Pertinent Labs    Results from last 7 days   Lab Units 02/16/24  0608 02/15/24  1721   SODIUM mmol/L 135* 135*   POTASSIUM mmol/L 3.8 4.1   CHLORIDE mmol/L 106 98   CO2 mmol/L 13.8* 20.0*   BUN mg/dL 34* 46*   CREATININE mg/dL 1.06 1.81*   CALCIUM mg/dL 7.9* 10.1   BILIRUBIN mg/dL 1.3* 2.0*   ALK PHOS U/L 64 77   ALT (SGPT) U/L 42* 51*   AST (SGOT) U/L 24 27   GLUCOSE mg/dL 153* 104*     Results from last 7 days   Lab Units 02/16/24  0608 02/15/24  1721   MAGNESIUM mg/dL  --  2.5   PHOSPHORUS mg/dL  --  4.9*   HEMOGLOBIN g/dL 13.9 16.3   HEMATOCRIT % 40.6 48.4   WBC 10*3/mm3 6.77 7.28   ALBUMIN g/dL 3.8 5.2     Results from last 7 days   Lab Units 02/16/24  0608 02/15/24  1721   INR   --  1.10   PLATELETS 10*3/mm3 158 204     COVID19   Date Value Ref Range Status   10/27/2021 Not Detected Not Detected - Ref. Range Final     Lab Results   Component Value Date    HGBA1C 5.10 11/28/2023           Medications           Scheduled Medications pantoprazole, 40 mg, Intravenous, Q12H  sodium chloride, 10 mL, Intravenous, Q12H       Infusions dextrose 5 % and sodium chloride 0.45 %, 125 mL/hr, Last Rate: 125 mL/hr (02/16/24 5684)       PRN Medications   acetaminophen **OR** acetaminophen **OR** acetaminophen    senna-docusate sodium **AND** polyethylene glycol **AND** bisacodyl **AND** bisacodyl    calcium carbonate    Calcium Replacement - Follow Nurse / BPA Driven Protocol    HYDROcodone-acetaminophen    HYDROmorphone **AND** naloxone    ketorolac    Magnesium Standard Dose Replacement - Follow Nurse / BPA Driven Protocol    ondansetron ODT **OR** ondansetron    Phosphorus Replacement - Follow Nurse / BPA Driven Protocol    Potassium Replacement - Follow Nurse / BPA Driven Protocol    sodium chloride    sodium chloride     Physical Findings          General Findings alert, obese, oriented, room air   Oral/Mouth Cavity WDL   Edema  no edema   Gastrointestinal hypoactive bowel sounds, non-distended , passing flatus, last bowel movement: 2/15   Skin  skin intact   Tubes/Drains/Lines none   NFPE Not indicated at this time   --  Current Nutrition Orders & Evaluation of Intake       Oral Nutrition     Food Allergies NKFA   Current PO Diet NPO Diet NPO Type: Sips with Meds   Supplement n/a   PO Evaluation     % PO Intake NPO    Factors Affecting Intake: abdominal pain, altered GI function, decreased appetite, diarrhea, nausea, vomiting   --  PES STATEMENT / NUTRITION DIAGNOSIS      Nutrition Dx Problem  Problem: Altered GI Function  Etiology: Medical Diagnosis - SBO and Factors Affecting Nutrition - abdominal pain, decreased appetite, N/V/D    Signs/Symptoms: NPO, Unintended Weight Change, and Report/Observation     NUTRITION INTERVENTION / PLAN OF CARE      Intervention Goal(s) Maintain nutrition status, Improved nutrition related labs, Establish goals of care, Reduce/improve symptoms, Meet estimated needs, Disease  management/therapy, Initiate feeding/diet, Establish PO intake, Tolerate PO , Advance diet, Appropriate weight loss, and PO intake goal %: 75%         RD Intervention/Action Await initiation/advancement of PO diet, Continue to monitor, and Care plan reviewed   --      Prescription/Orders:       PO Diet ADAT      Supplements       Enteral Nutrition       Parenteral Nutrition    New Prescription Ordered? Continue same per protocol, No changes at this time   --      Monitor/Evaluation Per protocol   Discharge Plan/Needs Pending clinical course   --    RD to follow per protocol.      Electronically signed by:  Keren Frias RDN, LD  02/16/24 08:01 EST

## 2024-02-17 ENCOUNTER — READMISSION MANAGEMENT (OUTPATIENT)
Dept: CALL CENTER | Facility: HOSPITAL | Age: 54
End: 2024-02-17
Payer: COMMERCIAL

## 2024-02-17 VITALS
RESPIRATION RATE: 16 BRPM | SYSTOLIC BLOOD PRESSURE: 132 MMHG | DIASTOLIC BLOOD PRESSURE: 80 MMHG | TEMPERATURE: 98.1 F | OXYGEN SATURATION: 98 % | HEART RATE: 84 BPM

## 2024-02-17 LAB
ALBUMIN SERPL-MCNC: 4.1 G/DL (ref 3.5–5.2)
ALBUMIN/GLOB SERPL: 1.9 G/DL
ALP SERPL-CCNC: 63 U/L (ref 39–117)
ALT SERPL W P-5'-P-CCNC: 41 U/L (ref 1–41)
ANION GAP SERPL CALCULATED.3IONS-SCNC: 9 MMOL/L (ref 5–15)
AST SERPL-CCNC: 23 U/L (ref 1–40)
BASOPHILS # BLD AUTO: 0.02 10*3/MM3 (ref 0–0.2)
BASOPHILS NFR BLD AUTO: 0.4 % (ref 0–1.5)
BILIRUB SERPL-MCNC: 0.7 MG/DL (ref 0–1.2)
BUN SERPL-MCNC: 20 MG/DL (ref 6–20)
BUN/CREAT SERPL: 18.9 (ref 7–25)
CALCIUM SPEC-SCNC: 8.5 MG/DL (ref 8.6–10.5)
CHLORIDE SERPL-SCNC: 109 MMOL/L (ref 98–107)
CO2 SERPL-SCNC: 24 MMOL/L (ref 22–29)
CREAT SERPL-MCNC: 1.06 MG/DL (ref 0.76–1.27)
DEPRECATED RDW RBC AUTO: 42.7 FL (ref 37–54)
EGFRCR SERPLBLD CKD-EPI 2021: 82.9 ML/MIN/1.73
EOSINOPHIL # BLD AUTO: 0.16 10*3/MM3 (ref 0–0.4)
EOSINOPHIL NFR BLD AUTO: 3.5 % (ref 0.3–6.2)
ERYTHROCYTE [DISTWIDTH] IN BLOOD BY AUTOMATED COUNT: 13.5 % (ref 12.3–15.4)
GLOBULIN UR ELPH-MCNC: 2.2 GM/DL
GLUCOSE SERPL-MCNC: 91 MG/DL (ref 65–99)
HCT VFR BLD AUTO: 40.1 % (ref 37.5–51)
HGB BLD-MCNC: 13.5 G/DL (ref 13–17.7)
IMM GRANULOCYTES # BLD AUTO: 0 10*3/MM3 (ref 0–0.05)
IMM GRANULOCYTES NFR BLD AUTO: 0 % (ref 0–0.5)
LYMPHOCYTES # BLD AUTO: 1.68 10*3/MM3 (ref 0.7–3.1)
LYMPHOCYTES NFR BLD AUTO: 36.5 % (ref 19.6–45.3)
MCH RBC QN AUTO: 29.3 PG (ref 26.6–33)
MCHC RBC AUTO-ENTMCNC: 33.7 G/DL (ref 31.5–35.7)
MCV RBC AUTO: 87.2 FL (ref 79–97)
MONOCYTES # BLD AUTO: 0.4 10*3/MM3 (ref 0.1–0.9)
MONOCYTES NFR BLD AUTO: 8.7 % (ref 5–12)
NEUTROPHILS NFR BLD AUTO: 2.34 10*3/MM3 (ref 1.7–7)
NEUTROPHILS NFR BLD AUTO: 50.9 % (ref 42.7–76)
NRBC BLD AUTO-RTO: 0 /100 WBC (ref 0–0.2)
PLATELET # BLD AUTO: 148 10*3/MM3 (ref 140–450)
PMV BLD AUTO: 9.7 FL (ref 6–12)
POTASSIUM SERPL-SCNC: 4 MMOL/L (ref 3.5–5.2)
PROT SERPL-MCNC: 6.3 G/DL (ref 6–8.5)
RBC # BLD AUTO: 4.6 10*6/MM3 (ref 4.14–5.8)
SODIUM SERPL-SCNC: 142 MMOL/L (ref 136–145)
WBC NRBC COR # BLD AUTO: 4.6 10*3/MM3 (ref 3.4–10.8)

## 2024-02-17 PROCEDURE — 85025 COMPLETE CBC W/AUTO DIFF WBC: CPT | Performed by: INTERNAL MEDICINE

## 2024-02-17 PROCEDURE — 96376 TX/PRO/DX INJ SAME DRUG ADON: CPT

## 2024-02-17 PROCEDURE — G0378 HOSPITAL OBSERVATION PER HR: HCPCS

## 2024-02-17 PROCEDURE — 80053 COMPREHEN METABOLIC PANEL: CPT | Performed by: INTERNAL MEDICINE

## 2024-02-17 RX ORDER — POLYETHYLENE GLYCOL 3350 17 G/17G
17 POWDER, FOR SOLUTION ORAL DAILY PRN
Qty: 510 G | Refills: 0 | Status: SHIPPED | OUTPATIENT
Start: 2024-02-17

## 2024-02-17 RX ORDER — AMOXICILLIN 250 MG
2 CAPSULE ORAL 2 TIMES DAILY PRN
Qty: 30 TABLET | Refills: 0 | Status: SHIPPED | OUTPATIENT
Start: 2024-02-17

## 2024-02-17 RX ADMIN — Medication 10 ML: at 08:18

## 2024-02-17 RX ADMIN — PANTOPRAZOLE SODIUM 40 MG: 40 INJECTION, POWDER, FOR SOLUTION INTRAVENOUS at 08:06

## 2024-02-17 NOTE — DISCHARGE SUMMARY
Patient Name: Matias Hughes  : 1970  MRN: [id number]    Date of Admission: 2/15/2024  Date of Discharge:  2024  Primary Care Physician: Bernard Souza MD      Chief Complaint:   No chief complaint on file.      Discharge Diagnoses     Active Hospital Problems    Diagnosis  POA    **SBO (small bowel obstruction) [K56.609]  Yes    History of abdominal surgery (maynor,VINOD ) [Z98.890]  Not Applicable    Abdominal adhesions [K66.0]  Yes    History of small bowel obstruction ; ; ,  [Z87.19]  Not Applicable    Metabolic syndrome [E88.810]  Yes    Essential hypertension [I10]  Yes    Peptic ulcer disease [K27.9]  Yes    Denis's esophagus with dysplasia [K22.719]  Yes    Fatty liver [K76.0]  Yes    Obesity (BMI 30-39.9) [E66.9]  Yes    IFG (impaired fasting glucose) [R73.01]  Yes    Hemochromatosis [E83.119]  Yes      Resolved Hospital Problems   No resolved problems to display.        Hospital Course     Mr. Hughes is a 53 y.o. male with a history of small bowel obstruction, Denis's esophagus, hypertension, hepatic steatosis who presented to [facility] initially complaining of abdominal pain.  Please see the admitting history and physical for further details.  He was admitted to the hospital for further evaluation and treatment.     Small bowel obstruction  - Noted on imaging following arrival.  He was initially made n.p.o., placed on bowel rest, treated with IVF and PRN medications for symptom control, general surgery and gastroenterology consulted and followed patient during his hospital stay.  He underwent small bowel follow-through which demonstrated contrast passing to the colon, no evidence of obstruction, no acute surgical intervention deemed warranted by general surgery.  Patient symptoms later improved, by time of discharge, he was having regular bowel movements and abdominal pain had resolved.  He was cleared for discharge by consultants with recommendation  that he keep his scheduled outpatient follow-up appointment with Dr. Donnelly to allow for ongoing assessment and management.     Acute kidney injury on CKD stage 2  Dehydration  - Noted on arrival, likely pre-renal, creatinine elevated on arrival above baseline which appeared to be ~1.0 to 1.1. Dehydration supported by DIYA and elevated BUN/Cr ratio. Renal US negative for obstruction or acute findings. Patient treated with IVF, which led to resolution of DIYA and dehydration, creatinine back to baseline by time of discharge. Recommend repeat CMP with PCP within 1 week for reassessment.     Hepatic steatosis  Transaminitis  - LFT elevated on arrival, however, subsequently improved and were within normal limits on most recent labs prior to discharge.  Gastroenterology was consulted and followed patient, no acute interventions warranted from their standpoint.  Recommend close monitoring after discharge.  Resume home medications as previously prescribed. Recommend repeat CMP with PCP within 1 week for reassessment. Follow up with GI within 1-2 weeks after discharge or as scheduled for reassessment.     Hypertension  - Blood pressure appears stable and acceptable acutely at this time. Continue home medication as previously prescribed. Recommend that patient check his blood pressure 2-3 times daily and record those values in log book and take with him to next PCP visit to allow for greater insight into treatment efficacy to guide further management decisions. Recommend heart healthy/low sodium diet. Recommend close follow up with PCP within 1 week after discharge for reassessment to guide ongoing management decisions.    Metabolic acidosis  - Noted on arrival, with elevated anion gap, UA with ketones, could be result of starvation ketosis from decreased intake. Also, patient was on IVF with NS which could be playing a role, ordered further workup which was unremarkable, changed IVF to LR, acidosis resolved by time of  discharge. Repeat labs with PCP at follow up appointment.    Peptic ulcer disease/Denis's esophagus  - Continue PPI as prescribed.  Follow-up with gastroenterology as scheduled after discharge for reassessment.     Obesity  - BMI 30.14 kg/m2. Complicating all medical problems.    Day of Discharge     Subjective:  Patient seen and examined this morning.  Hospital day 2.  He is awake and alert, sitting up in chair next to bedside, he states that he feels well today.  Having bowel movements, abdominal pain has resolved, no acute complaints, cleared for discharge by consultants with plans for outpatient follow-up.    Physical Exam:  Temp:  [98 °F (36.7 °C)-99.2 °F (37.3 °C)] 98.1 °F (36.7 °C)  Heart Rate:  [66-84] 84  Resp:  [16] 16  BP: (118-132)/(77-86) 132/80  There is no height or weight on file to calculate BMI.  Physical Exam  Vitals and nursing note reviewed.   Constitutional:       General: He is awake. He is not in acute distress.     Appearance: He is overweight.   Cardiovascular:      Rate and Rhythm: Normal rate and regular rhythm.      Pulses: Normal pulses.      Heart sounds: Normal heart sounds.   Pulmonary:      Effort: Pulmonary effort is normal. No respiratory distress.      Breath sounds: Normal breath sounds. No wheezing.   Abdominal:      Palpations: Abdomen is soft.      Tenderness: There is no abdominal tenderness.   Skin:     General: Skin is warm and dry.   Neurological:      Mental Status: He is alert.   Psychiatric:         Behavior: Behavior is cooperative.         Consultants     Consult Orders (all) (From admission, onward)       Start     Ordered    02/16/24 0702  Inpatient Gastroenterology Consult  IN AM        Specialty:  Gastroenterology  Provider:  Steven Meehan MD    02/16/24 0002    02/15/24 1632  Inpatient General Surgery Consult  Once        Specialty:  General Surgery  Provider:  Adonay Donnelly Jr., MD    02/15/24 1634    02/15/24 1630  Inpatient Consult to Case Management    Once        Provider:  (Not yet assigned)    02/15/24 1634    02/15/24 1533  Inpatient Nutrition Consult  Once        Provider:  (Not yet assigned)    02/15/24 1532                  Procedures     * Surgery not found *    Imaging Results (All)       Procedure Component Value Units Date/Time    FL Small Bowel Follow Through Single-Contrast [[id number]] Collected: 02/16/24 1425     Updated: 02/16/24 1436    Narrative:      Small bowel follow-through     Clinical: Abdominal pain, dilated small bowel     COMPARISON KUB 2/14/2014     FLUOROSCOPY TIME: 0, 15 images     Small bowel follow-through performed using Gastrografin.     FINDINGS: On the pulmonary  radiograph, the small bowel loops  appear slightly less pronounced than on the 2/14/2024 examination and  there is increased gas within the colon.     The patient was able to ingest Gastrografin without difficulty.  Gastrografin within the stomach only on the 15, 30, 60 and 90-minute  images. Delayed gastric emptying.     By 3 hours, contrast material migrates into the duodenum and proximal  jejunum. Duodenal and proximal jejunal loops have a normal appearance.  Next image at 5 hours demonstrates contrast material within the  remainder of the jejunum, the ileum as well as the colon. Some of the  jejunal loops measure up to 4-5 cm and some of the ileal loops measure  up to 3-4 cm. Several of the jejunal loops appear less pronounced in  caliber compared to the 2/14/2024 examination. Some of the jejunal loops  as well as some of the ileal loops are completely normal in caliber.      There is no tethering or kinking. No fold thickening seen. There is no  focal transition point identified. The remainder is unremarkable.     This report was finalized on 2/16/2024 2:33 PM by Dr. Donn Ocasio M.D  on Workstation: [id number]       US Renal Bilateral [[id number]] Collected: 02/16/24 0143     Updated: 02/16/24 0143    Narrative:        Patient: BERENICE GARCIA   "Time Out: 01:42  Exam(s): US RENAL     EXAM:    US Retroperitoneal Limited, Renal    CLINICAL HISTORY:     Reason for exam: vanesa and h o sbo.    TECHNIQUE:    Real-time limited ultrasound of the retroperitoneum with image   documentation.    COMPARISON:    No relevant prior studies available.    FINDINGS:    Right kidney:  Right kidney measures 9.7 cm.  No hydronephrosis, mass,   cyst, or stone.    Left kidney:  There is limited visualization of the left kidney.  Left   kidney measures 10.9 cm.  No hydronephrosis, mass, cyst, or stone as   visualized.    Bladder:  Bladder not visualized due to incomplete distention.    IMPRESSION:         No acute findings.      Impression:          Electronically signed by Drake Edward M.D. on 02-16-24 at 0142              Pertinent Labs     Results from last 7 days   Lab Units 02/17/24  0646 02/16/24  0608 02/15/24  1721   WBC 10*3/mm3 4.60 6.77 7.28   HEMOGLOBIN g/dL 13.5 13.9 16.3   PLATELETS 10*3/mm3 148 158 204     Results from last 7 days   Lab Units 02/17/24  0646 02/16/24  0608 02/15/24  1721   SODIUM mmol/L 142 135* 135*   POTASSIUM mmol/L 4.0 3.8 4.1   CHLORIDE mmol/L 109* 106 98   CO2 mmol/L 24.0 13.8* 20.0*   BUN mg/dL 20 34* 46*   CREATININE mg/dL 1.06 1.06 1.81*   GLUCOSE mg/dL 91 153* 104*   EGFR mL/min/1.73 82.9 82.9 43.6*     Results from last 7 days   Lab Units 02/17/24  0646 02/16/24  0608 02/15/24  1721   ALBUMIN g/dL 4.1 3.8 5.2   BILIRUBIN mg/dL 0.7 1.3* 2.0*   ALK PHOS U/L 63 64 77   AST (SGOT) U/L 23 24 27   ALT (SGPT) U/L 41 42* 51*     Results from last 7 days   Lab Units 02/17/24  0646 02/16/24  0608 02/15/24  1721   CALCIUM mg/dL 8.5* 7.9* 10.1   ALBUMIN g/dL 4.1 3.8 5.2   MAGNESIUM mg/dL  --   --  2.5   PHOSPHORUS mg/dL  --   --  4.9*         Results from last 7 days   Lab Units 02/16/24  0358   SODIUM UR mmol/L 107   CREATININE UR mg/dL 215.7         Invalid input(s): \"LDLCALC\"          Test Results Pending at Discharge       Discharge Details      "   Discharge Medications        New Medications        Instructions Start Date   polyethylene glycol 17 g packet  Commonly known as: MIRALAX   17 g, Oral, Daily PRN, Hold for loose stools/diarrhea      sennosides-docusate 8.6-50 MG per tablet  Commonly known as: PERICOLACE   2 tablets, Oral, 2 Times Daily PRN, Hold for loose stools/diarrhea             Continue These Medications        Instructions Start Date   cholecalciferol 25 MCG (1000 UT) tablet  Commonly known as: VITAMIN D3   1,000 Units, Oral, Daily      metroNIDAZOLE 1 % gel  Commonly known as: Metrogel   Topical, Daily      omeprazole 40 MG capsule  Commonly known as: priLOSEC   TAKE ONE CAPSULE BY MOUTH DAILY      ondansetron 4 MG tablet  Commonly known as: ZOFRAN   4 mg, Oral, Every 8 Hours PRN      pitavastatin calcium 2 MG tablet tablet  Commonly known as: LIVALO   2 mg, Oral, Nightly      psyllium 58.6 % powder  Commonly known as: METAMUCIL   Oral, Daily      riFAXIMin 550 MG tablet  Commonly known as: Xifaxan   550 mg, Oral, 3 Times Daily      telmisartan 80 MG tablet  Commonly known as: MICARDIS   TAKE ONE TABLET BY MOUTH DAILY               Allergies   Allergen Reactions    Cefdinir Hives       Discharge Disposition:  Home or Self Care      Discharge Diet:  Diet Order   Procedures    Diet: Liquid Diets; Clear Liquid; Fluid Consistency: Thin (IDDSI 0)       Discharge Activity:   Activity Instructions       Gradually Increase Activity Until at Pre-Hospitalization Level              CODE STATUS:    Code Status and Medical Interventions:   Ordered at: 02/15/24 1635     Code Status (Patient has no pulse and is not breathing):    CPR (Attempt to Resuscitate)     Medical Interventions (Patient has pulse or is breathing):    Full       Future Appointments   Date Time Provider Department Center   2/27/2024 11:10 AM Adonay Donnelly Jr., MD [facility]   2/27/2024  3:00 PM LAB CHAIR 1 [facility]   2/27/2024  3:30 PM CHAIR 07 [facility]   6/18/2024  3:00 PM LAB CHAIR 1 [facility]   6/18/2024  3:30 PM CHAIR 08 [facility]   10/8/2024  3:00 PM LAB CHAIR 1 [facility]   10/8/2024  3:20 PM Shakeel Milian II, MD [facility]   10/8/2024  3:45 PM CHAIR 06 [facility]     Additional Instructions for the Follow-ups that You Need to Schedule       Discharge Follow-up with PCP   As directed       Currently Documented PCP:    Bernard Souza MD    PCP Phone Number:    [phone]     Follow Up Details: Within 1 week after discharge for reassessment, medication symptom review, blood pressure check, CMP and CBC        Discharge Follow-up with Specialty: General surgery, gastroenterology   As directed      Specialty: General surgery, gastroenterology   Follow Up Details: Within 1 to 2 weeks after discharge or as scheduled               Follow-up Information       Bernard Souza MD .    Specialty: Internal Medicine  Why: Within 1 week after discharge for reassessment, medication symptom review, blood pressure check, CMP and CBC  Contact information:  [address] [address]  [phone]                             Additional Instructions for the Follow-ups that You Need to Schedule       Discharge Follow-up with PCP   As directed       Currently Documented PCP:    Bernard Souza MD    PCP Phone Number:    [phone]     Follow Up Details: Within 1 week after discharge for reassessment, medication symptom review, blood pressure check, CMP and CBC        Discharge Follow-up with Specialty: General surgery, gastroenterology   As directed      Specialty: General surgery, gastroenterology   Follow Up Details: Within 1 to 2 weeks after discharge or as scheduled            Time Spent on Discharge:  Greater than 30 minutes      Leroy Meyer DO  [facility]  02/17/24  09:00 EST

## 2024-02-17 NOTE — PLAN OF CARE
Goal Outcome Evaluation:  Plan of Care Reviewed With: patient        Progress: improving  Outcome Evaluation: Surgery saw this patient at beginning of my shift, stated it was okay for him to D/C if [facility] on board, spoke with NP who stated he would be D/C in the morning, IVF, RA, ad josh & a&o

## 2024-02-17 NOTE — CASE MANAGEMENT/SOCIAL WORK
Case Management Discharge Note      Final Note: dc home         Selected Continued Care - Discharged on 2/17/2024 Admission date: 2/15/2024 - Discharge disposition: Home or Self Care      Destination    No services have been selected for the patient.                Durable Medical Equipment    No services have been selected for the patient.                Dialysis/Infusion    No services have been selected for the patient.                Home Medical Care    No services have been selected for the patient.                Therapy    No services have been selected for the patient.                Community Resources    No services have been selected for the patient.                Community & DME    No services have been selected for the patient.                         Final Discharge Disposition Code: 01 - home or self-care

## 2024-02-17 NOTE — PROGRESS NOTES
"General Surgery Progress Note    CC: SBO    S: Patient states his pain resolved after he underwent SBFT and started having \"tons\" of bowel movements today. He has passed gas as well, denies any nausea or vomiting, and has been drinking clear liquids.    O:/79 (BP Location: Right arm, Patient Position: Lying)   Pulse 74   Temp 98 °F (36.7 °C) (Oral)   Resp 16   SpO2 100%     Intake & Output (last day)       None            GENERAL: awake, alert, comfortable appearing, resting in bed interactive, cooperative  HEENT: EOMI, clear sclera, moist mucus membranes   CHEST: normal work of breathing on room air  ABDOMEN: soft nontender mildly distended, well healed midline incision  EXTREMITIES: VILLA, no cyanosis or edema    SKIN: Warm and dry, no rash    LABS  Results from last 7 days   Lab Units 02/16/24  0608 02/15/24  1721   WBC 10*3/mm3 6.77 7.28   HEMOGLOBIN g/dL 13.9 16.3   HEMATOCRIT % 40.6 48.4   PLATELETS 10*3/mm3 158 204     Results from last 7 days   Lab Units 02/16/24  0608 02/15/24  1721   SODIUM mmol/L 135* 135*   POTASSIUM mmol/L 3.8 4.1   CHLORIDE mmol/L 106 98   CO2 mmol/L 13.8* 20.0*   BUN mg/dL 34* 46*   CREATININE mg/dL 1.06 1.81*   CALCIUM mg/dL 7.9* 10.1   BILIRUBIN mg/dL 1.3* 2.0*   ALK PHOS U/L 64 77   ALT (SGPT) U/L 42* 51*   AST (SGOT) U/L 24 27   GLUCOSE mg/dL 153* 104*     Results from last 7 days   Lab Units 02/15/24  1721   INR  1.10       IMAGING:  SBFT images and report reviewed - contrast passes to colon     A/P: 53 y.o. male with resolving partial small bowel obstruction     SBFT demonstrates contrast passing to colon.   Patient AVSS with resolution of his symptoms, tolerating diet and having bowel function.  No surgical intervention indicated at this time. Appears stable for discharge. He may keep his scheduled follow up appointment with Dr. Donnelly.  Discussed signs/symptoms concerning for need to call the office or return to the ER. Patient agreeable with " recommendations.    Trice Gillespie MD  General, Robotic and Endoscopic Surgery  [facility]    [address], [address]  [place], [place], [address]  P: [phone]  F: [phone]

## 2024-02-19 ENCOUNTER — TRANSITIONAL CARE MANAGEMENT TELEPHONE ENCOUNTER (OUTPATIENT)
Dept: CALL CENTER | Facility: HOSPITAL | Age: 54
End: 2024-02-19
Payer: COMMERCIAL

## 2024-02-19 NOTE — OUTREACH NOTE
Call Center TCM Note      Flowsheet Row Responses   [facility] patient discharged from? [place]   Does the patient have one of the following disease processes/diagnoses(primary or secondary)? Other   TCM attempt successful? No  [No VR]   Unsuccessful attempts Attempt 1   Call Status Left message            Emy Sanford RN    2/19/2024, 08:17 EST

## 2024-02-19 NOTE — OUTREACH NOTE
Call Center TCM Note      Flowsheet Row Responses   [facility] patient discharged from? [place]   Does the patient have one of the following disease processes/diagnoses(primary or secondary)? Other   TCM attempt successful? Yes   Call start time 1507   Call end time 1511   Discharge diagnosis SBO (small bowel obstruction)   Meds reviewed with patient/caregiver? Yes   Is the patient having any side effects they believe may be caused by any medication additions or changes? No   Does the patient have all medications ordered at discharge? Yes   Is the patient taking all medications as directed (includes completed medication regime)? Yes   Comments HOSP DC FU appt 2/22/24 345 pm.   Does the patient have an appointment with their PCP within 7-14 days of discharge? Yes   Has home health visited the patient within 72 hours of discharge? N/A   Psychosocial issues? No   Did the patient receive a copy of their discharge instructions? Yes   Nursing interventions Reviewed instructions with patient   What is the patient's perception of their health status since discharge? Same  [Reports doing same a dc. Having loose BMs .]   Is the patient/caregiver able to teach back signs and symptoms related to disease process for when to call PCP? Yes   Is the patient/caregiver able to teach back signs and symptoms related to disease process for when to call 911? Yes   Is the patient/caregiver able to teach back the hierarchy of who to call/visit for symptoms/problems? PCP, Specialist, Home health nurse, Urgent Care, ED, 911 Yes   TCM call completed? Yes   Wrap up additional comments Reviewed Dr instructions about recording BP for PCP appt.   Call end time 1511            Emy Sanford RN    2/19/2024, 15:11 EST

## 2024-02-22 ENCOUNTER — OFFICE VISIT (OUTPATIENT)
Dept: INTERNAL MEDICINE | Facility: CLINIC | Age: 54
End: 2024-02-22
Payer: COMMERCIAL

## 2024-02-22 VITALS
HEIGHT: 71 IN | SYSTOLIC BLOOD PRESSURE: 116 MMHG | WEIGHT: 219.7 LBS | DIASTOLIC BLOOD PRESSURE: 80 MMHG | BODY MASS INDEX: 30.76 KG/M2 | OXYGEN SATURATION: 100 % | TEMPERATURE: 97.8 F | HEART RATE: 82 BPM

## 2024-02-22 DIAGNOSIS — K56.609 SBO (SMALL BOWEL OBSTRUCTION): Primary | ICD-10-CM

## 2024-02-22 DIAGNOSIS — K66.0 ABDOMINAL ADHESIONS: Chronic | ICD-10-CM

## 2024-02-22 DIAGNOSIS — Z98.890 HISTORY OF ABDOMINAL SURGERY: Chronic | ICD-10-CM

## 2024-02-22 DIAGNOSIS — I10 ESSENTIAL HYPERTENSION: ICD-10-CM

## 2024-02-22 DIAGNOSIS — N17.9 ACUTE KIDNEY INJURY: ICD-10-CM

## 2024-02-22 DIAGNOSIS — Z87.19 HISTORY OF SMALL BOWEL OBSTRUCTION: Chronic | ICD-10-CM

## 2024-02-22 DIAGNOSIS — E83.110 HEREDITARY HEMOCHROMATOSIS: ICD-10-CM

## 2024-02-22 NOTE — PROGRESS NOTES
Transitional Care Follow Up Visit  Subjective     Matias Hughes is a 53 y.o. male who presents for a transitional care management visit.    Within 48 business hours after discharge our office contacted him via telephone to coordinate his care and needs.      I reviewed and discussed the details of that call along with the discharge summary, hospital problems, inpatient lab results, inpatient diagnostic studies, and consultation reports with Matias.     Current outpatient and discharge medications have been reconciled for the patient.  Reviewed by: Bernard Souza MD          2/17/2024    11:37 AM   Date of TCM Phone Call   [facility]   Date of Admission 2/15/2024   Date of Discharge 2/17/2024   Discharge Disposition Home or Self Care     Risk for Readmission (LACE) Score: 8 (2/17/2024  6:00 AM)      History of Present Illness   Matias Hughes is a 53 y.o. male RTC in TCM visit. Notes was here in 12/1/24 and was overall doing well.  Late 1/2024 had SBO event and was admitted for about 4 days.  However, after D/C home did OK until about 'super bowl Sunday' and had some recurrence of sx.  Saw GI in f/u and was on liquid diet at time due to recurrent sx and progression on XR.  Sent back to inpt and had GI and surgical inpt consult.  No etiology discovered.   Feeling well today, noting has been doing better over week as appetite and intake has improved. More sleep, more energy now. BM's were watery and now solid today.    Has miralax and Senokot but not sure how to incorporate.  Is on fiber supplement, on it long term.   Pressure low inpt and sent home on pressure med. Pt was struggling with energy at home and had normal pressures.  Is still off pressure med for now. Wonders what should do.  Has some checks from home with him 100-120's/ 70-80's.       Course During Hospital Stay:    Mr. Hughes is a 53 y.o. male with a history of small bowel obstruction, Denis's esophagus, hypertension, hepatic  steatosis who presented to [facility] initially complaining of abdominal pain.  Please see the admitting history and physical for further details.  He was admitted to the hospital for further evaluation and treatment.      Small bowel obstruction  - Noted on imaging following arrival.  He was initially made n.p.o., placed on bowel rest, treated with IVF and PRN medications for symptom control, general surgery and gastroenterology consulted and followed patient during his hospital stay.  He underwent small bowel follow-through which demonstrated contrast passing to the colon, no evidence of obstruction, no acute surgical intervention deemed warranted by general surgery.  Patient symptoms later improved, by time of discharge, he was having regular bowel movements and abdominal pain had resolved.  He was cleared for discharge by consultants with recommendation that he keep his scheduled outpatient follow-up appointment with Dr. Donnelly to allow for ongoing assessment and management.     Acute kidney injury on CKD stage 2  Dehydration  - Noted on arrival, likely pre-renal, creatinine elevated on arrival above baseline which appeared to be ~1.0 to 1.1. Dehydration supported by DIYA and elevated BUN/Cr ratio. Renal US negative for obstruction or acute findings. Patient treated with IVF, which led to resolution of DYIA and dehydration, creatinine back to baseline by time of discharge. Recommend repeat CMP with PCP within 1 week for reassessment.     Hepatic steatosis  Transaminitis  - LFT elevated on arrival, however, subsequently improved and were within normal limits on most recent labs prior to discharge.  Gastroenterology was consulted and followed patient, no acute interventions warranted from their standpoint.  Recommend close monitoring after discharge.  Resume home medications as previously prescribed. Recommend repeat CMP with PCP within 1 week for reassessment. Follow up with GI within 1-2 weeks after  discharge or as scheduled for reassessment.     Hypertension  - Blood pressure appears stable and acceptable acutely at this time. Continue home medication as previously prescribed. Recommend that patient check his blood pressure 2-3 times daily and record those values in log book and take with him to next PCP visit to allow for greater insight into treatment efficacy to guide further management decisions. Recommend heart healthy/low sodium diet. Recommend close follow up with PCP within 1 week after discharge for reassessment to guide ongoing management decisions.     Metabolic acidosis  - Noted on arrival, with elevated anion gap, UA with ketones, could be result of starvation ketosis from decreased intake. Also, patient was on IVF with NS which could be playing a role, ordered further workup which was unremarkable, changed IVF to LR, acidosis resolved by time of discharge. Repeat labs with PCP at follow up appointment.     Peptic ulcer disease/Denis's esophagus  - Continue PPI as prescribed.  Follow-up with gastroenterology as scheduled after discharge for reassessment.     Obesity  - BMI 30.14 kg/m2. Complicating all medical problems.     The following portions of the patient's history were reviewed and updated as appropriate: allergies, current medications, past medical history, past social history, and problem list.    Review of Systems   Constitutional:  Positive for appetite change (Improving) and fatigue (Improved in last 2 days). Negative for chills and fever.   Respiratory:  Negative for choking and chest tightness.    Cardiovascular:  Negative for chest pain.   Gastrointestinal:  Positive for constipation. Negative for abdominal distention, abdominal pain, diarrhea, nausea and vomiting.   Neurological:  Negative for dizziness (Resolved after inpatient stay) and weakness.       Objective   Physical Exam  Vitals reviewed.   Constitutional:       General: He is not in acute distress.     Appearance: He is  well-developed. He is not ill-appearing or toxic-appearing.   HENT:      Head: Normocephalic and atraumatic.      Mouth/Throat:      Mouth: Mucous membranes are moist. No oral lesions.      Tongue: No lesions.      Pharynx: Oropharynx is clear. No pharyngeal swelling, oropharyngeal exudate, posterior oropharyngeal erythema or uvula swelling.   Eyes:      General: No scleral icterus.     Pupils: Pupils are equal, round, and reactive to light.   Neck:      Vascular: No carotid bruit.   Cardiovascular:      Rate and Rhythm: Normal rate and regular rhythm.      Pulses:           Carotid pulses are 2+ on the right side and 2+ on the left side.       Radial pulses are 2+ on the right side and 2+ on the left side.      Heart sounds: Normal heart sounds.   Pulmonary:      Effort: Pulmonary effort is normal. No respiratory distress.      Breath sounds: Normal breath sounds. No wheezing, rhonchi or rales.   Abdominal:      General: Abdomen is protuberant. Bowel sounds are normal. There is no distension.      Palpations: Abdomen is soft. There is no mass.      Tenderness: There is no abdominal tenderness. There is no guarding or rebound.   Musculoskeletal:      Cervical back: Normal range of motion and neck supple. No muscular tenderness.      Right lower leg: No edema.      Left lower leg: No edema.   Lymphadenopathy:      Cervical: No cervical adenopathy.   Neurological:      Mental Status: He is alert and oriented to person, place, and time.      Cranial Nerves: No cranial nerve deficit.      Gait: Gait normal.   Psychiatric:         Attention and Perception: Attention normal.         Mood and Affect: Mood and affect normal.         Behavior: Behavior normal.         Thought Content: Thought content normal.         Assessment & Plan   Diagnoses and all orders for this visit:    1. SBO (small bowel obstruction) (Primary)  -     CBC & Differential  -     Comprehensive Metabolic Panel  -     Ferritin    2. History of abdominal  surgery (VINOD mcguire 2014)    3. History of small bowel obstruction 2013; 2015; 2016, 2023    4. Abdominal adhesions    5. Essential hypertension  -     Comprehensive Metabolic Panel    6. Hereditary hemochromatosis  -     CBC & Differential  -     Ferritin    7. Acute kidney injury  -     Comprehensive Metabolic Panel      Matias Hughes is a 53 y.o. male RTC in TCM visit after inpatient stay 2/15-2/17/24 for progressive SBO symptoms and x-ray evidence of SBO at GI appointment.  Recall, patient with history of recurrent SBO in the past, suspected adhesions.  Small bowel follow-through at recent inpatient evaluation demonstrated contrast passing into the colon.  Patient resolved symptoms with bowel rest.  Doing well today, improved energy and appetite.  Noted DIYA and transaminitis while inpatient, will F/U on labs tomorrow.   Surgery follow-up in outpatient setting upcoming.  HTN, 1 drug.  Low pressure symptoms and profound fatigue inpatient and in the first days out of the hospital.  Patient self held telmisartan noting normal pressures on home log, reviewed today.  Do see pressures drifting up slightly on home log in the last couple days.  D/W patient his reasons for relative hypotension with bowel obstruction and inpatient while n.p.o., as well as long half-life of blood pressure medication likely giving some tail benefit here.  Suspect pressures will drift up as patient returns to baseline, but agreed to remain off medication today while patient tracks blood pressure log daily.  Knows to call if pressures consistently> 130/80 and will consider restart of ARB.    No issues today that would take patient back to ED or inpatient, per discussion.  RTC as planned

## 2024-02-23 LAB
ALBUMIN SERPL-MCNC: 4.6 G/DL (ref 3.5–5.2)
ALBUMIN/GLOB SERPL: 2.1 G/DL
ALP SERPL-CCNC: 66 U/L (ref 39–117)
ALT SERPL-CCNC: 58 U/L (ref 1–41)
AST SERPL-CCNC: 29 U/L (ref 1–40)
BASOPHILS # BLD AUTO: 0.01 10*3/MM3 (ref 0–0.2)
BASOPHILS NFR BLD AUTO: 0.2 % (ref 0–1.5)
BILIRUB SERPL-MCNC: 0.5 MG/DL (ref 0–1.2)
BUN SERPL-MCNC: 10 MG/DL (ref 6–20)
BUN/CREAT SERPL: 10.4 (ref 7–25)
CALCIUM SERPL-MCNC: 8.9 MG/DL (ref 8.6–10.5)
CHLORIDE SERPL-SCNC: 104 MMOL/L (ref 98–107)
CO2 SERPL-SCNC: 24.9 MMOL/L (ref 22–29)
CREAT SERPL-MCNC: 0.96 MG/DL (ref 0.76–1.27)
EGFRCR SERPLBLD CKD-EPI 2021: 93.3 ML/MIN/1.73
EOSINOPHIL # BLD AUTO: 0.08 10*3/MM3 (ref 0–0.4)
EOSINOPHIL NFR BLD AUTO: 1.9 % (ref 0.3–6.2)
ERYTHROCYTE [DISTWIDTH] IN BLOOD BY AUTOMATED COUNT: 13.3 % (ref 12.3–15.4)
FERRITIN SERPL-MCNC: 84.9 NG/ML (ref 30–400)
GLOBULIN SER CALC-MCNC: 2.2 GM/DL
GLUCOSE SERPL-MCNC: 73 MG/DL (ref 65–99)
HCT VFR BLD AUTO: 40.3 % (ref 37.5–51)
HGB BLD-MCNC: 13.8 G/DL (ref 13–17.7)
IMM GRANULOCYTES # BLD AUTO: 0.01 10*3/MM3 (ref 0–0.05)
IMM GRANULOCYTES NFR BLD AUTO: 0.2 % (ref 0–0.5)
LYMPHOCYTES # BLD AUTO: 1.47 10*3/MM3 (ref 0.7–3.1)
LYMPHOCYTES NFR BLD AUTO: 35.8 % (ref 19.6–45.3)
MCH RBC QN AUTO: 29.3 PG (ref 26.6–33)
MCHC RBC AUTO-ENTMCNC: 34.2 G/DL (ref 31.5–35.7)
MCV RBC AUTO: 85.6 FL (ref 79–97)
MONOCYTES # BLD AUTO: 0.32 10*3/MM3 (ref 0.1–0.9)
MONOCYTES NFR BLD AUTO: 7.8 % (ref 5–12)
NEUTROPHILS # BLD AUTO: 2.22 10*3/MM3 (ref 1.7–7)
NEUTROPHILS NFR BLD AUTO: 54.1 % (ref 42.7–76)
NRBC BLD AUTO-RTO: 0 /100 WBC (ref 0–0.2)
PLATELET # BLD AUTO: 178 10*3/MM3 (ref 140–450)
POTASSIUM SERPL-SCNC: 4.3 MMOL/L (ref 3.5–5.2)
PROT SERPL-MCNC: 6.8 G/DL (ref 6–8.5)
RBC # BLD AUTO: 4.71 10*6/MM3 (ref 4.14–5.8)
SODIUM SERPL-SCNC: 142 MMOL/L (ref 136–145)
WBC # BLD AUTO: 4.11 10*3/MM3 (ref 3.4–10.8)

## 2024-02-26 ENCOUNTER — READMISSION MANAGEMENT (OUTPATIENT)
Dept: CALL CENTER | Facility: HOSPITAL | Age: 54
End: 2024-02-26
Payer: COMMERCIAL

## 2024-02-27 ENCOUNTER — APPOINTMENT (OUTPATIENT)
Dept: OTHER | Facility: HOSPITAL | Age: 54
End: 2024-02-27
Payer: COMMERCIAL

## 2024-02-27 ENCOUNTER — OFFICE VISIT (OUTPATIENT)
Dept: SURGERY | Facility: CLINIC | Age: 54
End: 2024-02-27
Payer: COMMERCIAL

## 2024-02-27 ENCOUNTER — INFUSION (OUTPATIENT)
Dept: ONCOLOGY | Facility: HOSPITAL | Age: 54
End: 2024-02-27
Payer: COMMERCIAL

## 2024-02-27 VITALS
WEIGHT: 225.8 LBS | DIASTOLIC BLOOD PRESSURE: 78 MMHG | BODY MASS INDEX: 31.61 KG/M2 | HEIGHT: 71 IN | SYSTOLIC BLOOD PRESSURE: 120 MMHG

## 2024-02-27 VITALS
DIASTOLIC BLOOD PRESSURE: 80 MMHG | SYSTOLIC BLOOD PRESSURE: 121 MMHG | RESPIRATION RATE: 15 BRPM | HEART RATE: 85 BPM | HEIGHT: 71 IN | TEMPERATURE: 98.4 F | WEIGHT: 224.6 LBS | OXYGEN SATURATION: 98 % | BODY MASS INDEX: 31.44 KG/M2

## 2024-02-27 DIAGNOSIS — K56.609 SBO (SMALL BOWEL OBSTRUCTION): Primary | ICD-10-CM

## 2024-02-27 DIAGNOSIS — E83.110 HEREDITARY HEMOCHROMATOSIS: Primary | ICD-10-CM

## 2024-02-27 PROCEDURE — 99195 PHLEBOTOMY: CPT

## 2024-02-27 PROCEDURE — 99214 OFFICE O/P EST MOD 30 MIN: CPT | Performed by: SURGERY

## 2024-02-27 RX ORDER — SODIUM CHLORIDE 9 MG/ML
250 INJECTION, SOLUTION INTRAVENOUS ONCE
OUTPATIENT
Start: 2024-03-05

## 2024-02-27 NOTE — OUTREACH NOTE
Medical Week 2 Survey      Flowsheet Row Responses   [facility] patient discharged from? [place]   Does the patient have one of the following disease processes/diagnoses(primary or secondary)? Other   Week 2 attempt successful? No   Unsuccessful attempts Attempt 1            Shaunna GR - Licensed Nurse

## 2024-02-27 NOTE — LETTER
February 27, 2024     Bernard Souza MD     Patient: Matias Hughes   YOB: 1970   Date of Visit: 2/27/2024     Dear Bernard Souza MD:       Thank you for referring Matias Hughes to me for evaluation. Below are the relevant portions of my assessment and plan of care.    If you have questions, please do not hesitate to call me. I look forward to following Matias along with you.         Sincerely,        Adonay Donnelly Jr., MD        CC:   No Recipients    Adonay Donnelly Jr., MD  02/27/24 1257  Sign when Signing Visit  Subjective  Matias Hughes is a 53 y.o. male who returns to the office for recurrent small bowel obstructions.    History of Present Illness     The patient had a laparoscopic cholecystectomy in 2013 and then required an open laparotomy with lysis of adhesions in 2014.  Since that time, over the intervening 10 years, he has had 4 different episodes of small bowel obstruction.  The first 2 episodes were  by several years and were characterized by a small bowel obstruction that resolved spontaneously.  He then returned to his normal state of health and felt well.  He developed symptoms of a small bowel obstruction and was admitted to a [place] facility on 1/24/2024.  He was managed conservatively and discharged but never really felt like he recovered.  He continued to have abdominal symptoms but was able to manage reasonably well.  He was seen by gastroenterologist who ordered abdominal x-rays and told him that he continued findings consistent with a partial small bowel obstruction.  He ultimately came to [facility] and was admitted.  He had a small bowel follow-through that showed no evidence of an obstruction and elicited numerous bowel movements.  He felt very good after that procedure and was discharged.  He now presents to our office in follow-up with chronic and mild symptoms.  He is able to eat but will feel full and a little bloated after eating.  He has some mild abdominal  pain.  He is having bowel function.    Review of Systems   Constitutional:  Negative for fatigue and fever.   Respiratory:  Negative for chest tightness and shortness of breath.    Cardiovascular:  Negative for chest pain and palpitations.   Gastrointestinal:  Positive for abdominal pain. Negative for blood in stool, constipation, diarrhea, nausea and vomiting.     Past Medical History:   Diagnosis Date   • Abdominal adhesions 02/15/2024   • Acute prostatitis 10/11/2018    10/2018; on background of chronic prostatitis    • Denis's esophagus with dysplasia 10/30/2017    On EGD 10/2017; repeat EGD in 10 weeks improved --> EGD 11/2018 per Dr. Meehan --> 10/2021 with no IM in esophagus but present in antrum --> 3 years   • Cholelithiasis 2012   • Diverticulosis    • Diverticulosis of large intestine without hemorrhage 11/02/2018   • Fatty liver 06/22/2017    Noted on 6/2017 CT and again on 6/2021 MRI abdomen 'liver demonstrates minimal loss of signal on the out of phase T1-weighted images most consistent with steatosis'   • GERD (gastroesophageal reflux disease)    • H/O hypogonadism    • Hemochromatosis     heterozygous for [id number] mutation hx elevated LFT's Ferritin elevated at dx   • History of abdominal surgery ([place]nor,VINOD 2014) 02/15/2024   • History of cholelithiasis    • History of chronic prostatitis     2010 tx'd by Dr Cooper   • History of Clostridium difficile colitis     Hospitalized June 2013; with SBO   • History of small bowel obstruction     related to adhesions from gallbladder surgery adhesions; 2013; 2015; 2016   • History of small bowel obstruction 02/15/2024    related to adhesions from gallbladder surgery adhesions; 2013; 2015; 2016   • Hypertension 2019   • IFG (impaired fasting glucose) 06/09/2017    Normal A1C   • Metabolic syndrome 09/03/2021    IFG, truncal obesity, low HDL, HTN, and fatty liver meets criteria for metabolic syndrome   • Overweight    • Peptic ulcer disease 11/17/2017   •  Prehypertension    • Rosacea 06/02/2016   • Squamous cell carcinoma of skin     2012 sees derm q 6 months (Dr. Mitchell)   • Upper back pain, chronic 07/23/2019   • Ventral hernia without obstruction or gangrene 06/22/2017    [place], on CT 6/2017   • Vitamin D deficiency      Past Surgical History:   Procedure Laterality Date   • CHOLECYSTECTOMY  2013   • COLONOSCOPY     • ENDOSCOPY  2018   • ENDOSCOPY N/A 10/29/2021    Procedure: ESOPHAGOGASTRODUODENOSCOPY WITH BIOPSY;  Surgeon: Steven Meehan MD;  [place] [facility];  Service: Gastroenterology;  Laterality: N/A;   • ENDOSCOPY AND COLONOSCOPY N/A 2017   • LYSIS OF ABDOMINAL ADHESIONS  07/10/2014    after SBO   • SINUS SURGERY  2007    persistent congestion ?? turbinate reduction   • TONSILLECTOMY      1978     Family History   Problem Relation Age of Onset   • Hypothyroidism Mother    • Rheum arthritis Maternal Aunt    • Breast cancer Maternal Aunt    • Breast cancer Maternal Grandmother    • Cancer Maternal Grandmother         Breast / Bone   • Glaucoma Paternal Grandmother    • Migraines Daughter    • Seizures Daughter         Epilepsy   • Migraines Son    • Cancer Maternal Aunt    • Colon cancer Neg Hx    • Colon polyps Neg Hx    • Malig Hyperthermia Neg Hx    • Crohn's disease Neg Hx    • Irritable bowel syndrome Neg Hx    • Ulcerative colitis Neg Hx      Social History     Socioeconomic History   • Marital status:      Spouse name: Shante   • Number of children: 2   • Years of education: College   Tobacco Use   • Smoking status: Never   • Smokeless tobacco: Never   Vaping Use   • Vaping Use: Never used   Substance and Sexual Activity   • Alcohol use: Yes     Alcohol/week: 5.0 standard drinks of alcohol     Types: 5 Cans of beer per week     Comment: 2-5 drinks/ week   • Drug use: No   • Sexual activity: Yes     Partners: Female     Birth control/protection: None     Comment: wife only; no hx STD's       Objective  Physical Exam  Constitutional:        Appearance: Normal appearance. He is well-developed. He is not toxic-appearing.   Eyes:      General: No scleral icterus.  Pulmonary:      Effort: Pulmonary effort is normal. No respiratory distress.   Abdominal:      Palpations: Abdomen is soft.      Tenderness: There is no abdominal tenderness.   Skin:     General: Skin is warm and dry.   Neurological:      Mental Status: He is alert and oriented to person, place, and time.   Psychiatric:         Behavior: Behavior normal.         Thought Content: Thought content normal.         Judgment: Judgment normal.              Assessment & Plan    1.  Partial small bowel obstruction: The patient had surgery about 10 years ago and has had 4 separate episodes of small bowel obstruction.  The first 2 responded well to conservative management and he returned to normal.  The second 2 have occurred this year and rapid sequence and he has not returned to his normal state of health.  His symptoms are highly suggestive of an underlying low to moderate grade partial small bowel obstruction.  He is able to function reasonably well.  A CT scan of the abdomen and pelvis has been ordered to try to compare his small bowel at his baseline to the findings in the hospital during the acute SBO.  This will help determine if surgical management is necessary to try to prevent recurrences of the SBO.  Further decisions will be based on the CT scan of the abdomen and pelvis.

## 2024-02-27 NOTE — PROGRESS NOTES
Subjective   Matias Hughes is a 53 y.o. male who returns to the office for recurrent small bowel obstructions.    History of Present Illness     The patient had a laparoscopic cholecystectomy in 2013 and then required an open laparotomy with lysis of adhesions in 2014.  Since that time, over the intervening 10 years, he has had 4 different episodes of small bowel obstruction.  The first 2 episodes were  by several years and were characterized by a small bowel obstruction that resolved spontaneously.  He then returned to his normal state of health and felt well.  He developed symptoms of a small bowel obstruction and was admitted to a [place] facility on 1/24/2024.  He was managed conservatively and discharged but never really felt like he recovered.  He continued to have abdominal symptoms but was able to manage reasonably well.  He was seen by gastroenterologist who ordered abdominal x-rays and told him that he continued findings consistent with a partial small bowel obstruction.  He ultimately came to [facility] and was admitted.  He had a small bowel follow-through that showed no evidence of an obstruction and elicited numerous bowel movements.  He felt very good after that procedure and was discharged.  He now presents to our office in follow-up with chronic and mild symptoms.  He is able to eat but will feel full and a little bloated after eating.  He has some mild abdominal pain.  He is having bowel function.    Review of Systems   Constitutional:  Negative for fatigue and fever.   Respiratory:  Negative for chest tightness and shortness of breath.    Cardiovascular:  Negative for chest pain and palpitations.   Gastrointestinal:  Positive for abdominal pain. Negative for blood in stool, constipation, diarrhea, nausea and vomiting.     Past Medical History:   Diagnosis Date    Abdominal adhesions 02/15/2024    Acute prostatitis 10/11/2018    10/2018; on background of chronic prostatitis     Denis's  esophagus with dysplasia 10/30/2017    On EGD 10/2017; repeat EGD in 10 weeks improved --> EGD 11/2018 per Dr. Meehan --> 10/2021 with no IM in esophagus but present in antrum --> 3 years    Cholelithiasis 2012    Diverticulosis     Diverticulosis of large intestine without hemorrhage 11/02/2018    Fatty liver 06/22/2017    Noted on 6/2017 CT and again on 6/2021 MRI abdomen 'liver demonstrates minimal loss of signal on the out of phase T1-weighted images most consistent with steatosis'    GERD (gastroesophageal reflux disease)     H/O hypogonadism     Hemochromatosis     heterozygous for C282Y mutation hx elevated LFT's Ferritin elevated at dx    History of abdominal surgery (VINOD mcguire 2014) 02/15/2024    History of cholelithiasis     History of chronic prostatitis     2010 tx'd by Dr Cooper    History of Clostridium difficile colitis     Hospitalized June 2013; with SBO    History of small bowel obstruction     related to adhesions from gallbladder surgery adhesions; 2013; 2015; 2016    History of small bowel obstruction 02/15/2024    related to adhesions from gallbladder surgery adhesions; 2013; 2015; 2016    Hypertension 2019    IFG (impaired fasting glucose) 06/09/2017    Normal A1C    Metabolic syndrome 09/03/2021    IFG, truncal obesity, low HDL, HTN, and fatty liver meets criteria for metabolic syndrome    Overweight     Peptic ulcer disease 11/17/2017    Prehypertension     Rosacea 06/02/2016    Squamous cell carcinoma of skin     2012 sees derm q 6 months (Dr. Mitchell)    Upper back pain, chronic 07/23/2019    Ventral hernia without obstruction or gangrene 06/22/2017    Tiny, on CT 6/2017    Vitamin D deficiency      Past Surgical History:   Procedure Laterality Date    CHOLECYSTECTOMY  2013    COLONOSCOPY      ENDOSCOPY  2018    ENDOSCOPY N/A 10/29/2021    Procedure: ESOPHAGOGASTRODUODENOSCOPY WITH BIOPSY;  Surgeon: Steven Meehan MD;  Location: [facility] [place];  Service: Gastroenterology;  Laterality:  N/A;    ENDOSCOPY AND COLONOSCOPY N/A 2017    LYSIS OF ABDOMINAL ADHESIONS  07/10/2014    after SBO    SINUS SURGERY  2007    persistent congestion ?? turbinate reduction    TONSILLECTOMY      1978     Family History   Problem Relation Age of Onset    Hypothyroidism Mother     Rheum arthritis Maternal Aunt     Breast cancer Maternal Aunt     Breast cancer Maternal Grandmother     Cancer Maternal Grandmother         Breast / Bone    Glaucoma Paternal Grandmother     Migraines Daughter     Seizures Daughter         Epilepsy    Migraines Son     Cancer Maternal Aunt     Colon cancer Neg Hx     Colon polyps Neg Hx     Malig Hyperthermia Neg Hx     Crohn's disease Neg Hx     Irritable bowel syndrome Neg Hx     Ulcerative colitis Neg Hx      Social History     Socioeconomic History    Marital status:      Spouse name: Shante    Number of children: 2    Years of education: College   Tobacco Use    Smoking status: Never    Smokeless tobacco: Never   Vaping Use    Vaping Use: Never used   Substance and Sexual Activity    Alcohol use: Yes     Alcohol/week: 5.0 standard drinks of alcohol     Types: 5 Cans of beer per week     Comment: 2-5 drinks/ week    Drug use: No    Sexual activity: Yes     Partners: Female     Birth control/protection: None     Comment: wife only; no hx STD's       Objective   Physical Exam  Constitutional:       Appearance: Normal appearance. He is well-developed. He is not toxic-appearing.   Eyes:      General: No scleral icterus.  Pulmonary:      Effort: Pulmonary effort is normal. No respiratory distress.   Abdominal:      Palpations: Abdomen is soft.      Tenderness: There is no abdominal tenderness.   Skin:     General: Skin is warm and dry.   Neurological:      Mental Status: He is alert and oriented to person, place, and time.   Psychiatric:         Behavior: Behavior normal.         Thought Content: Thought content normal.         Judgment: Judgment normal.              Assessment & Plan      1.  Partial small bowel obstruction: The patient had surgery about 10 years ago and has had 4 separate episodes of small bowel obstruction.  The first 2 responded well to conservative management and he returned to normal.  The second 2 have occurred this year and rapid sequence and he has not returned to his normal state of health.  His symptoms are highly suggestive of an underlying low to moderate grade partial small bowel obstruction.  He is able to function reasonably well.  A CT scan of the abdomen and pelvis has been ordered to try to compare his small bowel at his baseline to the findings in the hospital during the acute SBO.  This will help determine if surgical management is necessary to try to prevent recurrences of the SBO.  Further decisions will be based on the CT scan of the abdomen and pelvis.

## 2024-02-29 ENCOUNTER — READMISSION MANAGEMENT (OUTPATIENT)
Dept: CALL CENTER | Facility: HOSPITAL | Age: 54
End: 2024-02-29
Payer: COMMERCIAL

## 2024-02-29 NOTE — OUTREACH NOTE
Medical Week 2 Survey      Flowsheet Row Responses   [facility] patient discharged from? [place]   Does the patient have one of the following disease processes/diagnoses(primary or secondary)? Other   Week 2 attempt successful? Yes   Call start time 1155   Discharge diagnosis SBO (small bowel obstruction)   Call end time 1156   Is the patient taking all medications as directed (includes completed medication regime)? Yes   Does the patient have a primary care provider?  Yes   Comments regarding PCP has seen PCP   Has the patient kept scheduled appointments due by today? Yes   Has home health visited the patient within 72 hours of discharge? N/A   Psychosocial issues? No   What is the patient's perception of their health status since discharge? Improving   Is the patient/caregiver able to teach back signs and symptoms related to disease process for when to call PCP? Yes   Is the patient/caregiver able to teach back signs and symptoms related to disease process for when to call 911? Yes   Is the patient/caregiver able to teach back the hierarchy of who to call/visit for symptoms/problems? PCP, Specialist, Home health nurse, Urgent Care, ED, 911 Yes   Week 2 Call Completed? Yes   Graduated Yes   Did the patient feel the follow up calls were helpful during their recovery period? Yes   Was the number of calls appropriate? Yes   Is the patient interested in additional calls from an ambulatory ? No   Would this patient benefit from a Referral to Amb Social Work? No   Graduated/Revoked comments Doing great, has been to several f/u appts, no further calls needed.   Call end time 1156            Consuelo EPSTEIN - Gloria Nurse

## 2024-03-08 DIAGNOSIS — I10 ESSENTIAL HYPERTENSION: ICD-10-CM

## 2024-03-08 RX ORDER — TELMISARTAN 80 MG/1
80 TABLET ORAL DAILY
Qty: 90 TABLET | Refills: 2 | Status: SHIPPED | OUTPATIENT
Start: 2024-03-08

## 2024-03-12 ENCOUNTER — OFFICE VISIT (OUTPATIENT)
Dept: GASTROENTEROLOGY | Facility: CLINIC | Age: 54
End: 2024-03-12
Payer: COMMERCIAL

## 2024-03-12 VITALS
HEART RATE: 99 BPM | DIASTOLIC BLOOD PRESSURE: 78 MMHG | OXYGEN SATURATION: 98 % | SYSTOLIC BLOOD PRESSURE: 108 MMHG | WEIGHT: 222.6 LBS | BODY MASS INDEX: 31.16 KG/M2 | HEIGHT: 71 IN | TEMPERATURE: 97.3 F

## 2024-03-12 DIAGNOSIS — R14.0 ABDOMINAL BLOATING: ICD-10-CM

## 2024-03-12 DIAGNOSIS — K22.719 BARRETT'S ESOPHAGUS WITH DYSPLASIA: ICD-10-CM

## 2024-03-12 DIAGNOSIS — K56.609 SMALL BOWEL OBSTRUCTION: ICD-10-CM

## 2024-03-12 DIAGNOSIS — K21.9 GASTROESOPHAGEAL REFLUX DISEASE WITHOUT ESOPHAGITIS: Primary | ICD-10-CM

## 2024-03-12 NOTE — PROGRESS NOTES
Chief Complaint   Patient presents with    Follow-up     Posthospitalization follow-up           History of Present Illness    Patient is a 53 y.o. who presents to the office for follow up evaluation.  Last in office visit was on  2/14/24 . Patient has a significant past medical history of multiple small bowel obstruction  and multiple hospitalization to [facility] from 1/20/2024 to 1/27/2024  followed by [facility] on 2/15 treated with conservative measures including bowel rest and IV fluid rehydration., GERD with Denis's esophagus.  He is also followed by Dr. Noonan for hemochromatosis.     Previous assessment thought that multiple small bowel obstructions likely related to adhesive disease with previous lysis of adhesions in 2014.     10/29/2021 EGD for surveillance of Denis's esophagus revealed:      Esophageal mucosal changes consistent with short-segment Denis's esophagus.    Erythematous mucosa in the stomach   Normal examined duodenum.   The examination was otherwise normal     Biopsies without histologic evidence of Denis's esophagus however some intestinal metaplasia noted in gastric antrum recommended continued surveillance with EGD in 3 years due 10/29/2024.     Colonoscopy 10/25/2017 Dr. Meehan:      Diverticulosis     For GERD, he continues on omeprazole 40 mg once daily approximately 30 to 60 minutes prior to breakfast.  Describes upper GI symptoms as overall well-controlled without heartburn, nausea, vomiting, or dysphagia.    Since discharge from the inpatient setting he describes bowel habits as occurring regularly with utilization of intermittent half capful of MiraLAX which has helped to keep bowel habits occurring daily without visible melena or hematochezia.  Confirms a reduction in abdominal distention since last in office visit.    No unintentional weight loss and is tolerating diet without issue.    Patient denies known family history of colon polyps, colon cancer, or IBD.       Result  "Review :         Office Visit with Adonay Donnelly Jr., MD (02/27/2024)      CT Abdomen & Pelvis W IV Contrast Without Oral Contrast (01/24/2024 11:21)   Morphologic changes of chronic liver disease as evidenced by  periportal widening and notching in the posterior hemiliver. No reji  surface nodularity. The spleen is enlarged measuring 16.7 cm anterior   posterior. The pancreas, adrenals, and kidneys are unremarkable.   small bowel dilation with transition point in the left   midabdomen. No associated abnormal bowel wall thickening or pneumatosis.   Downstream loops of small bowel are normal in caliber and   not collapsed suggesting low-grade obstruction. The appendix is normal. Sigmoid diverticulosis is present without acute inflammation.   Office Visit with Steven Meehan MD (05/19/2021)   FL Enteroclysis (06/08/2021)    revealed mild enteroperitoneal adhesions in the right lower abdomen and left mid abdomen without evidence of small bowel obstruction.      UPPER GI ENDOSCOPY (10/29/2021 09:44)   Tissue Pathology Exam (10/29/2021 10:03)   Vital Signs:   /78   Pulse 99   Temp 97.3 °F (36.3 °C)   Ht 180.3 cm (71\")   Wt 101 kg (222 lb 9.6 oz)   SpO2 98%   BMI 31.05 kg/m²     Body mass index is 31.05 kg/m².     Physical Exam  Vitals reviewed.   Constitutional:       General: He is not in acute distress.     Appearance: Normal appearance. He is not ill-appearing.   Eyes:      General: No scleral icterus.  Pulmonary:      Effort: Pulmonary effort is normal. No respiratory distress.   Abdominal:      General: Bowel sounds are normal. There is no distension.      Palpations: Abdomen is soft. Abdomen is not rigid. There is no mass or pulsatile mass.      Tenderness: There is no abdominal tenderness. There is no guarding or rebound.      Hernia: No hernia is present.   Skin:     Coloration: Skin is not jaundiced.   Neurological:      Mental Status: He is alert and oriented to person, place, and time. "   Psychiatric:         Thought Content: Thought content normal.         Judgment: Judgment normal.           Assessment and Plan    Diagnoses and all orders for this visit:    1. Gastroesophageal reflux disease without esophagitis (Primary)    2. Small bowel obstruction    3. Abdominal bloating    4. Denis's esophagus with dysplasia           Discussion:    Patient is a pleasant 53-year-old male who presents today for follow-up after recent hospitalization for small bowel obstruction.  Concur with plan to follow Dr. Sheehan recommendations to proceed with CT evaluation to assess for underlying cause to recurrent obstructions such as abdominal adhesions.    While completing workup I have encouraged him to loosely follow a low residue diet and if any onset of abdominal distention or prodromal symptoms consistent with previous small bowel obstructions to begin clear liquid diet as tolerated.  If at any point he is unable to tolerate oral intake he will go to nearest ER and or contact our office for additional recommendations.    Informed patient of plans to complete surveillance EGD in October 2024 for Denis's esophagus.  Next colonoscopy evaluation for colon cancer screening due October 2027.    He is agreeable with this plan and is aware of alarm symptoms that would warrant sooner evaluation by her office or ER.  Will continue to follow-up with our office as needed.    Patient is agreeable to the outlined above treatment plan.  Verbalizes understanding and will contact office for any new or worsening concerns.  All questions answered and support provided.        Patient Instructions   Next EGD due 10/2023     Continue Miralax as you are currently doing     For GERD:    Follow antireflux precautions:    Continue omeprazole 40 mg once daily   Avoiding eating within 3 to 4 hours of bedtime.    Avoid foods that can trigger symptoms which may include:  citrus fruits  spicy foods,  Tomatoes  Red sauces    Chocolate  coffee/tea  caffeinated or carbonated beverages  alcohol    Let us know if anything changes as we are happy to help         EMR Dragon/Transcription Disclaimer:  This document has been Dictated utilizing Dragon dictation.

## 2024-03-12 NOTE — PATIENT INSTRUCTIONS
Next EGD due 10/2023     Continue Miralax as you are currently doing     For GERD:    Follow antireflux precautions:    Continue omeprazole 40 mg once daily   Avoiding eating within 3 to 4 hours of bedtime.    Avoid foods that can trigger symptoms which may include:  citrus fruits  spicy foods,  Tomatoes  Red sauces   Chocolate  coffee/tea  caffeinated or carbonated beverages  alcohol    Let us know if anything changes as we are happy to help    Lab: -58

## 2024-03-21 ENCOUNTER — HOSPITAL ENCOUNTER (OUTPATIENT)
Dept: CT IMAGING | Facility: HOSPITAL | Age: 54
Discharge: HOME | End: 2024-03-21
Payer: COMMERCIAL

## 2024-03-21 ENCOUNTER — APPOINTMENT (OUTPATIENT)
Dept: OTHER | Facility: HOSPITAL | Age: 54
End: 2024-03-21
Payer: COMMERCIAL

## 2024-03-21 DIAGNOSIS — K56.609 SBO (SMALL BOWEL OBSTRUCTION): ICD-10-CM

## 2024-03-21 DIAGNOSIS — Z09 FOLLOW-UP EXAM: ICD-10-CM

## 2024-03-21 PROCEDURE — 0 DIATRIZOATE MEGLUMINE & SODIUM PER 1 ML: Performed by: SURGERY

## 2024-03-21 PROCEDURE — 74177 CT ABD & PELVIS W/CONTRAST: CPT

## 2024-03-21 PROCEDURE — 25510000001 IOPAMIDOL 61 % SOLUTION: Performed by: SURGERY

## 2024-03-21 RX ADMIN — DIATRIZOATE MEGLUMINE AND DIATRIZOATE SODIUM 30 ML: 660; 100 LIQUID ORAL; RECTAL at 08:10

## 2024-03-21 RX ADMIN — IOPAMIDOL 85 ML: 612 INJECTION, SOLUTION INTRAVENOUS at 09:15

## 2024-03-26 ENCOUNTER — TELEPHONE (OUTPATIENT)
Dept: SURGERY | Facility: CLINIC | Age: 54
End: 2024-03-26
Payer: COMMERCIAL

## 2024-03-26 NOTE — TELEPHONE ENCOUNTER
The patient was called on telephone and the results of the CT scan of the abdomen and pelvis were discussed with him.  There was no evidence of small bowel dilatation to suggest any degree of small bowel obstruction.  He does have a moderate amount of stool in his colon and states that he has had some constipation.  He was encouraged to use Citrucel or Metamucil on a regular basis with plenty of water.  He was also encouraged to use MiraLAX daily.    He asked about diverticulosis which was present but no diverticulitis and this was explained to him as well as the importance of maintaining a high-fiber diet unless he develops obstructive symptoms.    He will follow-up on an as-needed basis.

## 2024-06-18 ENCOUNTER — INFUSION (OUTPATIENT)
Dept: ONCOLOGY | Facility: HOSPITAL | Age: 54
End: 2024-06-18
Payer: COMMERCIAL

## 2024-06-18 ENCOUNTER — LAB (OUTPATIENT)
Dept: OTHER | Facility: HOSPITAL | Age: 54
End: 2024-06-18
Payer: COMMERCIAL

## 2024-06-18 DIAGNOSIS — E83.110 HEREDITARY HEMOCHROMATOSIS: ICD-10-CM

## 2024-06-18 LAB
ALBUMIN SERPL-MCNC: 4.6 G/DL (ref 3.5–5.2)
ALBUMIN/GLOB SERPL: 1.8 G/DL
ALP SERPL-CCNC: 61 U/L (ref 39–117)
ALT SERPL W P-5'-P-CCNC: 28 U/L (ref 1–41)
ANION GAP SERPL CALCULATED.3IONS-SCNC: 8.3 MMOL/L (ref 5–15)
AST SERPL-CCNC: 22 U/L (ref 1–40)
BASOPHILS # BLD AUTO: 0.01 10*3/MM3 (ref 0–0.2)
BASOPHILS NFR BLD AUTO: 0.2 % (ref 0–1.5)
BILIRUB SERPL-MCNC: 0.6 MG/DL (ref 0–1.2)
BUN SERPL-MCNC: 10 MG/DL (ref 6–20)
BUN/CREAT SERPL: 8.8 (ref 7–25)
CALCIUM SPEC-SCNC: 9 MG/DL (ref 8.6–10.5)
CHLORIDE SERPL-SCNC: 102 MMOL/L (ref 98–107)
CO2 SERPL-SCNC: 28.7 MMOL/L (ref 22–29)
CREAT SERPL-MCNC: 1.13 MG/DL (ref 0.76–1.27)
DEPRECATED RDW RBC AUTO: 47.5 FL (ref 37–54)
EGFRCR SERPLBLD CKD-EPI 2021: 76.8 ML/MIN/1.73
EOSINOPHIL # BLD AUTO: 0.07 10*3/MM3 (ref 0–0.4)
EOSINOPHIL NFR BLD AUTO: 1.4 % (ref 0.3–6.2)
ERYTHROCYTE [DISTWIDTH] IN BLOOD BY AUTOMATED COUNT: 14.8 % (ref 12.3–15.4)
FERRITIN SERPL-MCNC: 35.5 NG/ML (ref 30–400)
GLOBULIN UR ELPH-MCNC: 2.6 GM/DL
GLUCOSE SERPL-MCNC: 101 MG/DL (ref 65–99)
HCT VFR BLD AUTO: 39.5 % (ref 37.5–51)
HGB BLD-MCNC: 13.2 G/DL (ref 13–17.7)
IMM GRANULOCYTES # BLD AUTO: 0 10*3/MM3 (ref 0–0.05)
IMM GRANULOCYTES NFR BLD AUTO: 0 % (ref 0–0.5)
LYMPHOCYTES # BLD AUTO: 1.6 10*3/MM3 (ref 0.7–3.1)
LYMPHOCYTES NFR BLD AUTO: 32.3 % (ref 19.6–45.3)
MCH RBC QN AUTO: 29.5 PG (ref 26.6–33)
MCHC RBC AUTO-ENTMCNC: 33.4 G/DL (ref 31.5–35.7)
MCV RBC AUTO: 88.2 FL (ref 79–97)
MONOCYTES # BLD AUTO: 0.37 10*3/MM3 (ref 0.1–0.9)
MONOCYTES NFR BLD AUTO: 7.5 % (ref 5–12)
NEUTROPHILS NFR BLD AUTO: 2.9 10*3/MM3 (ref 1.7–7)
NEUTROPHILS NFR BLD AUTO: 58.6 % (ref 42.7–76)
NRBC BLD AUTO-RTO: 0 /100 WBC (ref 0–0.2)
PLATELET # BLD AUTO: 153 10*3/MM3 (ref 140–450)
PMV BLD AUTO: 9.3 FL (ref 6–12)
POTASSIUM SERPL-SCNC: 4.1 MMOL/L (ref 3.5–5.2)
PROT SERPL-MCNC: 7.2 G/DL (ref 6–8.5)
RBC # BLD AUTO: 4.48 10*6/MM3 (ref 4.14–5.8)
SODIUM SERPL-SCNC: 139 MMOL/L (ref 136–145)
WBC NRBC COR # BLD AUTO: 4.95 10*3/MM3 (ref 3.4–10.8)

## 2024-06-18 PROCEDURE — 36415 COLL VENOUS BLD VENIPUNCTURE: CPT

## 2024-06-18 PROCEDURE — 80053 COMPREHEN METABOLIC PANEL: CPT | Performed by: INTERNAL MEDICINE

## 2024-06-18 PROCEDURE — G0463 HOSPITAL OUTPT CLINIC VISIT: HCPCS

## 2024-06-18 PROCEDURE — 82728 ASSAY OF FERRITIN: CPT | Performed by: INTERNAL MEDICINE

## 2024-06-18 PROCEDURE — 85025 COMPLETE CBC W/AUTO DIFF WBC: CPT | Performed by: INTERNAL MEDICINE

## 2024-06-18 NOTE — NURSING NOTE
Pt here for possible phlebotomy. Ferritin level 35 and does not meet treatment parameters. Copy of labs given to pt and no complaints voiced. Pt instructed to call the office for any concerns and discharged in stable condition.     Lab Results   Component Value Date    FERRITIN 35.50 06/18/2024

## 2024-08-25 DIAGNOSIS — K22.719 BARRETT'S ESOPHAGUS WITH DYSPLASIA: ICD-10-CM

## 2024-08-26 RX ORDER — OMEPRAZOLE 40 MG/1
40 CAPSULE, DELAYED RELEASE ORAL DAILY
Qty: 90 CAPSULE | Refills: 3 | Status: SHIPPED | OUTPATIENT
Start: 2024-08-26

## 2024-09-13 ENCOUNTER — TELEPHONE (OUTPATIENT)
Dept: GASTROENTEROLOGY | Facility: CLINIC | Age: 54
End: 2024-09-13
Payer: COMMERCIAL

## 2024-09-13 ENCOUNTER — PREP FOR SURGERY (OUTPATIENT)
Dept: SURGERY | Facility: SURGERY CENTER | Age: 54
End: 2024-09-13
Payer: COMMERCIAL

## 2024-09-13 DIAGNOSIS — K22.719 BARRETT'S ESOPHAGUS WITH DYSPLASIA: Primary | ICD-10-CM

## 2024-09-13 NOTE — TELEPHONE ENCOUNTER
Caller: Matias Hughes    Relationship to patient: Self    Best call back number: [phone]     Patient is needing: PATIENT HAS A RECALL FOR HIS 3 YEAR EGD AND WOULD LIKE TO SCHEDULE.  PLEASE CALL BACK TO SCHEDULE.

## 2024-09-16 RX ORDER — SODIUM CHLORIDE 0.9 % (FLUSH) 0.9 %
10 SYRINGE (ML) INJECTION AS NEEDED
OUTPATIENT
Start: 2024-09-16

## 2024-09-16 RX ORDER — SODIUM CHLORIDE 0.9 % (FLUSH) 0.9 %
3 SYRINGE (ML) INJECTION EVERY 12 HOURS SCHEDULED
OUTPATIENT
Start: 2024-09-16

## 2024-09-16 RX ORDER — SODIUM CHLORIDE, SODIUM LACTATE, POTASSIUM CHLORIDE, CALCIUM CHLORIDE 600; 310; 30; 20 MG/100ML; MG/100ML; MG/100ML; MG/100ML
30 INJECTION, SOLUTION INTRAVENOUS CONTINUOUS PRN
OUTPATIENT
Start: 2024-09-16

## 2024-10-14 NOTE — PROGRESS NOTES
.     REASONS FOR FOLLOWUP: Hemochromatosis    HISTORY OF PRESENT ILLNESS:  The patient is a 54 y.o. year old male  who is here for follow-up with the above-mentioned history.    Feeling fine.  No new issues.  No bleeding.    Past Medical History:   Diagnosis Date    Abdominal adhesions 02/15/2024    Acute prostatitis 10/11/2018    10/2018; on background of chronic prostatitis     Denis's esophagus with dysplasia 10/30/2017    On EGD 10/2017; repeat EGD in 10 weeks improved --> EGD 11/2018 per Dr. Meehan --> 10/2021 with no IM in esophagus but present in antrum --> 3 years    Cholelithiasis 2012    Diverticulosis     Diverticulosis of large intestine without hemorrhage 11/02/2018    Fatty liver 06/22/2017    Noted on 6/2017 CT and again on 6/2021 MRI abdomen 'liver demonstrates minimal loss of signal on the out of phase T1-weighted images most consistent with steatosis'    GERD (gastroesophageal reflux disease)     H/O hypogonadism     Hemochromatosis     heterozygous for C282Y mutation hx elevated LFT's Ferritin elevated at dx    History of abdominal surgery (maynor,VINOD 2014) 02/15/2024    History of cholelithiasis     History of chronic prostatitis     2010 tx'd by Dr Cooper    History of Clostridium difficile colitis     Hospitalized June 2013; with SBO    History of small bowel obstruction     related to adhesions from gallbladder surgery adhesions; 2013; 2015; 2016    History of small bowel obstruction 02/15/2024    related to adhesions from gallbladder surgery adhesions; 2013; 2015; 2016    Hypertension 2019    IFG (impaired fasting glucose) 06/09/2017    Normal A1C    Metabolic syndrome 09/03/2021    IFG, truncal obesity, low HDL, HTN, and fatty liver meets criteria for metabolic syndrome    Overweight     Peptic ulcer disease 11/17/2017    Prehypertension     Rosacea 06/02/2016    Squamous cell carcinoma of skin     2012 sees derm q 6 months (Dr. Mitchell)    Upper back pain, chronic 07/23/2019    Ventral  hernia without obstruction or gangrene 06/22/2017    Tiny, on CT 6/2017    Vitamin D deficiency      Past Surgical History:   Procedure Laterality Date    CHOLECYSTECTOMY  2013    COLONOSCOPY      ENDOSCOPY  2018    ENDOSCOPY N/A 10/29/2021    Procedure: ESOPHAGOGASTRODUODENOSCOPY WITH BIOPSY;  Surgeon: Steven Meehan MD;  [place] [facility] [place]  Service: Gastroenterology;  Laterality: N/A;    ENDOSCOPY AND COLONOSCOPY N/A 2017    LYSIS OF ABDOMINAL ADHESIONS  07/10/2014    after SBO    SINUS SURGERY  2007    persistent congestion ?? turbinate reduction    TONSILLECTOMY      1978    UPPER GASTROINTESTINAL ENDOSCOPY         MEDICATIONS    Current Outpatient Medications:     cholecalciferol (VITAMIN D3) 1000 units tablet, Take 1 tablet by mouth Daily., Disp: 30 tablet, Rfl: 5    methylcellulose, Laxative, (CITRUCEL) 500 MG tablet tablet, Take 2 tablets by mouth Every 4 (Four) Hours As Needed., Disp: , Rfl:     metroNIDAZOLE (Metrogel) 1 % gel, Apply  topically to the appropriate area as directed Daily., Disp: 60 g, Rfl: 2    omeprazole (priLOSEC) 40 MG capsule, TAKE 1 CAPSULE BY MOUTH DAILY, Disp: 90 capsule, Rfl: 3    pitavastatin calcium (LIVALO) 2 MG tablet tablet, Take 1 tablet by mouth Every Night., Disp: 90 tablet, Rfl: 2    polyethylene glycol (MIRALAX) 17 GM/SCOOP powder, Take 17 g by mouth Daily As Needed (Use if senna-docusate is ineffective)., Disp: 510 g, Rfl: 0    telmisartan (MICARDIS) 80 MG tablet, TAKE 1 TABLET BY MOUTH DAILY, Disp: 90 tablet, Rfl: 2    ALLERGIES:     Allergies   Allergen Reactions    Cefdinir Hives     Beta lactam allergy details  Antibiotic reaction: rash  Age at reaction: adult  Dose to reaction time: unknown  Reason for antibiotic: unknown  Epinephrine required for reaction?: no  Tolerated antibiotics: unknown            SOCIAL HISTORY:       Social History     Socioeconomic History    Marital status:      Spouse name: Shante    Number of children: 2    Years of  education: College   Tobacco Use    Smoking status: Never    Smokeless tobacco: Never   Vaping Use    Vaping status: Never Used   Substance and Sexual Activity    Alcohol use: Yes     Alcohol/week: 5.0 standard drinks of alcohol     Types: 5 Cans of beer per week     Comment: 2-5 drinks/ week    Drug use: No    Sexual activity: Yes     Partners: Female     Birth control/protection: None     Comment: wife only; no hx STD's         FAMILY HISTORY:  Family History   Problem Relation Age of Onset    Hypothyroidism Mother     Rheum arthritis Maternal Aunt     Breast cancer Maternal Aunt     Breast cancer Maternal Grandmother     Cancer Maternal Grandmother         Breast / Bone    Glaucoma Paternal Grandmother     Migraines Daughter     Seizures Daughter         Epilepsy    Migraines Son     Cancer Maternal Aunt     Colon cancer Neg Hx     Colon polyps Neg Hx     Malig Hyperthermia Neg Hx     Crohn's disease Neg Hx     Irritable bowel syndrome Neg Hx     Ulcerative colitis Neg Hx        REVIEW OF SYSTEMS:  Review of Systems   Constitutional:  Negative for activity change.   HENT:  Negative for nosebleeds and trouble swallowing.    Respiratory:  Negative for shortness of breath and wheezing.    Cardiovascular:  Negative for chest pain and palpitations.   Gastrointestinal:  Negative for constipation, diarrhea and nausea.   Genitourinary:  Negative for dysuria and hematuria.   Musculoskeletal:  Negative for arthralgias and myalgias.   Neurological:  Negative for seizures and syncope.   Hematological:  Negative for adenopathy. Does not bruise/bleed easily.   Psychiatric/Behavioral:  Negative for confusion.             There were no vitals filed for this visit.      2/27/2024     3:12 PM   Current Status   ECOG score 0        PHYSICAL EXAM:        CONSTITUTIONAL:  Vital signs reviewed.  No distress, looks comfortable.  EYES:  Conjunctiva and lids unremarkable.  PERRLA  EARS,NOSE,MOUTH,THROAT:  Ears and nose appear  unremarkable.  Lips, teeth, gums appear unremarkable.  RESPIRATORY:  Normal respiratory effort.  Lungs clear to auscultation bilaterally.  CARDIOVASCULAR:  Normal S1, S2.  No murmurs rubs or gallops.  No significant lower extremity edema.  GASTROINTESTINAL: Abdomen appears unremarkable.  Nontender.  No hepatomegaly.  No splenomegaly.  LYMPHATIC:  No cervical, supraclavicular, axillary lymphadenopathy.  SKIN:  Warm.  No rashes.  PSYCHIATRIC:  Normal judgment and insight.  Normal mood and affect.        RECENT LABS:        WBC   Date/Time Value Ref Range Status   06/18/2024 03:15 PM 4.95 3.40 - 10.80 10*3/mm3 Final   02/23/2024 11:11 AM 4.11 3.40 - 10.80 10*3/mm3 Final   01/25/2024 05:15 AM 4.59 4.5 - 11.0 10*3/uL Final     Hemoglobin   Date/Time Value Ref Range Status   06/18/2024 03:15 PM 13.2 13.0 - 17.7 g/dL Final   01/25/2024 05:15 AM 12.8 (L) 13.5 - 17.5 g/dL Final     Platelets   Date/Time Value Ref Range Status   06/18/2024 03:15  140 - 450 10*3/mm3 Final   01/25/2024 05:15  140 - 440 10*3/uL Final       Assessment & Plan   Hereditary hemochromatosis  - CBC & Differential  - Ferritin  - Iron Profile  - Retic With IRF & RET-He  - CBC & Differential        Matias SIMÓN Allredl   *Heterozygote for the C282Y hemochromatosis gene mutation  Gene mutation around February 2013  Initial ferritin 590 with mildly elevated LFTs  Dr. Noonan was phlebotomizing to keep ferritin around 50  MRI abdomen, ordered by Dr. Meehan, 5/19/2021: No iron deposition in the liver.  No evidence of cirrhosis.  10/15/2024: I explained since he is just a heterozygote for 1 gene it is unlikely to develop the clinical problem of hemochromatosis (but possible).  I explained the elevated ferritin could have been due to liver disease, especially considering mildly elevated LFTs and per GIs notes: Fatty liver.  I doubt he actually has the clinical problem of hemochromatosis.  However, since he tolerates phlebotomies well and we do not know if  he had any evidence of iron overload prior to beginning phlebotomies, he would like to continue phlebotomies but use a higher ferritin threshold of 100 instead of 50 and space out visits    Plan  Every 6-month: CBC and ferritin.   Phlebotomy if ferritin >100  MD lab phlebotomy 1 year  (He wants phlebotomies based on ferritin in order to avoid phlebotomies if ferritin is <100).    42 minutes.  Total time.  Same day.  Reviewed his records and reviewed hemochromatosis with him

## 2024-10-15 ENCOUNTER — OFFICE VISIT (OUTPATIENT)
Dept: ONCOLOGY | Facility: CLINIC | Age: 54
End: 2024-10-15
Payer: COMMERCIAL

## 2024-10-15 ENCOUNTER — INFUSION (OUTPATIENT)
Dept: ONCOLOGY | Facility: HOSPITAL | Age: 54
End: 2024-10-15
Payer: COMMERCIAL

## 2024-10-15 ENCOUNTER — LAB (OUTPATIENT)
Dept: OTHER | Facility: HOSPITAL | Age: 54
End: 2024-10-15
Payer: COMMERCIAL

## 2024-10-15 VITALS
SYSTOLIC BLOOD PRESSURE: 131 MMHG | HEART RATE: 101 BPM | TEMPERATURE: 98.7 F | HEIGHT: 71 IN | BODY MASS INDEX: 32.2 KG/M2 | WEIGHT: 230 LBS | OXYGEN SATURATION: 97 % | DIASTOLIC BLOOD PRESSURE: 79 MMHG

## 2024-10-15 DIAGNOSIS — E83.110 HEREDITARY HEMOCHROMATOSIS: ICD-10-CM

## 2024-10-15 DIAGNOSIS — E83.110 HEREDITARY HEMOCHROMATOSIS: Primary | ICD-10-CM

## 2024-10-15 LAB
BASOPHILS # BLD AUTO: 0.02 10*3/MM3 (ref 0–0.2)
BASOPHILS NFR BLD AUTO: 0.4 % (ref 0–1.5)
DEPRECATED RDW RBC AUTO: 45.5 FL (ref 37–54)
EOSINOPHIL # BLD AUTO: 0.06 10*3/MM3 (ref 0–0.4)
EOSINOPHIL NFR BLD AUTO: 1.2 % (ref 0.3–6.2)
ERYTHROCYTE [DISTWIDTH] IN BLOOD BY AUTOMATED COUNT: 13.9 % (ref 12.3–15.4)
FERRITIN SERPL-MCNC: 64.9 NG/ML (ref 30–400)
HCT VFR BLD AUTO: 43.9 % (ref 37.5–51)
HGB BLD-MCNC: 14.4 G/DL (ref 13–17.7)
HGB RETIC QN AUTO: 34.1 PG (ref 29.8–36.1)
IMM GRANULOCYTES # BLD AUTO: 0.01 10*3/MM3 (ref 0–0.05)
IMM GRANULOCYTES NFR BLD AUTO: 0.2 % (ref 0–0.5)
IMM RETICS NFR: 12.6 % (ref 3–15.8)
IRON 24H UR-MRATE: 167 MCG/DL (ref 59–158)
IRON SATN MFR SERPL: 35 % (ref 20–50)
LYMPHOCYTES # BLD AUTO: 1.39 10*3/MM3 (ref 0.7–3.1)
LYMPHOCYTES NFR BLD AUTO: 28.4 % (ref 19.6–45.3)
MCH RBC QN AUTO: 29.4 PG (ref 26.6–33)
MCHC RBC AUTO-ENTMCNC: 32.8 G/DL (ref 31.5–35.7)
MCV RBC AUTO: 89.8 FL (ref 79–97)
MONOCYTES # BLD AUTO: 0.33 10*3/MM3 (ref 0.1–0.9)
MONOCYTES NFR BLD AUTO: 6.7 % (ref 5–12)
NEUTROPHILS NFR BLD AUTO: 3.09 10*3/MM3 (ref 1.7–7)
NEUTROPHILS NFR BLD AUTO: 63.1 % (ref 42.7–76)
NRBC BLD AUTO-RTO: 0 /100 WBC (ref 0–0.2)
PLATELET # BLD AUTO: 171 10*3/MM3 (ref 140–450)
PMV BLD AUTO: 9.4 FL (ref 6–12)
RBC # BLD AUTO: 4.89 10*6/MM3 (ref 4.14–5.8)
RETICS # AUTO: 0.12 10*6/MM3 (ref 0.02–0.13)
RETICS/RBC NFR AUTO: 2.5 % (ref 0.7–1.9)
TIBC SERPL-MCNC: 477 MCG/DL (ref 298–536)
TRANSFERRIN SERPL-MCNC: 320 MG/DL (ref 200–360)
WBC NRBC COR # BLD AUTO: 4.9 10*3/MM3 (ref 3.4–10.8)

## 2024-10-15 PROCEDURE — 82728 ASSAY OF FERRITIN: CPT | Performed by: INTERNAL MEDICINE

## 2024-10-15 PROCEDURE — 83540 ASSAY OF IRON: CPT | Performed by: INTERNAL MEDICINE

## 2024-10-15 PROCEDURE — 85046 RETICYTE/HGB CONCENTRATE: CPT | Performed by: INTERNAL MEDICINE

## 2024-10-15 PROCEDURE — G0463 HOSPITAL OUTPT CLINIC VISIT: HCPCS

## 2024-10-15 PROCEDURE — 84466 ASSAY OF TRANSFERRIN: CPT | Performed by: INTERNAL MEDICINE

## 2024-10-15 PROCEDURE — 85025 COMPLETE CBC W/AUTO DIFF WBC: CPT | Performed by: INTERNAL MEDICINE

## 2024-10-15 PROCEDURE — 36415 COLL VENOUS BLD VENIPUNCTURE: CPT

## 2024-10-21 ENCOUNTER — ANESTHESIA EVENT (OUTPATIENT)
Dept: SURGERY | Facility: SURGERY CENTER | Age: 54
End: 2024-10-21
Payer: COMMERCIAL

## 2024-10-21 ENCOUNTER — HOSPITAL ENCOUNTER (OUTPATIENT)
Facility: SURGERY CENTER | Age: 54
Setting detail: HOSPITAL OUTPATIENT SURGERY
Discharge: HOME | End: 2024-10-21
Attending: INTERNAL MEDICINE | Admitting: INTERNAL MEDICINE
Payer: COMMERCIAL

## 2024-10-21 ENCOUNTER — ANESTHESIA (OUTPATIENT)
Dept: SURGERY | Facility: SURGERY CENTER | Age: 54
End: 2024-10-21
Payer: COMMERCIAL

## 2024-10-21 VITALS
OXYGEN SATURATION: 95 % | DIASTOLIC BLOOD PRESSURE: 79 MMHG | HEIGHT: 71 IN | BODY MASS INDEX: 32.2 KG/M2 | HEART RATE: 88 BPM | RESPIRATION RATE: 16 BRPM | SYSTOLIC BLOOD PRESSURE: 113 MMHG | TEMPERATURE: 97.7 F | WEIGHT: 230 LBS

## 2024-10-21 DIAGNOSIS — K22.719 BARRETT'S ESOPHAGUS WITH DYSPLASIA: ICD-10-CM

## 2024-10-21 PROCEDURE — 88313 SPECIAL STAINS GROUP 2: CPT | Performed by: INTERNAL MEDICINE

## 2024-10-21 PROCEDURE — 25010000002 LIDOCAINE 2% SOLUTION: Performed by: NURSE ANESTHETIST, CERTIFIED REGISTERED

## 2024-10-21 PROCEDURE — 25010000002 GLYCOPYRROLATE 0.2 MG/ML SOLUTION: Performed by: NURSE ANESTHETIST, CERTIFIED REGISTERED

## 2024-10-21 PROCEDURE — 25010000002 PROPOFOL 10 MG/ML EMULSION: Performed by: NURSE ANESTHETIST, CERTIFIED REGISTERED

## 2024-10-21 PROCEDURE — 25810000003 LACTATED RINGERS PER 1000 ML: Performed by: INTERNAL MEDICINE

## 2024-10-21 PROCEDURE — 88305 TISSUE EXAM BY PATHOLOGIST: CPT | Performed by: INTERNAL MEDICINE

## 2024-10-21 PROCEDURE — 43239 EGD BIOPSY SINGLE/MULTIPLE: CPT | Performed by: INTERNAL MEDICINE

## 2024-10-21 RX ORDER — PROPOFOL 10 MG/ML
VIAL (ML) INTRAVENOUS CONTINUOUS PRN
Status: DISCONTINUED | OUTPATIENT
Start: 2024-10-21 | End: 2024-10-21 | Stop reason: SURG

## 2024-10-21 RX ORDER — GLYCOPYRROLATE 0.2 MG/ML
INJECTION INTRAMUSCULAR; INTRAVENOUS AS NEEDED
Status: DISCONTINUED | OUTPATIENT
Start: 2024-10-21 | End: 2024-10-21 | Stop reason: SURG

## 2024-10-21 RX ORDER — SODIUM CHLORIDE, SODIUM LACTATE, POTASSIUM CHLORIDE, CALCIUM CHLORIDE 600; 310; 30; 20 MG/100ML; MG/100ML; MG/100ML; MG/100ML
30 INJECTION, SOLUTION INTRAVENOUS CONTINUOUS PRN
Status: DISCONTINUED | OUTPATIENT
Start: 2024-10-21 | End: 2024-10-21 | Stop reason: HOSPADM

## 2024-10-21 RX ORDER — SODIUM CHLORIDE 0.9 % (FLUSH) 0.9 %
3 SYRINGE (ML) INJECTION EVERY 12 HOURS SCHEDULED
Status: DISCONTINUED | OUTPATIENT
Start: 2024-10-21 | End: 2024-10-21 | Stop reason: HOSPADM

## 2024-10-21 RX ORDER — LIDOCAINE HYDROCHLORIDE 20 MG/ML
INJECTION, SOLUTION INFILTRATION; PERINEURAL AS NEEDED
Status: DISCONTINUED | OUTPATIENT
Start: 2024-10-21 | End: 2024-10-21 | Stop reason: SURG

## 2024-10-21 RX ORDER — SODIUM CHLORIDE 0.9 % (FLUSH) 0.9 %
10 SYRINGE (ML) INJECTION AS NEEDED
Status: DISCONTINUED | OUTPATIENT
Start: 2024-10-21 | End: 2024-10-21 | Stop reason: HOSPADM

## 2024-10-21 RX ADMIN — GLYCOPYRROLATE 0.2 MG: 0.2 INJECTION, SOLUTION INTRAMUSCULAR; INTRAVENOUS at 09:39

## 2024-10-21 RX ADMIN — LIDOCAINE HYDROCHLORIDE 50 MG: 20 INJECTION, SOLUTION INFILTRATION; PERINEURAL at 09:39

## 2024-10-21 RX ADMIN — SODIUM CHLORIDE, POTASSIUM CHLORIDE, SODIUM LACTATE AND CALCIUM CHLORIDE: 600; 310; 30; 20 INJECTION, SOLUTION INTRAVENOUS at 09:36

## 2024-10-21 RX ADMIN — PROPOFOL 180 MCG/KG/MIN: 10 INJECTION, EMULSION INTRAVENOUS at 09:39

## 2024-10-21 RX ADMIN — PROPOFOL 100 MG: 10 INJECTION, EMULSION INTRAVENOUS at 09:40

## 2024-10-21 NOTE — ANESTHESIA POSTPROCEDURE EVALUATION
Augmentin 875-125 mg BID x 7 days (OK if breastfeeding)  Prednisone 40 mg x 7 days  Continue inhalers/nebulizers. If getting worse despite treatment will need to be seen.    Patient: Matias Hughes    Procedure Summary       Date: 10/21/24 Room / Location: [facility]  / [facility]    Anesthesia Start: 0936 Anesthesia Stop: 0952    Procedure: ESOPHAGOGASTRODUODENOSCOPY AND BIOPSIES Diagnosis:       Denis's esophagus with dysplasia      (Denis's esophagus with dysplasia [K22.719])    Surgeons: Steven Meehan MD Provider: Tone Patton MD    Anesthesia Type: MAC ASA Status: 2            Anesthesia Type: MAC    Vitals  Vitals Value Taken Time   /80 10/21/24 1011   Temp 36.5 °C (97.7 °F) 10/21/24 0952   Pulse 74 10/21/24 1009   Resp 16 10/21/24 1000   SpO2 96 % 10/21/24 1006   Vitals shown include unfiled device data.        Post Anesthesia Care and Evaluation    Patient location during evaluation: bedside  Patient participation: complete - patient participated  Level of consciousness: awake and alert  Pain management: adequate    Airway patency: patent  Anesthetic complications: No anesthetic complications  PONV Status: controlled  Cardiovascular status: blood pressure returned to baseline and acceptable  Respiratory status: acceptable  Hydration status: acceptable

## 2024-10-21 NOTE — ANESTHESIA PREPROCEDURE EVALUATION
Anesthesia Evaluation     Patient summary reviewed and Nursing notes reviewed   no history of anesthetic complications:   NPO Solid Status: > 8 hours  NPO Liquid Status: > 2 hours           Airway   Mallampati: II  TM distance: >3 FB  Neck ROM: full  Dental      Pulmonary    Cardiovascular     (+) hypertension      Neuro/Psych  GI/Hepatic/Renal/Endo    (+) obesity, GERD, PUD, liver disease    Musculoskeletal     Abdominal    Substance History      OB/GYN          Other                    Anesthesia Plan    ASA 2     MAC     intravenous induction     Anesthetic plan, risks, benefits, and alternatives have been provided, discussed and informed consent has been obtained with: patient.    CODE STATUS:

## 2024-10-21 NOTE — H&P
No chief complaint on file.      HPI  barretts         Problem List:    Patient Active Problem List   Diagnosis    Hemochromatosis    Squamous cell carcinoma of skin    Avitaminosis D    Rosacea    Dyslipidemia    IFG (impaired fasting glucose)    Obesity (BMI 30-39.9)    Fatty liver    Ventral hernia without obstruction or gangrene    Denis's esophagus with dysplasia    Peptic ulcer disease    Diverticulosis of large intestine without hemorrhage    Upper back pain, chronic    Essential hypertension    Gastroesophageal reflux disease    Metabolic syndrome    SBO (small bowel obstruction)    History of abdominal surgery (maynorYASIRA 2014)    Abdominal adhesions    History of small bowel obstruction 2013; 2015; 2016, 2023       Medical History:    Past Medical History:   Diagnosis Date    Abdominal adhesions 02/15/2024    Acute prostatitis 10/11/2018    10/2018; on background of chronic prostatitis     Denis's esophagus with dysplasia 10/30/2017    On EGD 10/2017; repeat EGD in 10 weeks improved --> EGD 11/2018 per Dr. Meehan --> 10/2021 with no IM in esophagus but present in antrum --> 3 years    Cholelithiasis 2012    Diverticulosis     Diverticulosis of large intestine without hemorrhage 11/02/2018    Fatty liver 06/22/2017    Noted on 6/2017 CT and again on 6/2021 MRI abdomen 'liver demonstrates minimal loss of signal on the out of phase T1-weighted images most consistent with steatosis'    GERD (gastroesophageal reflux disease)     H/O hypogonadism     Hemochromatosis     heterozygous for C282Y mutation hx elevated LFT's Ferritin elevated at dx    History of abdominal surgery (YASIR mcguireA 2014) 02/15/2024    History of cholelithiasis     History of chronic prostatitis     2010 tx'd by Dr Cooper    History of Clostridium difficile colitis     Hospitalized June 2013; with SBO    History of small bowel obstruction     related to adhesions from gallbladder surgery adhesions; 2013; 2015; 2016    History of small bowel  obstruction 02/15/2024    related to adhesions from gallbladder surgery adhesions; 2013; 2015; 2016    Hypertension 2019    IFG (impaired fasting glucose) 06/09/2017    Normal A1C    Metabolic syndrome 09/03/2021    IFG, truncal obesity, low HDL, HTN, and fatty liver meets criteria for metabolic syndrome    Overweight     Peptic ulcer disease 11/17/2017    Prehypertension     Rosacea 06/02/2016    Squamous cell carcinoma of skin     2012 sees derm q 6 months (Dr. Mitchell)    Upper back pain, chronic 07/23/2019    Ventral hernia without obstruction or gangrene 06/22/2017    Tiny, on CT 6/2017    Vitamin D deficiency         Social History:    Social History     Socioeconomic History    Marital status:      Spouse name: Shante    Number of children: 2    Years of education: College   Tobacco Use    Smoking status: Never    Smokeless tobacco: Never   Vaping Use    Vaping status: Never Used   Substance and Sexual Activity    Alcohol use: Yes     Alcohol/week: 5.0 standard drinks of alcohol     Types: 5 Cans of beer per week     Comment: 2-5 drinks/ week    Drug use: No    Sexual activity: Yes     Partners: Female     Birth control/protection: None     Comment: wife only; no hx STD's       Family History:   Family History   Problem Relation Age of Onset    Hypothyroidism Mother     Rheum arthritis Maternal Aunt     Breast cancer Maternal Aunt     Breast cancer Maternal Grandmother     Cancer Maternal Grandmother         Breast / Bone    Glaucoma Paternal Grandmother     Migraines Daughter     Seizures Daughter         Epilepsy    Migraines Son     Cancer Maternal Aunt     Colon cancer Neg Hx     Colon polyps Neg Hx     Malig Hyperthermia Neg Hx     Crohn's disease Neg Hx     Irritable bowel syndrome Neg Hx     Ulcerative colitis Neg Hx        Surgical History:   Past Surgical History:   Procedure Laterality Date    CHOLECYSTECTOMY  2013    COLONOSCOPY      ENDOSCOPY  2018    ENDOSCOPY N/A 10/29/2021    Procedure:  ESOPHAGOGASTRODUODENOSCOPY WITH BIOPSY;  Surgeon: Steven Meehan MD;  Location: [facility] [place] [facility];  Service: Gastroenterology;  Laterality: N/A;    ENDOSCOPY AND COLONOSCOPY N/A 2017    LYSIS OF ABDOMINAL ADHESIONS  07/10/2014    after SBO    SINUS SURGERY  2007    persistent congestion ?? turbinate reduction    TONSILLECTOMY      1978    UPPER GASTROINTESTINAL ENDOSCOPY         No current facility-administered medications for this encounter.    Current Outpatient Medications:     cholecalciferol (VITAMIN D3) 1000 units tablet, Take 1 tablet by mouth Daily., Disp: 30 tablet, Rfl: 5    methylcellulose, Laxative, (CITRUCEL) 500 MG tablet tablet, Take 2 tablets by mouth Every 4 (Four) Hours As Needed., Disp: , Rfl:     metroNIDAZOLE (Metrogel) 1 % gel, Apply  topically to the appropriate area as directed Daily., Disp: 60 g, Rfl: 2    omeprazole (priLOSEC) 40 MG capsule, TAKE 1 CAPSULE BY MOUTH DAILY, Disp: 90 capsule, Rfl: 3    pitavastatin calcium (LIVALO) 2 MG tablet tablet, Take 1 tablet by mouth Every Night., Disp: 90 tablet, Rfl: 2    polyethylene glycol (MIRALAX) 17 GM/SCOOP powder, Take 17 g by mouth Daily As Needed (Use if senna-docusate is ineffective)., Disp: 510 g, Rfl: 0    telmisartan (MICARDIS) 80 MG tablet, TAKE 1 TABLET BY MOUTH DAILY, Disp: 90 tablet, Rfl: 2    Allergies:   Allergies   Allergen Reactions    Cefdinir Hives     Beta lactam allergy details  Antibiotic reaction: rash  Age at reaction: adult  Dose to reaction time: unknown  Reason for antibiotic: unknown  Epinephrine required for reaction?: no  Tolerated antibiotics: unknown             The following portions of the patient's history were reviewed by me and updated as appropriate: review of systems, allergies, current medications, past family history, past medical history, past social history, past surgical history and problem list.    There were no vitals filed for this visit.    PHYSICAL EXAM:    CONSTITUTIONAL:  today's vital signs  reviewed by me  GASTROINTESTINAL: abdomen is soft nontender nondistended with normal active bowel sounds, no masses are appreciated    Assessment/ Plan  Barretts    egd    Risks and benefits as well as alternatives to endoscopic evaluation were explained to the patient and they voiced understanding and wish to proceed.  These risks include but are not limited to the risk of bleeding, perforation, adverse reaction to sedation, and missed lesions.  The patient was given the opportunity to ask questions prior to the endoscopic procedure.

## 2024-10-25 LAB
CYTO UR: NORMAL
LAB AP CASE REPORT: NORMAL
LAB AP CLINICAL INFORMATION: NORMAL
LAB AP SPECIAL STAINS: NORMAL
PATH REPORT.FINAL DX SPEC: NORMAL
PATH REPORT.GROSS SPEC: NORMAL

## 2025-01-27 RX ORDER — PITAVASTATIN CALCIUM 2.09 MG/1
2 TABLET, FILM COATED ORAL NIGHTLY
Qty: 90 TABLET | Refills: 2 | OUTPATIENT
Start: 2025-01-27

## 2025-01-29 RX ORDER — PITAVASTATIN CALCIUM 2.09 MG/1
2 TABLET, FILM COATED ORAL NIGHTLY
Qty: 90 TABLET | Refills: 2 | Status: SHIPPED | OUTPATIENT
Start: 2025-01-29

## 2025-01-29 NOTE — TELEPHONE ENCOUNTER
[facility] pharmacy has sent a new Rx request for this patient, this not a new prescription it just need to be refilled.RH

## 2025-02-10 RX ORDER — PITAVASTATIN CALCIUM 2.09 MG/1
2 TABLET, FILM COATED ORAL NIGHTLY
Qty: 90 TABLET | Refills: 0 | Status: SHIPPED | OUTPATIENT
Start: 2025-02-10

## 2025-02-10 NOTE — TELEPHONE ENCOUNTER
Please call patient.  Overdue for visit in office.  Please schedule for annual exam with F labs prior.  I refilled 90 days of his medication; needs office evaluation for additional fills

## 2025-03-17 DIAGNOSIS — I10 ESSENTIAL HYPERTENSION: ICD-10-CM

## 2025-03-17 RX ORDER — TELMISARTAN 80 MG/1
80 TABLET ORAL DAILY
Qty: 90 TABLET | Refills: 2 | OUTPATIENT
Start: 2025-03-17

## 2025-04-15 ENCOUNTER — INFUSION (OUTPATIENT)
Dept: ONCOLOGY | Facility: HOSPITAL | Age: 55
End: 2025-04-15
Payer: COMMERCIAL

## 2025-04-15 ENCOUNTER — LAB (OUTPATIENT)
Dept: OTHER | Facility: HOSPITAL | Age: 55
End: 2025-04-15
Payer: COMMERCIAL

## 2025-04-15 VITALS
DIASTOLIC BLOOD PRESSURE: 74 MMHG | HEIGHT: 71 IN | HEART RATE: 76 BPM | TEMPERATURE: 97.4 F | SYSTOLIC BLOOD PRESSURE: 107 MMHG | WEIGHT: 234 LBS | BODY MASS INDEX: 32.76 KG/M2 | RESPIRATION RATE: 15 BRPM | OXYGEN SATURATION: 98 %

## 2025-04-15 DIAGNOSIS — E83.110 HEREDITARY HEMOCHROMATOSIS: ICD-10-CM

## 2025-04-15 LAB
BASOPHILS # BLD AUTO: 0.02 10*3/MM3 (ref 0–0.2)
BASOPHILS NFR BLD AUTO: 0.4 % (ref 0–1.5)
DEPRECATED RDW RBC AUTO: 45.2 FL (ref 37–54)
EOSINOPHIL # BLD AUTO: 0.05 10*3/MM3 (ref 0–0.4)
EOSINOPHIL NFR BLD AUTO: 1 % (ref 0.3–6.2)
ERYTHROCYTE [DISTWIDTH] IN BLOOD BY AUTOMATED COUNT: 14.1 % (ref 12.3–15.4)
FERRITIN SERPL-MCNC: 79 NG/ML (ref 30–400)
HCT VFR BLD AUTO: 43.2 % (ref 37.5–51)
HGB BLD-MCNC: 15.1 G/DL (ref 13–17.7)
IMM GRANULOCYTES # BLD AUTO: 0.04 10*3/MM3 (ref 0–0.05)
IMM GRANULOCYTES NFR BLD AUTO: 0.8 % (ref 0–0.5)
LYMPHOCYTES # BLD AUTO: 1.72 10*3/MM3 (ref 0.7–3.1)
LYMPHOCYTES NFR BLD AUTO: 33.1 % (ref 19.6–45.3)
MCH RBC QN AUTO: 30.8 PG (ref 26.6–33)
MCHC RBC AUTO-ENTMCNC: 35 G/DL (ref 31.5–35.7)
MCV RBC AUTO: 88.2 FL (ref 79–97)
MONOCYTES # BLD AUTO: 0.37 10*3/MM3 (ref 0.1–0.9)
MONOCYTES NFR BLD AUTO: 7.1 % (ref 5–12)
NEUTROPHILS NFR BLD AUTO: 2.99 10*3/MM3 (ref 1.7–7)
NEUTROPHILS NFR BLD AUTO: 57.6 % (ref 42.7–76)
NRBC BLD AUTO-RTO: 0 /100 WBC (ref 0–0.2)
PLATELET # BLD AUTO: 182 10*3/MM3 (ref 140–450)
PMV BLD AUTO: 9.4 FL (ref 6–12)
RBC # BLD AUTO: 4.9 10*6/MM3 (ref 4.14–5.8)
WBC NRBC COR # BLD AUTO: 5.19 10*3/MM3 (ref 3.4–10.8)

## 2025-04-15 PROCEDURE — 82728 ASSAY OF FERRITIN: CPT | Performed by: INTERNAL MEDICINE

## 2025-04-15 PROCEDURE — 36415 COLL VENOUS BLD VENIPUNCTURE: CPT

## 2025-04-15 PROCEDURE — G0463 HOSPITAL OUTPT CLINIC VISIT: HCPCS

## 2025-04-15 PROCEDURE — 85025 COMPLETE CBC W/AUTO DIFF WBC: CPT | Performed by: INTERNAL MEDICINE

## 2025-04-15 NOTE — NURSING NOTE
Pt to infusion room for possible phlebo .  Ferritin 79 today.   Per MD parameters , do phlebo for ferritin > 100  No phlebo required today.  Copy of labs given to pt and next appt reviewed with pt  Pt V/U  Pt discharged home stable

## 2025-04-18 ENCOUNTER — TELEPHONE (OUTPATIENT)
Dept: INTERNAL MEDICINE | Facility: CLINIC | Age: 55
End: 2025-04-18
Payer: COMMERCIAL

## 2025-04-18 NOTE — TELEPHONE ENCOUNTER
Caller: Matias Hughes    Relationship: Self    Best call back number: [phone]    What orders are you requesting (i.e. lab or imaging): LABS    In what timeframe would the patient need to come in: BEFORE 8/5    Where will you receive your lab/imaging services:     Additional notes: ASKING IF HE NEEDS TO GET LABS DRAWN BEFORE HE IS SEEN FOR HIS PHYSICAL.

## 2025-05-05 DIAGNOSIS — K76.0 FATTY LIVER: ICD-10-CM

## 2025-05-05 DIAGNOSIS — Z00.00 ANNUAL PHYSICAL EXAM: Primary | ICD-10-CM

## 2025-05-05 DIAGNOSIS — E55.9 AVITAMINOSIS D: ICD-10-CM

## 2025-05-05 DIAGNOSIS — I10 ESSENTIAL HYPERTENSION: ICD-10-CM

## 2025-05-05 DIAGNOSIS — Z12.5 SPECIAL SCREENING FOR MALIGNANT NEOPLASM OF PROSTATE: ICD-10-CM

## 2025-05-05 DIAGNOSIS — E88.810 METABOLIC SYNDROME: ICD-10-CM

## 2025-05-05 DIAGNOSIS — E78.5 DYSLIPIDEMIA: ICD-10-CM

## 2025-05-05 DIAGNOSIS — Z13.29 SCREENING FOR THYROID DISORDER: ICD-10-CM

## 2025-05-05 DIAGNOSIS — N17.9 ACUTE KIDNEY INJURY: ICD-10-CM

## 2025-05-05 DIAGNOSIS — R73.01 IFG (IMPAIRED FASTING GLUCOSE): ICD-10-CM

## 2025-05-07 LAB
25(OH)D3+25(OH)D2 SERPL-MCNC: 22 NG/ML (ref 30–100)
ALBUMIN SERPL-MCNC: 4.7 G/DL (ref 3.5–5.2)
ALBUMIN/GLOB SERPL: 1.8 G/DL
ALP SERPL-CCNC: 71 U/L (ref 39–117)
ALT SERPL-CCNC: 35 U/L (ref 1–41)
APPEARANCE UR: CLEAR
AST SERPL-CCNC: 24 U/L (ref 1–40)
BACTERIA #/AREA URNS HPF: NORMAL /[HPF]
BASOPHILS # BLD AUTO: 0.02 10*3/MM3 (ref 0–0.2)
BASOPHILS NFR BLD AUTO: 0.4 % (ref 0–1.5)
BILIRUB SERPL-MCNC: 0.7 MG/DL (ref 0–1.2)
BILIRUB UR QL STRIP: NEGATIVE
BUN SERPL-MCNC: 17 MG/DL (ref 6–20)
BUN/CREAT SERPL: 16 (ref 7–25)
CALCIUM SERPL-MCNC: 9.3 MG/DL (ref 8.6–10.5)
CASTS URNS QL MICRO: NORMAL /LPF
CHLORIDE SERPL-SCNC: 101 MMOL/L (ref 98–107)
CHOLEST SERPL-MCNC: 118 MG/DL (ref 0–200)
CHOLEST/HDLC SERPL: 3.58 {RATIO}
CO2 SERPL-SCNC: 26 MMOL/L (ref 22–29)
COLOR UR: YELLOW
CREAT SERPL-MCNC: 1.06 MG/DL (ref 0.76–1.27)
EGFRCR SERPLBLD CKD-EPI 2021: 82.4 ML/MIN/1.73
EOSINOPHIL # BLD AUTO: 0.14 10*3/MM3 (ref 0–0.4)
EOSINOPHIL NFR BLD AUTO: 3.1 % (ref 0.3–6.2)
EPI CELLS #/AREA URNS HPF: NORMAL /HPF (ref 0–10)
ERYTHROCYTE [DISTWIDTH] IN BLOOD BY AUTOMATED COUNT: 13.4 % (ref 12.3–15.4)
GLOBULIN SER CALC-MCNC: 2.6 GM/DL
GLUCOSE SERPL-MCNC: 125 MG/DL (ref 65–99)
GLUCOSE UR QL STRIP: NEGATIVE
HBA1C MFR BLD: 5.3 % (ref 4.8–5.6)
HCT VFR BLD AUTO: 44.8 % (ref 37.5–51)
HDLC SERPL-MCNC: 33 MG/DL (ref 40–60)
HGB BLD-MCNC: 14.8 G/DL (ref 13–17.7)
HGB UR QL STRIP: NEGATIVE
IMM GRANULOCYTES # BLD AUTO: 0.01 10*3/MM3 (ref 0–0.05)
IMM GRANULOCYTES NFR BLD AUTO: 0.2 % (ref 0–0.5)
KETONES UR QL STRIP: ABNORMAL
LDLC SERPL CALC-MCNC: 60 MG/DL (ref 0–100)
LEUKOCYTE ESTERASE UR QL STRIP: NEGATIVE
LYMPHOCYTES # BLD AUTO: 1.72 10*3/MM3 (ref 0.7–3.1)
LYMPHOCYTES NFR BLD AUTO: 37.8 % (ref 19.6–45.3)
MCH RBC QN AUTO: 30.3 PG (ref 26.6–33)
MCHC RBC AUTO-ENTMCNC: 33 G/DL (ref 31.5–35.7)
MCV RBC AUTO: 91.8 FL (ref 79–97)
MICRO URNS: ABNORMAL
MICRO URNS: ABNORMAL
MONOCYTES # BLD AUTO: 0.44 10*3/MM3 (ref 0.1–0.9)
MONOCYTES NFR BLD AUTO: 9.7 % (ref 5–12)
NEUTROPHILS # BLD AUTO: 2.22 10*3/MM3 (ref 1.7–7)
NEUTROPHILS NFR BLD AUTO: 48.8 % (ref 42.7–76)
NITRITE UR QL STRIP: NEGATIVE
NRBC BLD AUTO-RTO: 0 /100 WBC (ref 0–0.2)
PH UR STRIP: 5.5 [PH] (ref 5–7.5)
PLATELET # BLD AUTO: 155 10*3/MM3 (ref 140–450)
POTASSIUM SERPL-SCNC: 4.9 MMOL/L (ref 3.5–5.2)
PROT SERPL-MCNC: 7.3 G/DL (ref 6–8.5)
PROT UR QL STRIP: ABNORMAL
PSA SERPL-MCNC: 0.45 NG/ML (ref 0–4)
RBC # BLD AUTO: 4.88 10*6/MM3 (ref 4.14–5.8)
RBC #/AREA URNS HPF: NORMAL /HPF (ref 0–2)
SODIUM SERPL-SCNC: 137 MMOL/L (ref 136–145)
SP GR UR STRIP: 1.03 (ref 1–1.03)
TRIGL SERPL-MCNC: 139 MG/DL (ref 0–150)
TSH SERPL DL<=0.005 MIU/L-ACNC: 1.71 UIU/ML (ref 0.27–4.2)
URINALYSIS REFLEX: ABNORMAL
UROBILINOGEN UR STRIP-MCNC: 1 MG/DL (ref 0.2–1)
VLDLC SERPL CALC-MCNC: 25 MG/DL (ref 5–40)
WBC # BLD AUTO: 4.55 10*3/MM3 (ref 3.4–10.8)
WBC #/AREA URNS HPF: NORMAL /HPF (ref 0–5)

## 2025-05-08 ENCOUNTER — OFFICE VISIT (OUTPATIENT)
Dept: INTERNAL MEDICINE | Facility: CLINIC | Age: 55
End: 2025-05-08
Payer: COMMERCIAL

## 2025-05-08 VITALS
BODY MASS INDEX: 32.84 KG/M2 | HEART RATE: 85 BPM | SYSTOLIC BLOOD PRESSURE: 124 MMHG | HEIGHT: 71 IN | DIASTOLIC BLOOD PRESSURE: 72 MMHG | WEIGHT: 234.6 LBS | OXYGEN SATURATION: 98 % | TEMPERATURE: 98.2 F

## 2025-05-08 DIAGNOSIS — R73.01 IFG (IMPAIRED FASTING GLUCOSE): ICD-10-CM

## 2025-05-08 DIAGNOSIS — K76.0 FATTY LIVER: ICD-10-CM

## 2025-05-08 DIAGNOSIS — C44.92 SQUAMOUS CELL CARCINOMA OF SKIN: ICD-10-CM

## 2025-05-08 DIAGNOSIS — E88.810 METABOLIC SYNDROME: ICD-10-CM

## 2025-05-08 DIAGNOSIS — Z87.19 HISTORY OF SMALL BOWEL OBSTRUCTION: Chronic | ICD-10-CM

## 2025-05-08 DIAGNOSIS — K22.719 BARRETT'S ESOPHAGUS WITH DYSPLASIA: ICD-10-CM

## 2025-05-08 DIAGNOSIS — K21.9 GASTROESOPHAGEAL REFLUX DISEASE WITHOUT ESOPHAGITIS: ICD-10-CM

## 2025-05-08 DIAGNOSIS — Z23 NEED FOR VACCINATION AGAINST STREPTOCOCCUS PNEUMONIAE: ICD-10-CM

## 2025-05-08 DIAGNOSIS — E66.9 OBESITY (BMI 30-39.9): ICD-10-CM

## 2025-05-08 DIAGNOSIS — I10 ESSENTIAL HYPERTENSION: ICD-10-CM

## 2025-05-08 DIAGNOSIS — K66.0 ABDOMINAL ADHESIONS: Chronic | ICD-10-CM

## 2025-05-08 DIAGNOSIS — Z00.01 ENCOUNTER FOR GENERAL ADULT MEDICAL EXAMINATION WITH ABNORMAL FINDINGS: Primary | ICD-10-CM

## 2025-05-08 DIAGNOSIS — L71.9 ROSACEA: ICD-10-CM

## 2025-05-08 DIAGNOSIS — E78.5 DYSLIPIDEMIA: ICD-10-CM

## 2025-05-08 DIAGNOSIS — E55.9 AVITAMINOSIS D: ICD-10-CM

## 2025-05-08 DIAGNOSIS — E83.110 HEREDITARY HEMOCHROMATOSIS: ICD-10-CM

## 2025-05-08 PROBLEM — K56.609 SBO (SMALL BOWEL OBSTRUCTION): Status: RESOLVED | Noted: 2024-02-15 | Resolved: 2025-05-08

## 2025-05-08 NOTE — PROGRESS NOTES
"Annual Exam      HPI  Matias Hughes is a 54 y.o. male RTC in yearly CPE, review of medical issues: has been doing well. Mild 'cold right now'.  Cough and runny nose. No fevers. Feeling better already.,   1. SBO -  inpatient stay 2/15-2/17/24 for progressive SBO symptoms and x-ray evidence of SBO at GI appointment.  No issues since last visit.  Has changed fiber to metamucil powder and 'seems to work better'.  No issues with bowels.   2. HTN, new dx in 2019 - controlled telmisartan 80mg daily --> 40mg since last visit given lower pressures inpt. 'I have stayed with that'.  Good numbers on home log. Occasional home log.   3. IFG/ Obesity,  Dx metabolic syndrome - stable weight. \"Has been ' alitlte less active over last year' with elbow surgery and work stress.  Diet stable.   4. Dyslipidemia, low HDL; Metabolic Syndrome and past high average hsCRP; did not tolerate Atorvastatin due muscle aching and myalgias - on Livalo, good tolerance. Worried well as has some toe and feet cramping.   5. Hemachromatosis - phlebotomy ~ 3-4x/ year in past with goal ferritin < 50 with Heme.  Saw ~3 weeks and not had phlebotomy in ~1 1/2 years.  6. Vitamin D deficiency - on 1000 I.U. Vitamin D3 OTC.   7. Squamous Cell CA of skin ~2014 - sees derm q 6 months, no new skin CA dx over year. Had bx recently last month, path was benign.  Had some cryo on lesions.   8. Rosacea, off minocycline for years - Rare recurrence. MetroGel PRN flares,'maybe once a year'.     Review of Systems   Constitutional: Negative for chills and fever.   HENT:  Positive for congestion (wtih recent cold, improving). Negative for hearing loss, odynophagia and sore throat.    Eyes:  Negative for discharge, double vision, pain and redness.        Last eye exam ~1 week ago; wears contacts     Cardiovascular:  Negative for chest pain, dyspnea on exertion, irregular heartbeat, leg swelling, near-syncope, palpitations and syncope.   Respiratory:  Positive for cough (wtih " recent cold, improving). Negative for shortness of breath.    Endocrine: Negative for polydipsia, polyphagia and polyuria.   Hematologic/Lymphatic: Negative for bleeding problem. Does not bruise/bleed easily.   Skin:  Positive for suspicious lesions ('bump on L side of neck under skn', noted over last year.  Occasional discomfort). Negative for poor wound healing (scab on bx site from derm.) and rash.   Musculoskeletal:  Positive for muscle cramps (toes/ feet, variable). Negative for joint pain, joint swelling, muscle weakness and myalgias.   Gastrointestinal:  Negative for constipation, diarrhea, dysphagia, heartburn, nausea and vomiting.   Genitourinary:  Negative for bladder incontinence, dysuria, frequency, hematuria, hesitancy and incomplete emptying.   Neurological:  Negative for dizziness, headaches and light-headedness.   Psychiatric/Behavioral:  Negative for depression. The patient does not have insomnia and is not nervous/anxious.    Allergic/Immunologic: Negative for environmental allergies and persistent infections.       Problem List:    Patient Active Problem List   Diagnosis    Hemochromatosis    Squamous cell carcinoma of skin    Avitaminosis D    Rosacea    Dyslipidemia    IFG (impaired fasting glucose)    Obesity (BMI 30-39.9)    Fatty liver    Ventral hernia without obstruction or gangrene    Denis's esophagus with dysplasia    Peptic ulcer disease    Diverticulosis of large intestine without hemorrhage    Upper back pain, chronic    Essential hypertension    Gastroesophageal reflux disease    Metabolic syndrome    History of abdominal surgery (VINOD mcguire 2014)    Abdominal adhesions    History of small bowel obstruction 2013; 2015; 2016, 2023       Medical History:    Past Medical History:   Diagnosis Date    Abdominal adhesions 02/15/2024    Acute prostatitis 10/11/2018    10/2018; on background of chronic prostatitis     Denis's esophagus with dysplasia 10/30/2017    On EGD 10/2017; repeat  EGD in 10 weeks improved --> EGD 11/2018 per Dr. Meehan --> 10/2021 with no IM in esophagus but present in antrum --> 3 years    Cholelithiasis 2012    Diverticulosis     Diverticulosis of large intestine without hemorrhage 11/02/2018    Fatty liver 06/22/2017    Noted on 6/2017 CT and again on 6/2021 MRI abdomen 'liver demonstrates minimal loss of signal on the out of phase T1-weighted images most consistent with steatosis'    GERD (gastroesophageal reflux disease)     H/O hypogonadism     Hemochromatosis     heterozygous for C282Y mutation hx elevated LFT's Ferritin elevated at dx    History of abdominal surgery (maynor,VINOD 2014) 02/15/2024    History of cholelithiasis     History of chronic prostatitis     2010 tx'd by Dr Cooper    History of Clostridium difficile colitis     Hospitalized June 2013; with SBO    History of small bowel obstruction     related to adhesions from gallbladder surgery adhesions; 2013; 2015; 2016    History of small bowel obstruction 02/15/2024    related to adhesions from gallbladder surgery adhesions; 2013; 2015; 2016    Hypertension 2019    IFG (impaired fasting glucose) 06/09/2017    Normal A1C    Metabolic syndrome 09/03/2021    IFG, truncal obesity, low HDL, HTN, and fatty liver meets criteria for metabolic syndrome    Overweight     Peptic ulcer disease 11/17/2017    Prehypertension     Rosacea 06/02/2016    SBO (small bowel obstruction) 02/15/2024    Squamous cell carcinoma of skin     2012 sees derm q 6 months (Dr. Mitchell)    Upper back pain, chronic 07/23/2019    Ventral hernia without obstruction or gangrene 06/22/2017    Tiny, on CT 6/2017    Vitamin D deficiency         Social History:    Social History     Socioeconomic History    Marital status:      Spouse name: Shante    Number of children: 2    Years of education: College   Tobacco Use    Smoking status: Never    Smokeless tobacco: Never   Vaping Use    Vaping status: Never Used   Substance and Sexual Activity     Alcohol use: Yes     Alcohol/week: 5.0 standard drinks of alcohol     Types: 5 Cans of beer per week     Comment: 2-5 drinks/ week    Drug use: No    Sexual activity: Yes     Partners: Female     Birth control/protection: None     Comment: wife only; no hx STD's       Family History:   Family History   Problem Relation Age of Onset    Hypothyroidism Mother     Rheum arthritis Maternal Aunt     Breast cancer Maternal Aunt     Breast cancer Maternal Grandmother     Cancer Maternal Grandmother         Breast / Bone    Glaucoma Paternal Grandmother     Migraines Daughter     Seizures Daughter         Epilepsy    Migraines Son     Cancer Maternal Aunt     Colon cancer Neg Hx     Colon polyps Neg Hx     Malig Hyperthermia Neg Hx     Crohn's disease Neg Hx     Irritable bowel syndrome Neg Hx     Ulcerative colitis Neg Hx        Surgical History:   Past Surgical History:   Procedure Laterality Date    CHOLECYSTECTOMY  2013    COLLATERAL LIGAMENT REPAIR, ELBOW  02/03/2025    COLONOSCOPY      ENDOSCOPY  2018    ENDOSCOPY N/A 10/29/2021    Procedure: ESOPHAGOGASTRODUODENOSCOPY WITH BIOPSY;  Surgeon: Steven Meehan MD;  [place] [facility];  Service: Gastroenterology;  Laterality: N/A;    ENDOSCOPY N/A 10/21/2024    Procedure: ESOPHAGOGASTRODUODENOSCOPY AND BIOPSIES;  Surgeon: Steven Meehan MD;  [place] [facility];  Service: Gastroenterology;  Laterality: N/A;  gastric polyps, inlet patch    ENDOSCOPY AND COLONOSCOPY N/A 2017    LYSIS OF ABDOMINAL ADHESIONS  07/10/2014    after SBO    SINUS SURGERY  2007    persistent congestion ?? turbinate reduction    TONSILLECTOMY      1978    UPPER GASTROINTESTINAL ENDOSCOPY           Current Outpatient Medications:     cholecalciferol (VITAMIN D3) 1000 units tablet, Take 1 tablet by mouth Daily., Disp: 30 tablet, Rfl: 5    metroNIDAZOLE (Metrogel) 1 % gel, Apply  topically to the appropriate area as directed Daily., Disp: 60 g, Rfl: 2    omeprazole (priLOSEC) 40 MG  capsule, TAKE 1 CAPSULE BY MOUTH DAILY, Disp: 90 capsule, Rfl: 3    pitavastatin calcium (LIVALO) 2 MG tablet tablet, Take 1 tablet by mouth Every Night., Disp: 90 tablet, Rfl: 0    psyllium (METAMUCIL) 58.6 % packet, Take 1 packet by mouth Daily., Disp: , Rfl:     telmisartan (MICARDIS) 80 MG tablet, TAKE 1 TABLET BY MOUTH DAILY (Patient taking differently: Take 0.5 tablets by mouth Daily.), Disp: 90 tablet, Rfl: 2    polyethylene glycol (MIRALAX) 17 GM/SCOOP powder, Take 17 g by mouth Daily As Needed (Use if senna-docusate is ineffective). (Patient not taking: Reported on 5/8/2025), Disp: 510 g, Rfl: 0    Vitals:    05/08/25 1125   BP: 124/72   Pulse: 85   Temp: 98.2 °F (36.8 °C)   SpO2: 98%     Body mass index is 32.73 kg/m².    Physical Exam  Vitals reviewed.   Constitutional:       General: He is not in acute distress.     Appearance: Normal appearance. He is well-developed. He is obese. He is not ill-appearing or toxic-appearing.   HENT:      Head: Normocephalic and atraumatic.      Right Ear: Hearing, tympanic membrane, ear canal and external ear normal. There is no impacted cerumen.      Left Ear: Hearing, tympanic membrane, ear canal and external ear normal. There is no impacted cerumen.      Nose: Nose normal.      Mouth/Throat:      Mouth: Mucous membranes are moist. No oral lesions.      Tongue: No lesions.      Pharynx: Oropharynx is clear. Uvula midline. No pharyngeal swelling, oropharyngeal exudate, posterior oropharyngeal erythema or uvula swelling.   Eyes:      General: Lids are normal. No scleral icterus.        Right eye: No discharge.         Left eye: No discharge.      Extraocular Movements: Extraocular movements intact.      Conjunctiva/sclera: Conjunctivae normal.      Pupils: Pupils are equal, round, and reactive to light.   Neck:      Thyroid: No thyroid mass or thyromegaly.      Vascular: No carotid bruit.   Cardiovascular:      Rate and Rhythm: Normal rate and regular rhythm.      Pulses:            Radial pulses are 2+ on the right side and 2+ on the left side.        Dorsalis pedis pulses are 2+ on the right side and 2+ on the left side.        Posterior tibial pulses are 2+ on the right side and 2+ on the left side.      Heart sounds: Normal heart sounds, S1 normal and S2 normal. No murmur heard.     No friction rub. No gallop.   Pulmonary:      Effort: Pulmonary effort is normal. No respiratory distress.      Breath sounds: Normal breath sounds. No wheezing, rhonchi or rales.   Abdominal:      General: Bowel sounds are normal. There is no distension.      Palpations: Abdomen is soft. There is no mass.      Tenderness: There is no abdominal tenderness. There is no guarding or rebound.      Comments: Truncal adiposity note   Genitourinary:     Prostate: Normal. Not enlarged, not tender and no nodules present.      Rectum: Normal. No external hemorrhoid. Normal anal tone.   Musculoskeletal:         General: No deformity. Normal range of motion.      Right shoulder: No tenderness, bony tenderness or crepitus. Normal range of motion.      Left shoulder: No tenderness, bony tenderness or crepitus. Normal range of motion.      Right hand: No tenderness or bony tenderness. Normal range of motion. Normal strength.      Left hand: No tenderness or bony tenderness. Normal range of motion. Normal strength.      Cervical back: Full passive range of motion without pain, normal range of motion and neck supple.      Right lower leg: No edema.      Left lower leg: No edema.   Lymphadenopathy:      Cervical: No cervical adenopathy.      Right cervical: No superficial, deep or posterior cervical adenopathy.     Left cervical: No superficial, deep or posterior cervical adenopathy.      Upper Body:      Right upper body: No supraclavicular, axillary or pectoral adenopathy.      Left upper body: No supraclavicular, axillary or pectoral adenopathy.   Skin:     General: Skin is warm and dry.      Findings: No rash.              Comments: Small biopsy site mid back just to right of midline, healing well by secondary intention   Neurological:      Mental Status: He is alert and oriented to person, place, and time.      Cranial Nerves: No cranial nerve deficit, dysarthria or facial asymmetry.      Sensory: No sensory deficit.      Motor: No weakness, tremor, atrophy or abnormal muscle tone.      Gait: Gait normal.      Deep Tendon Reflexes: Reflexes are normal and symmetric.      Reflex Scores:       Patellar reflexes are 2+ on the right side and 2+ on the left side.       Achilles reflexes are 2+ on the right side and 2+ on the left side.  Psychiatric:         Attention and Perception: Attention normal.         Mood and Affect: Mood normal.         Speech: Speech normal.         Behavior: Behavior normal. Behavior is cooperative.         Thought Content: Thought content normal.         Assessment/ Plan  Diagnoses and all orders for this visit:    Encounter for general adult medical examination with abnormal findings    Denis's esophagus with dysplasia    Avitaminosis D    Dyslipidemia    Essential hypertension    Fatty liver    Gastroesophageal reflux disease without esophagitis    Hereditary hemochromatosis    History of small bowel obstruction 2013; 2015; 2016, 2023    Abdominal adhesions    Metabolic syndrome    IFG (impaired fasting glucose)    Obesity (BMI 30-39.9)    Rosacea    Squamous cell carcinoma of skin    Need for vaccination against Streptococcus pneumoniae  -     Pneumococcal Conjugate Vaccine 20-Valent All    Other orders  -     psyllium (METAMUCIL) 58.6 % packet; Take 1 packet by mouth Daily.        Return in about 6 months (around 11/8/2025) for Recheck.      Discussion:  Matias Hughes is a 54 y.o. male RTC in yearly CPE, review of medical issues: has been doing well.   1. Hx of SBO related to adhesions from gallbladder surgery adhesions; 2013; 2015; 2016 with recent inpt stay at [place] for recurrent SBO  4/13-4/16/21 - last inpatient stay 2/15-2/17/24 for progressive SBO symptoms and x-ray evidence of SBO at GI appointment, small bowel follow-through at recent inpatient evaluation demonstrated contrast passing into the colon. Recall, patient with history of recurrent SBO in the past, suspected adhesions.  ROBERT over last year, managing bowels well with daily Metamucil/ fiber.   2. HTN, new dx in 2019 - controlled telmisartan 80mg daily --> 40mg after postop hypotension events in 2024. BMP OK.  3. IFG/ Obesity,  Dx metabolic syndrome - overall stable, A1C remains normal with discordant elevated FBS.  TLC diet mods and aggressive exercise addition advised for goal of weight loss. Trend.   4. Dyslipidemia, low HDL; Metabolic Syndrome and past high average hsCRP; did not tolerate Atorvastatin due muscle aching and myalgias -LDL at goal on pitavastatin.  Reinitiate exercise with low HDL.    5. Hemachromatosis - phlebotomy ~ 3-4x/ year in past with goal ferritin < 50 with Heme.  However, some concern of ferritin elevation being primarily due to fatty liver with no metabolic syndrome given patient with heterozygous gene analysis.  Transition to ferritin goal to < 100 and no need for phlebotomy in last ~18 months.  F/U hematology as planned   6. Vitamin D deficiency -persists on 1000 I.U. Vitamin D3 OTC.  Increase to 2000 I.U. daily.  Trend    7. Reflux esophagitis/ Denis's Esophagus repeat  EGD 11/2018 --> 10/2021 no dysplasia -EGD 10/2024 with IM without dysplasia.  C/W 40 mg Omeprazole daily.   Repeat 3 years EGD.  Counseled.  8. Squamous Cell CA of skin ~2014 - sees derm q 6 months, s/p BX on mid back and cryotherapy at last appointment.    9. Rosacea, off minocycline for years - Rare recurrence. MetroGel PRN flares.  10. EpiDermal inclusion cyst left neck -new mention today, exam compatible.  Reassured patient.  Monitor for any secondary infection  11. HM - labs d/w pt; Flu/ Tdap/ Hep A/ Shingrix/ COVID - UTD; Prevnar  20 today; C-scope UTD 2011 --> 10/2017 --> 10 years; NAGI/ PSA OK; Hep C Ab (-) 8/2020; add more exercise for goal of waist size/pant size reduction    RTC 6 months F labs prior

## 2025-05-08 NOTE — Clinical Note
Controlled Substance Prescribing Agreement          I, Matias Hughes [PATIENT],  1970 [] a patient of  Bernard Souza MD   [PROVIDER] at [facility] [PRACTICE], have been informed that  individuals who are prescribed certain Controlled Substances including, but not limited to, narcotic pain medicines, stimulants, benzodiazepine tranquilizers, and barbiturate sedatives, can abuse those substances or may allow abuse by others, and have some risk of developing an addictive disorder or suffering a relapse of a prior addiction. Therefore, I have been informed that it is necessary to observe strict rules pertaining to their use, and I agree to follow the terms and procedures described in this Agreement as consideration for, and as a condition of, the willingness of the physician whose signature appears below to consider prescribing or to continue prescribing Controlled Substances to treat my pain.     1. I will inform my physician of any current or past substance abuse, or any current or past substance abuse of any immediate member of my immediate family.     2. I agree that I may be subject to a voluntary evaluation by psychologists and/or psychiatrists, possibly at my own expense, before any Controlled Substances will be prescribed to me. I agree that the need to be evaluated by psychologists and/or psychiatrists may be revisited every three (3) to six (6) months thereafter while taking the medication.     3. All Controlled Substances must come from a provider in the PROVIDER’S PRACTICE. My Controlled Substances will come from the PROVIDER whose signature appears below, or during his or her absence, by the covering provider, unless specific written authorization is obtained from the office for an exception.     4. I will obtain all Controlled Substances from the same pharmacy. Should the need arise to change pharmacies, I will inform the PROVIDER’S office.     5. I will inform  the PROVIDER’S office of any new medications or medical conditions, and of any adverse effects I experience from any of the medications that I take.     6. I will inform my other health care providers that I am taking the Controlled Substances listed above, and of the existence of this Agreement. In the event of an emergency, I will provide the foregoing information to emergency department providers.     7. I agree that my prescribing PROVIDER has permission to discuss all diagnostic and treatment details with other health care providers, pharmacists, or other professionals who provide my health care regarding my use of Controlled Substances for purposes of maintaining accountability.     8. I will not allow anyone else to have, use sell, or otherwise have access to these medications. The sharing of medications with anyone is absolutely forbidden and is against the law.         9. I understand that Controlled Substances may be hazardous or lethal to a person who is not tolerant to their effects, especially a child, and that I must keep them out of reach of such people for their own safety.     10. I understand that tampering with a written prescription is a felony and I will not change or tamper with the PROVIDER’S written prescription.     11. I am aware that attempting to obtain a Controlled Substance under false pretenses is illegal.     12. I agree not to alter my medication in any way, and I will take my medication whole, and it will not be broken, chewed, crushed, injected, or snorted.     13. I will take my medication as instructed and prescribed, and I will not exceed the maximum prescribed dose. Any change in dosage must be approved by the PROVIDER or a physician within the PRACTICE.     14. I understand that these drugs should not be stopped abruptly, as withdrawal syndromes may develop.     15. I will cooperate with unannounced urine or serum toxicology screenings as may be requested, as well as any  random pill counts of medication by the PROVIDER. Failure to comply may result in termination of the PROVIDER-patient relationship.     16. I understand that the presence of unauthorized and/or illegal substances in the screenings described in the paragraph above may prompt referral for assessment for a substance abuse disorder or termination of the PROVIDER-patient relationship.     17. I understand that medications may not be replaced if they are lost, damaged, or stolen. If any of these situations arise that cause me to request an early refill of my medication, a copy of a filed police report or a statement from me explaining the circumstances may be required before additional prescriptions are considered. If I request an early refill secondary to lost, damaged, or stolen prescriptions twice within a year, I may be discharged from the practice.     18. I understand that a prescription may be given early if the PROVIDER or the patient will be out of town when the refill is due. These prescriptions will contain instructions to the pharmacist that the prescriptions(s) may not be filled prior to the appropriate date.     19. If the responsible legal authorities have questions concerning my treatment, as may occur, for example, if I obtained medication at several pharmacies, all confidentiality is waived, and these authorities may be given full access to my full records of Controlled Substances administration.     20. I will keep my scheduled appointments in order to receive medication renewals. If I need to cancel my appointment, I will do so a minimum of twenty-four (24) hours before it is scheduled.     21. I understand that I may be asked to bring my medications in their original container to the PROVIDER’s office while I am on controlled medication.     22. Refills generally will not be given over the phone, after office hours, during the weekends, and on holidays.     23. I understand that any medical treatment  is initially a trial, with the goal of treatment being to improve the quality of life and ability to function and/or work. These parameters will be assessed periodically to determine the benefits of continued therapy, and continued prescription is contingent on whether my physician believes that the medication usage benefits me. I will comply with all treatments as outlined by the PROVIDER.     24. I have been explained the risks and potential benefits of these therapies, including, but not limited to, psychological addiction, physical dependence, withdrawal and over dosage.     25. I understand that failure to adhere to these policies and/or failure to comply with the PROVIDER’S treatment plan may result in cessation of therapy with Controlled Substance prescribing by the PROVIDER or referral for further specialty assessment, as well as possible discharge from the PRACTICE.     26. I, the undersigned patient, attest that the foregoing was discussed with me, and that I have read, fully understand, and agree to all of the above requirements and instructions. I affirm that I have the full right and power to sign and be bound by this  Agreement.         Date:  __________________________________________    Time:  __________________________________________    Patient Printed Name:  _____________________________    Patient Signature:  ________________________________           Date:  __________________________________________    Time:  __________________________________________    Provider Signature:  _______________________________

## 2025-05-08 NOTE — LETTER
[facility]  Vaccine Consent Form    Patient Name:  Matias Hughes  Patient :  1970     Vaccine(s) Ordered    Pneumococcal Conjugate Vaccine 20-Valent All        Screening Checklist  The following questions should be completed prior to vaccination. If you answer “yes” to any question, it does not necessarily mean you should not be vaccinated. It just means we may need to clarify or ask more questions. If a question is unclear, please ask your healthcare provider to explain it.    Yes No   Any fever or moderate to severe illness today (mild illness and/or antibiotic treatment are not contraindications)?     Do you have a history of a serious reaction to any previous vaccinations, such as anaphylaxis, encephalopathy within 7 days, Guillain-Windsor syndrome within 6 weeks, seizure?     Have you received any live vaccine(s) (e.g MMR, ERICKSON) or any other vaccines in the last month (to ensure duplicate doses aren't given)?     Do you have an anaphylactic allergy to latex (DTaP, DTaP-IPV, Hep A, Hep B, MenB, RV, Td, Tdap), baker’s yeast (Hep B, HPV), polysorbates (RSV, nirsevimab, PCV 20, Rotavirrus, Tdap, Shingrix), or gelatin (ERICKSON, MMR)?     Do you have an anaphylactic allergy to neomycin (Rabies, ERICKSON, MMR, IPV, Hep A), polymyxin B (IPV), or streptomycin (IPV)?      Any cancer, leukemia, AIDS, or other immune system disorder? (ERICKSON, MMR, RV)     Do you have a parent, brother, or sister with an immune system problem (if immune competence of vaccine recipient clinically verified, can proceed)? (MMR, ERICKSON)     Any recent steroid treatments for >2 weeks, chemotherapy, or radiation treatment? (ERICKSON, MMR)     Have you received antibody-containing blood transfusions or IVIG in the past 11 months (recommended interval is dependent on product)? (MMR, ERICKSON)     Have you taken antiviral drugs (acyclovir, famciclovir, valacyclovir for ERICKSON) in the last 24 or 48 hours, respectively?      Are you pregnant or planning to become  "pregnant within 1 month? (ERICKSON, MMR, HPV, IPV, MenB, Abrexvy; For Hep B- refer to Engerix-B; For RSV - Abrysvo is indicated for 32-36 weeks of pregnancy from September to January)     For infants, have you ever been told your child has had intussusception or a medical emergency involving obstruction of the intestine (Rotavirus)? If not for an infant, can skip this question.         *Ordering Physicians/APC should be consulted if \"yes\" is checked by the patient or guardian above.  I have received, read, and understand the Vaccine Information Statement (VIS) for each vaccine ordered.  I have considered my or my child's health status as well as the health status of my close contacts.  I have taken the opportunity to discuss my vaccine questions with my or my child's health care provider.   I have requested that the ordered vaccine(s) be given to me or my child.  I understand the benefits and risks of the vaccines.  I understand that I should remain in the clinic for 15 minutes after receiving the vaccine(s).  _________________________________________________________  Signature of Patient or Parent/Legal Guardian ____________________  Date   "

## 2025-06-03 RX ORDER — PITAVASTATIN CALCIUM 2.09 MG/1
2 TABLET, FILM COATED ORAL NIGHTLY
Qty: 90 TABLET | Refills: 0 | Status: SHIPPED | OUTPATIENT
Start: 2025-06-03

## 2025-07-02 ENCOUNTER — TELEPHONE (OUTPATIENT)
Dept: GASTROENTEROLOGY | Facility: CLINIC | Age: 55
End: 2025-07-02
Payer: COMMERCIAL

## 2025-07-02 NOTE — TELEPHONE ENCOUNTER
Hub staff attempted to follow warm transfer process and was unsuccessful     Caller: Shante Thacker    Relationship to patient: Emergency Contact    Best call back number: [phone]    Patient is needing: PT'S WIFE IS CALLING FOR A DIRECT ADMIT FOR VOMITING FOR DAYS AND HAVING THE SAME SYMPTOMS WHEN HE AS OBSTRUCTIONS. PLEASE CALL AND ADVISE.

## 2025-07-02 NOTE — TELEPHONE ENCOUNTER
Spoke with RG regarding below message.  She said to have patient and his wife proceed to the ER for his medical issue.

## (undated) DEVICE — SINGLE-USE BIOPSY FORCEPS: Brand: RADIAL JAW 4

## (undated) DEVICE — Device

## (undated) DEVICE — VIAL FORMLN CAP 10PCT 20ML

## (undated) DEVICE — BITEBLOCK OMNI BLOC

## (undated) DEVICE — GOWN PROC ENDOARMOR GI LVL3 HY/SHLD UNIV

## (undated) DEVICE — BLCK/BITE BLOX W/DENTL/RIM W/STRAP 54F

## (undated) DEVICE — MSK ENDO PORT O2 POM ELITE CURAPLEX A/

## (undated) DEVICE — KT ORCA ORCAPOD DISP STRL

## (undated) DEVICE — GOWN ISOL W/THUMB UNIV BLU BX/15

## (undated) DEVICE — FLEX ADVANTAGE 1500CC: Brand: FLEX ADVANTAGE

## (undated) DEVICE — CANN NASL CO2 TRULINK W/O2 A/

## (undated) DEVICE — ADAPT CLN SCPE ENDO PORPOISE BX/50 DISP